# Patient Record
Sex: FEMALE | Race: WHITE | NOT HISPANIC OR LATINO | Employment: FULL TIME | URBAN - METROPOLITAN AREA
[De-identification: names, ages, dates, MRNs, and addresses within clinical notes are randomized per-mention and may not be internally consistent; named-entity substitution may affect disease eponyms.]

---

## 2017-02-03 ENCOUNTER — TELEPHONE (OUTPATIENT)
Dept: OBSTETRICS AND GYNECOLOGY | Facility: CLINIC | Age: 22
End: 2017-02-03

## 2017-02-13 ENCOUNTER — POSTPARTUM VISIT (OUTPATIENT)
Dept: OBSTETRICS AND GYNECOLOGY | Facility: CLINIC | Age: 22
End: 2017-02-13

## 2017-02-13 VITALS
HEIGHT: 62 IN | SYSTOLIC BLOOD PRESSURE: 110 MMHG | WEIGHT: 140 LBS | BODY MASS INDEX: 25.76 KG/M2 | DIASTOLIC BLOOD PRESSURE: 70 MMHG

## 2017-02-13 DIAGNOSIS — Z30.09 ENCOUNTER FOR OTHER GENERAL COUNSELING OR ADVICE ON CONTRACEPTION: ICD-10-CM

## 2017-02-13 PROCEDURE — 99213 OFFICE O/P EST LOW 20 MIN: CPT | Performed by: OBSTETRICS & GYNECOLOGY

## 2017-02-13 NOTE — PROGRESS NOTES
Subjective   Fe Smith is a 21 y.o. female here for postpartum exam    History of Present Illness    21-year-old white female  1 para 1 status post spontaneous vaginal delivery of a viable male infant 7 lbs. 3 oz. with Apgars of 9 and 9.  He is doing well.  She is bottle feeding.  She started her menstrual cycle yesterday.  She desires Nexplanon for contraception.    The following portions of the patient's history were reviewed and updated as appropriate: allergies, current medications, past family history, past medical history, past social history, past surgical history and problem list.    Review of Systems   Gastrointestinal: Negative for abdominal distention and abdominal pain.   Genitourinary: Positive for vaginal bleeding. Negative for difficulty urinating and pelvic pain.       Objective   Physical Exam   Constitutional: She is oriented to person, place, and time. She appears well-developed and well-nourished. No distress.   HENT:   Head: Normocephalic.   Neck: Neck supple. No thyromegaly present.   Cardiovascular: Normal rate, regular rhythm and normal heart sounds.  Exam reveals no gallop.    No murmur heard.  Pulmonary/Chest: Effort normal and breath sounds normal.   Abdominal: Soft. Bowel sounds are normal. She exhibits no distension. There is no tenderness.   Genitourinary:   Genitourinary Comments: The vulva and perineum are without lesion.  The midline episiotomy is well-healed.  Vagina has a small amount of menstrual blood in the vault.  The cervix is without lesion or tenderness to motion.  Uterus is anteverted and normal size and shape.  The adnexa are without mass or tenderness.  The rectovaginal septum is intact.   Musculoskeletal: Normal range of motion. She exhibits no edema or tenderness.   Neurological: She is alert and oriented to person, place, and time. She has normal reflexes.   Skin: Skin is warm and dry. She is not diaphoretic.   Psychiatric: She has a normal mood and affect.  Her behavior is normal.       Assessment/Plan   Fe was seen today for postpartum follow-up.    Diagnoses and all orders for this visit:    Postpartum state    Encounter for other general counseling or advice on contraception     We discussed contraceptive options and the patient desires the progesterone implant.  We will order a Nexplanon and she will return to the office later this week for insertion.  An information booklet was given.

## 2017-02-17 ENCOUNTER — OFFICE VISIT (OUTPATIENT)
Dept: OBSTETRICS AND GYNECOLOGY | Facility: CLINIC | Age: 22
End: 2017-02-17

## 2017-02-17 VITALS
BODY MASS INDEX: 25.76 KG/M2 | DIASTOLIC BLOOD PRESSURE: 70 MMHG | HEIGHT: 62 IN | WEIGHT: 140 LBS | SYSTOLIC BLOOD PRESSURE: 110 MMHG

## 2017-02-17 DIAGNOSIS — Z30.017 ENCOUNTER FOR INITIAL PRESCRIPTION OF IMPLANTABLE SUBDERMAL CONTRACEPTIVE: Primary | ICD-10-CM

## 2017-02-17 PROCEDURE — 11981 INSERTION DRUG DLVR IMPLANT: CPT | Performed by: OBSTETRICS & GYNECOLOGY

## 2017-02-17 NOTE — PROGRESS NOTES
Subjective   Fe Smith is a 21 y.o. female here for Nexplanon insertion    History of Present Illness    21-year-old white female  1 para 1 presents for Nexplanon insertion.  She is currently on her menstrual cycle.  She has read the information booklet and has given both oral and written consent to insertion of the progesterone insert.  She has no complaints.    The following portions of the patient's history were reviewed and updated as appropriate: allergies, current medications, past family history, past medical history, past social history, past surgical history and problem list.    Review of Systems   Genitourinary: Positive for vaginal bleeding. Negative for menstrual problem.       Objective   Physical Exam   The left upper arm was prepped with alcohol.  1% Xylocaine local anesthetic that was administered to the inner portion of the left upper arm.  The Nexplanon implant was then inserted without difficulty.  Hemostasis was achieved with direct pressure.  The needle site was bandaged with a Band-Aid.  The patient tolerated the insertion well.    Assessment/Plan   Fe was seen today for follow-up.    Diagnoses and all orders for this visit:    Encounter for initial prescription of implantable subdermal contraceptive  -     Etonogestrel (NEXPLANON) 68 MG subdermal implant; Inject  into the skin 1 (One) Time.       Instructions were given regarding activities today.  She will call with any problems.

## 2018-05-14 ENCOUNTER — OFFICE VISIT (OUTPATIENT)
Dept: OBSTETRICS AND GYNECOLOGY | Facility: CLINIC | Age: 23
End: 2018-05-14

## 2018-05-14 VITALS
WEIGHT: 113 LBS | BODY MASS INDEX: 20.8 KG/M2 | HEIGHT: 62 IN | SYSTOLIC BLOOD PRESSURE: 100 MMHG | DIASTOLIC BLOOD PRESSURE: 60 MMHG

## 2018-05-14 DIAGNOSIS — Z30.46 ENCOUNTER FOR SURVEILLANCE OF IMPLANTABLE SUBDERMAL CONTRACEPTIVE: ICD-10-CM

## 2018-05-14 DIAGNOSIS — N92.1 BREAKTHROUGH BLEEDING ON NEXPLANON: ICD-10-CM

## 2018-05-14 DIAGNOSIS — Z01.419 ENCOUNTER FOR GYNECOLOGICAL EXAMINATION WITHOUT ABNORMAL FINDING: Primary | ICD-10-CM

## 2018-05-14 DIAGNOSIS — Z97.5 BREAKTHROUGH BLEEDING ON NEXPLANON: ICD-10-CM

## 2018-05-14 PROCEDURE — 99395 PREV VISIT EST AGE 18-39: CPT | Performed by: OBSTETRICS & GYNECOLOGY

## 2018-05-14 NOTE — PROGRESS NOTES
Joey Smith is a 23 y.o. female for annual gyn exam    History of Present Illness   23-year-old white female  1 para 1 presents for routine gynecologic exam.  She has been using the Nexplanon for contraception for the last year.  She has had issues with irregular bleeding.  She is otherwise doing well.  There is no family history of breast cancer.  She is considering alternative forms of contraception.        The following portions of the patient's history were reviewed and updated as appropriate: allergies, current medications, past family history, past medical history, past social history, past surgical history and problem list.      Review of Systems   Constitutional: Negative for activity change, appetite change, fatigue and unexpected weight change.   HENT: Negative.    Eyes: Negative.    Respiratory: Negative.    Cardiovascular: Negative.    Gastrointestinal: Negative for abdominal distention, abdominal pain, anal bleeding, constipation, diarrhea, nausea and vomiting.   Endocrine: Negative for cold intolerance, heat intolerance, polydipsia, polyphagia and polyuria.   Genitourinary: Positive for menstrual problem. Negative for difficulty urinating, dysuria, flank pain, frequency, hematuria, pelvic pain, urgency, vaginal bleeding and vaginal discharge.   Musculoskeletal: Negative.    Skin: Negative.    Allergic/Immunologic: Negative.    Neurological: Negative.    Hematological: Negative.    Psychiatric/Behavioral: Negative.            Physical Exam   Constitutional: She is oriented to person, place, and time. Vital signs are normal. She appears well-developed and well-nourished. She is cooperative.   HENT:   Head: Normocephalic.   Eyes: Conjunctivae are normal. Pupils are equal, round, and reactive to light.   Neck: Normal range of motion. Neck supple. No tracheal deviation present. No thyromegaly present.   Cardiovascular: Normal rate, regular rhythm and normal heart sounds.  Exam reveals  no gallop and no friction rub.    No murmur heard.  Pulmonary/Chest: Effort normal and breath sounds normal. No respiratory distress. She has no wheezes. Right breast exhibits no inverted nipple, no mass, no nipple discharge, no skin change and no tenderness. Left breast exhibits no inverted nipple, no mass, no nipple discharge, no skin change and no tenderness. Breasts are symmetrical. There is no breast swelling.   Abdominal: Soft. Bowel sounds are normal. She exhibits no distension, no ascites and no mass. There is no hepatosplenomegaly. There is no tenderness. There is no rebound, no guarding and no CVA tenderness. No hernia. Hernia confirmed negative in the right inguinal area and confirmed negative in the left inguinal area.   Genitourinary: Rectal exam shows no external hemorrhoid. Pelvic exam was performed with patient supine. No labial fusion. There is no rash, tenderness, lesion or injury on the right labia. There is no rash, tenderness, lesion or injury on the left labia. Uterus is not deviated, not enlarged, not fixed and not tender. Cervix exhibits no motion tenderness, no discharge and no friability. Right adnexum displays no mass, no tenderness and no fullness. Left adnexum displays no mass, no tenderness and no fullness. No erythema, tenderness or bleeding in the vagina. No foreign body in the vagina. No signs of injury around the vagina. No vaginal discharge found.   Genitourinary Comments: The uterus is anteverted and mobile and normal size and shape.   Musculoskeletal: Normal range of motion. She exhibits no edema or deformity.   Lymphadenopathy:     She has no cervical adenopathy.        Right: No inguinal adenopathy present.        Left: No inguinal adenopathy present.   Neurological: She is alert and oriented to person, place, and time. She has normal reflexes. No cranial nerve deficit. Coordination normal.   Skin: Skin is warm and dry. No rash noted. No erythema.   The implant is palpable and  intact in the left upper arm on the medial aspect.   Psychiatric: She has a normal mood and affect. Her behavior is normal.         Fe was seen today for gynecologic exam.    Diagnoses and all orders for this visit:    Encounter for gynecological examination without abnormal finding  -     Pap IG, Ct-Ng, Rfx HPV ASCU    Encounter for surveillance of implantable subdermal contraceptive    Breakthrough bleeding on Nexplanon     we discussed her bleeding issues.  Her pelvic exam is reassuring.  We discussed alternative options including the IUD.  The patient would like to observe her bleeding for a while longer prior to making a change.  I encouraged a healthy diet and regular exercise.  Annual exams were recommended.

## 2018-05-18 LAB
C TRACH RRNA CVX QL NAA+PROBE: NEGATIVE
CONV .: ABNORMAL
CYTOLOGIST CVX/VAG CYTO: ABNORMAL
CYTOLOGY CVX/VAG DOC THIN PREP: ABNORMAL
DX ICD CODE: ABNORMAL
DX ICD CODE: ABNORMAL
HIV 1 & 2 AB SER-IMP: ABNORMAL
HPV I/H RISK 1 DNA CVX QL PROBE+SIG AMP: NEGATIVE
N GONORRHOEA RRNA CVX QL NAA+PROBE: NEGATIVE
OTHER STN SPEC: ABNORMAL
PATH REPORT.FINAL DX SPEC: ABNORMAL
PATHOLOGIST CVX/VAG CYTO: ABNORMAL
RECOM F/U CVX/VAG CYTO: ABNORMAL
STAT OF ADQ CVX/VAG CYTO-IMP: ABNORMAL

## 2018-06-11 ENCOUNTER — OFFICE VISIT (OUTPATIENT)
Dept: OBSTETRICS AND GYNECOLOGY | Facility: CLINIC | Age: 23
End: 2018-06-11

## 2018-06-11 VITALS — HEIGHT: 62 IN | BODY MASS INDEX: 20.8 KG/M2 | WEIGHT: 113 LBS

## 2018-06-11 DIAGNOSIS — Z30.46 ENCOUNTER FOR SURVEILLANCE OF IMPLANTABLE SUBDERMAL CONTRACEPTIVE: Primary | ICD-10-CM

## 2018-06-11 PROCEDURE — 11982 REMOVE DRUG IMPLANT DEVICE: CPT | Performed by: OBSTETRICS & GYNECOLOGY

## 2018-06-11 NOTE — PROGRESS NOTES
Subjective   Fe Smith is a 23 y.o. female for Nexplanon removal     History of Present Illness    23-year-old white female  1 para 1 presents for Nexplanon removal.  She has had issues with irregular, heavy vaginal bleeding with the Nexplanon.  She was seen for her annual exam last month and has decided to change to a Mirena IUD.  She has read the information booklet.    The following portions of the patient's history were reviewed and updated as appropriate: allergies, current medications, past family history, past medical history, past social history, past surgical history and problem list.    Review of Systems   Cardiovascular: Negative for leg swelling.   Gastrointestinal: Negative for abdominal pain.   Genitourinary: Positive for menstrual problem, pelvic pain and vaginal bleeding.       Objective   Physical Exam   In the supine position, the left upper arm was isolated and the implant identified.  The skin was cleaned with alcohol and 1% Xylocaine local anesthetic was injected at the tip of the implant.  A 7 mm incision was then made and the implant removed in its entirety without difficulty.  The implant was intact.  The incision was then reapproximated with a Steri-Strip.  Hemostasis was complete.  The wound was bandaged with a Band-Aid.  The patient tolerated the removal well.  She will follow-up post-removal instructions which were explicit.    Assessment/Plan   Fe was seen today for gynecologic exam.    Diagnoses and all orders for this visit:    Encounter for surveillance of implantable subdermal contraceptive     The patient will schedule IUD placement at her convenience.  She will use barrier contraception in the meantime.

## 2019-09-11 ENCOUNTER — INITIAL PRENATAL (OUTPATIENT)
Dept: OBSTETRICS AND GYNECOLOGY | Facility: CLINIC | Age: 24
End: 2019-09-11

## 2019-09-11 ENCOUNTER — PROCEDURE VISIT (OUTPATIENT)
Dept: OBSTETRICS AND GYNECOLOGY | Facility: CLINIC | Age: 24
End: 2019-09-11

## 2019-09-11 VITALS — SYSTOLIC BLOOD PRESSURE: 112 MMHG | DIASTOLIC BLOOD PRESSURE: 59 MMHG | WEIGHT: 121 LBS | BODY MASS INDEX: 22.13 KG/M2

## 2019-09-11 DIAGNOSIS — O21.9 NAUSEA AND VOMITING IN PREGNANCY: ICD-10-CM

## 2019-09-11 DIAGNOSIS — Z34.81 PRENATAL CARE, SUBSEQUENT PREGNANCY IN FIRST TRIMESTER: Primary | ICD-10-CM

## 2019-09-11 DIAGNOSIS — O36.80X0 ENCOUNTER TO DETERMINE FETAL VIABILITY OF PREGNANCY, SINGLE OR UNSPECIFIED FETUS: Primary | ICD-10-CM

## 2019-09-11 DIAGNOSIS — K59.09 OTHER CONSTIPATION: ICD-10-CM

## 2019-09-11 PROCEDURE — 99214 OFFICE O/P EST MOD 30 MIN: CPT | Performed by: OBSTETRICS & GYNECOLOGY

## 2019-09-11 PROCEDURE — 76817 TRANSVAGINAL US OBSTETRIC: CPT | Performed by: OBSTETRICS & GYNECOLOGY

## 2019-09-11 RX ORDER — PRENATAL VIT NO.126/IRON/FOLIC 28MG-0.8MG
1 TABLET ORAL DAILY
Qty: 30 TABLET | Refills: 11 | Status: SHIPPED | OUTPATIENT
Start: 2019-09-11 | End: 2020-05-22

## 2019-09-11 RX ORDER — PROMETHAZINE HYDROCHLORIDE 12.5 MG/1
12.5 TABLET ORAL EVERY 6 HOURS PRN
Qty: 30 TABLET | Refills: 0 | Status: SHIPPED | OUTPATIENT
Start: 2019-09-11 | End: 2019-10-13 | Stop reason: SDUPTHER

## 2019-09-11 RX ORDER — DOCUSATE SODIUM 100 MG/1
100 CAPSULE, LIQUID FILLED ORAL 2 TIMES DAILY
Qty: 60 CAPSULE | Refills: 5 | Status: ON HOLD | OUTPATIENT
Start: 2019-09-11 | End: 2020-03-01

## 2019-09-11 NOTE — PROGRESS NOTES
IOB   24-year-old white female  2 para 1 S menstrual period July 15 presents for initial obstetric evaluation.  Her menstrual cycles had been fairly regular and she was not using any form of contraception.  She has no chronic medical problems except migraine headaches.  Her first pregnancy was complicated by polyhydramnios and she underwent a uncomplicated vaginal delivery at term.  There is no family history of genetic disorders.  She has yet to begin prenatal vitamins and has stopped her migraine headache medication.  ROS: + Constipation and nausea.  All other systems were reviewed and were negative.  S=D=U/S  A: 8 week viable IUP  Constipation  Nausea  History migraine headaches  P: We will obtain routine prenatal lab testing.  We will start prenatal vitamins as well as Colace and Phenergan.  We discussed pregnancy in detail including diet, exercise, medications, travel, immunizations, and optional testing.  The patient and family are interested in genetic testing when available.

## 2019-09-12 LAB
ABO GROUP BLD: (no result)
BASOPHILS # BLD AUTO: 0 X10E3/UL (ref 0–0.2)
BASOPHILS NFR BLD AUTO: 0 %
BLD GP AB SCN SERPL QL: NEGATIVE
EOSINOPHIL # BLD AUTO: 0.1 X10E3/UL (ref 0–0.4)
EOSINOPHIL NFR BLD AUTO: 1 %
ERYTHROCYTE [DISTWIDTH] IN BLOOD BY AUTOMATED COUNT: 13.1 % (ref 12.3–15.4)
HBV SURFACE AG SERPL QL IA: NEGATIVE
HCT VFR BLD AUTO: 33.9 % (ref 34–46.6)
HCV AB S/CO SERPL IA: <0.1 S/CO RATIO (ref 0–0.9)
HGB BLD-MCNC: 11.6 G/DL (ref 11.1–15.9)
HIV 1+2 AB+HIV1 P24 AG SERPL QL IA: NON REACTIVE
IMM GRANULOCYTES # BLD AUTO: 0 X10E3/UL (ref 0–0.1)
IMM GRANULOCYTES NFR BLD AUTO: 0 %
LYMPHOCYTES # BLD AUTO: 1.1 X10E3/UL (ref 0.7–3.1)
LYMPHOCYTES NFR BLD AUTO: 16 %
MCH RBC QN AUTO: 30.9 PG (ref 26.6–33)
MCHC RBC AUTO-ENTMCNC: 34.2 G/DL (ref 31.5–35.7)
MCV RBC AUTO: 90 FL (ref 79–97)
MONOCYTES # BLD AUTO: 0.4 X10E3/UL (ref 0.1–0.9)
MONOCYTES NFR BLD AUTO: 5 %
NEUTROPHILS # BLD AUTO: 5.4 X10E3/UL (ref 1.4–7)
NEUTROPHILS NFR BLD AUTO: 78 %
PLATELET # BLD AUTO: 215 X10E3/UL (ref 150–450)
RBC # BLD AUTO: 3.76 X10E6/UL (ref 3.77–5.28)
RH BLD: POSITIVE
RPR SER QL: NON REACTIVE
RUBV IGG SERPL IA-ACNC: 1.68 INDEX
TSH SERPL DL<=0.005 MIU/L-ACNC: 2.2 UIU/ML (ref 0.27–4.2)
WBC # BLD AUTO: 7 X10E3/UL (ref 3.4–10.8)

## 2019-09-14 LAB
A VAGINAE DNA VAG QL NAA+PROBE: NORMAL SCORE
BACTERIA UR CULT: ABNORMAL
BACTERIA UR CULT: ABNORMAL
BVAB2 DNA VAG QL NAA+PROBE: NORMAL SCORE
C ALBICANS DNA VAG QL NAA+PROBE: NEGATIVE
C GLABRATA DNA VAG QL NAA+PROBE: NEGATIVE
C TRACH RRNA SPEC QL NAA+PROBE: NEGATIVE
MEGA1 DNA VAG QL NAA+PROBE: NORMAL SCORE
N GONORRHOEA RRNA SPEC QL NAA+PROBE: NEGATIVE
OTHER ANTIBIOTIC SUSC ISLT: ABNORMAL
T VAGINALIS RRNA SPEC QL NAA+PROBE: NEGATIVE

## 2019-09-16 RX ORDER — NITROFURANTOIN 25; 75 MG/1; MG/1
100 CAPSULE ORAL 2 TIMES DAILY
Qty: 14 CAPSULE | Refills: 0 | Status: SHIPPED | OUTPATIENT
Start: 2019-09-16 | End: 2019-09-23

## 2019-09-23 ENCOUNTER — APPOINTMENT (OUTPATIENT)
Dept: ULTRASOUND IMAGING | Facility: HOSPITAL | Age: 24
End: 2019-09-23

## 2019-09-23 ENCOUNTER — HOSPITAL ENCOUNTER (EMERGENCY)
Facility: HOSPITAL | Age: 24
Discharge: HOME | End: 2019-09-23
Attending: EMERGENCY MEDICINE | Admitting: EMERGENCY MEDICINE

## 2019-09-23 VITALS
BODY MASS INDEX: 23.95 KG/M2 | DIASTOLIC BLOOD PRESSURE: 79 MMHG | HEART RATE: 96 BPM | RESPIRATION RATE: 16 BRPM | HEIGHT: 60 IN | TEMPERATURE: 99.2 F | WEIGHT: 122 LBS | OXYGEN SATURATION: 100 % | SYSTOLIC BLOOD PRESSURE: 132 MMHG

## 2019-09-23 DIAGNOSIS — O20.9 VAGINAL BLEEDING IN PREGNANCY, FIRST TRIMESTER: Primary | ICD-10-CM

## 2019-09-23 LAB
ABO GROUP BLD: NORMAL
BACTERIA UR QL AUTO: NORMAL /HPF
BASOPHILS # BLD AUTO: 0.03 10*3/MM3 (ref 0–0.2)
BASOPHILS NFR BLD AUTO: 0.4 % (ref 0–1.5)
BILIRUB UR QL STRIP: NEGATIVE
CLARITY UR: CLEAR
COLOR UR: YELLOW
DEPRECATED RDW RBC AUTO: 41.1 FL (ref 37–54)
EOSINOPHIL # BLD AUTO: 0.15 10*3/MM3 (ref 0–0.4)
EOSINOPHIL NFR BLD AUTO: 2 % (ref 0.3–6.2)
ERYTHROCYTE [DISTWIDTH] IN BLOOD BY AUTOMATED COUNT: 12.4 % (ref 12.3–15.4)
GLUCOSE UR STRIP-MCNC: NEGATIVE MG/DL
HCG INTACT+B SERPL-ACNC: NORMAL MIU/ML
HCT VFR BLD AUTO: 32.4 % (ref 34–46.6)
HGB BLD-MCNC: 10.7 G/DL (ref 12–15.9)
HGB UR QL STRIP.AUTO: NEGATIVE
HOLD SPECIMEN: NORMAL
HOLD SPECIMEN: NORMAL
HYALINE CASTS UR QL AUTO: NORMAL /LPF
IMM GRANULOCYTES # BLD AUTO: 0.02 10*3/MM3 (ref 0–0.05)
IMM GRANULOCYTES NFR BLD AUTO: 0.3 % (ref 0–0.5)
KETONES UR QL STRIP: NEGATIVE
LEUKOCYTE ESTERASE UR QL STRIP.AUTO: ABNORMAL
LYMPHOCYTES # BLD AUTO: 1.32 10*3/MM3 (ref 0.7–3.1)
LYMPHOCYTES NFR BLD AUTO: 18 % (ref 19.6–45.3)
MCH RBC QN AUTO: 29.9 PG (ref 26.6–33)
MCHC RBC AUTO-ENTMCNC: 33 G/DL (ref 31.5–35.7)
MCV RBC AUTO: 90.5 FL (ref 79–97)
MONOCYTES # BLD AUTO: 0.45 10*3/MM3 (ref 0.1–0.9)
MONOCYTES NFR BLD AUTO: 6.1 % (ref 5–12)
NEUTROPHILS # BLD AUTO: 5.36 10*3/MM3 (ref 1.7–7)
NEUTROPHILS NFR BLD AUTO: 73.2 % (ref 42.7–76)
NITRITE UR QL STRIP: NEGATIVE
NRBC BLD AUTO-RTO: 0 /100 WBC (ref 0–0.2)
PH UR STRIP.AUTO: 7 [PH] (ref 5–8)
PLATELET # BLD AUTO: 189 10*3/MM3 (ref 140–450)
PMV BLD AUTO: 10.2 FL (ref 6–12)
PROT UR QL STRIP: NEGATIVE
RBC # BLD AUTO: 3.58 10*6/MM3 (ref 3.77–5.28)
RBC # UR: NORMAL /HPF
REF LAB TEST METHOD: NORMAL
RH BLD: POSITIVE
SP GR UR STRIP: 1.01 (ref 1–1.03)
SQUAMOUS #/AREA URNS HPF: NORMAL /HPF
UROBILINOGEN UR QL STRIP: ABNORMAL
WBC NRBC COR # BLD: 7.33 10*3/MM3 (ref 3.4–10.8)
WBC UR QL AUTO: NORMAL /HPF
WHOLE BLOOD HOLD SPECIMEN: NORMAL
WHOLE BLOOD HOLD SPECIMEN: NORMAL

## 2019-09-23 PROCEDURE — 76815 OB US LIMITED FETUS(S): CPT

## 2019-09-23 PROCEDURE — 36415 COLL VENOUS BLD VENIPUNCTURE: CPT

## 2019-09-23 PROCEDURE — 99283 EMERGENCY DEPT VISIT LOW MDM: CPT

## 2019-09-23 PROCEDURE — 81001 URINALYSIS AUTO W/SCOPE: CPT | Performed by: NURSE PRACTITIONER

## 2019-09-23 PROCEDURE — 86901 BLOOD TYPING SEROLOGIC RH(D): CPT

## 2019-09-23 PROCEDURE — 76817 TRANSVAGINAL US OBSTETRIC: CPT

## 2019-09-23 PROCEDURE — 86900 BLOOD TYPING SEROLOGIC ABO: CPT

## 2019-09-23 PROCEDURE — 84702 CHORIONIC GONADOTROPIN TEST: CPT

## 2019-09-23 PROCEDURE — 85025 COMPLETE CBC W/AUTO DIFF WBC: CPT

## 2019-09-23 RX ORDER — SODIUM CHLORIDE 0.9 % (FLUSH) 0.9 %
10 SYRINGE (ML) INJECTION AS NEEDED
Status: DISCONTINUED | OUTPATIENT
Start: 2019-09-23 | End: 2019-09-23 | Stop reason: HOSPADM

## 2019-09-23 NOTE — ED PROVIDER NOTES
Pt presents to the ED c/o vaginal bleeding that she noticed earlier this morning while she was wiping. Pt is approximately 9 weeks pregnant. . Pt also c/o abd pain. Pt states she was recently dx with a UTI and has been taking antibiotics for it. There are no other complaints at this time.     On exam:   General: No acute distress.  ABD: Soft, nontender.         MD ATTESTATION NOTE    The WINNIE and I have discussed this patient's history, physical exam, and treatment plan.  I have reviewed the documentation and personally had a face to face interaction with the patient. I affirm the documentation and agree with the treatment and plan.  The attached note describes my personal findings.      Documentation assistance provided by stefanie Harvey for Dr. Jonatan MD. Information recorded by the scribe was done at my direction and has been verified and validated by me.         Yumiko Harvey  [id number]       Wil Cheung II, MD  [id number]

## 2019-09-23 NOTE — ED TRIAGE NOTES
Pt states that she is 9 weeks pregnant and had some vaginal bleeding while wiping this morning.   States that she has some abdominal pain with the bleeding.   Pt has been pregnant before and carried the first one to term.

## 2019-09-23 NOTE — DISCHARGE INSTRUCTIONS
THIS LIST WILL HELP YOU TO KNOW SOME OVER-THE-COUNTER MEDICATIONS YOU CAN TAKE DURING PREGNANCY    (Lista de medicinas que puede bianca bita el embarazo)    COLD (Cararro):       PAINS AND ACHES (Lashaun):  Robitussin      Tylenol   Actifed       Tylenol extra strength  Tylenol Cold          DIARRHEA (diarrea):    HEADACHES (dolor de vinayak):  Fibercon      Tylenol extra strength  Clear liquids or jello     HEARTBURN/INDIGESTION:   SWELLING (Inchazon):  Maalox, Mylanta, Tums, Zantac   No salt         (inchazon) no fast food or junk food         No sodas         No canned soups    TO SLEEP (Para dormir):    YEAST INFECTION:  Tylenol PM      Monistat 7 or Monistat 3 (generic ok)  Benadryl 25 mg      CONSTIPATION:  Metamucil  Eat very little rice (coma poco arroz)  Add in Prune or Prune juice (Ciruelas, jugo de cireuelas)  8 glasses of water daily (8 vasos de agua al durga)  Colace (stool softners) generic ok    HEMORRHOIDS (Hemorroides):   SEASONAL ALLERGIES:  Preparation H, Anusol     Benadryl, Zyrtec, Claritin        Nothing in vagina until cleared by Dr. Kapoor (penis, sex toys, tampons, douches)    Return Precautions    Although you are being discharged from the ED today, I encourage you to return for worsening symptoms.  Things can, and do, change such that treatment at home with medication may not be adequate.      Specifically, return for any of the following:    Chest pain, shortness of breath, pain or nausea and vomiting not controlled by medications provided.    Please make a follow up with your Primary Care Provider for a blood pressure recheck.

## 2019-09-23 NOTE — ED PROVIDER NOTES
EMERGENCY DEPARTMENT ENCOUNTER    Room Number:    Date seen:  2019  Time seen: 3:56 PM  PCP: Mekhi Kapoor MD    HPI:  Chief complaint: vaginal bleeding  Context:Fe Smith is a 24 y.o. female who is 9 weeks pregnant () and presents to the ED with c/o vaginal bleeding that pt first noticed earlier this morning while she was wiping. Pt also c/o lower abd pain with her bleeding. Pt states that she started abx for UTI two days ago and has been taking the medication as directed as prescribed by  Dr. Kapoor but she cannot remember the name. Pt's son at bedside.     MEDICAL RECORD REVIEW    ALLERGIES  Patient has no known allergies.    PAST MEDICAL HISTORY  Active Ambulatory Problems     Diagnosis Date Noted   • Prenatal care, subsequent pregnancy in first trimester 2019   • Other constipation 2019   • Nausea and vomiting in pregnancy 2019     Resolved Ambulatory Problems     Diagnosis Date Noted   • Pregnancy 2016   • Rubella non-immune status, antepartum 10/05/2016   • Back pain affecting pregnancy in third trimester 2016   • Encounter for supervision of normal first pregnancy in third trimester 2016     Past Medical History:   Diagnosis Date   • Abnormal Pap smear of cervix    • Chlamydia        PAST SURGICAL HISTORY  Past Surgical History:   Procedure Laterality Date   • WISDOM TOOTH EXTRACTION         FAMILY HISTORY  History reviewed. No pertinent family history.    SOCIAL HISTORY  Social History     Socioeconomic History   • Marital status: Single     Spouse name: Not on file   • Number of children: Not on file   • Years of education: Not on file   • Highest education level: Not on file   Tobacco Use   • Smoking status: Never Smoker   Substance and Sexual Activity   • Alcohol use: No   • Drug use: No   • Sexual activity: Yes       REVIEW OF SYSTEMS  Review of Systems   Constitutional: Negative for activity change, appetite change, diaphoresis and fever.    HENT: Negative for trouble swallowing.    Eyes: Negative for visual disturbance.   Respiratory: Negative for cough, chest tightness, shortness of breath and wheezing.    Cardiovascular: Negative for chest pain, palpitations and leg swelling.   Gastrointestinal: Positive for abdominal pain (with her vaginal bleeding). Negative for diarrhea, nausea and vomiting.   Genitourinary: Positive for vaginal bleeding (this morning). Negative for dysuria.   Musculoskeletal: Negative for back pain.   Skin: Negative for rash.   Neurological: Negative for dizziness, speech difficulty and light-headedness.       PHYSICAL EXAM  ED Triage Vitals   Temp Heart Rate Resp BP SpO2   09/23/19 1146 09/23/19 1146 09/23/19 1146 09/23/19 1241 09/23/19 1146   99.2 °F (37.3 °C) 100 16 140/77 100 %      Temp src Heart Rate Source Patient Position BP Location FiO2 (%)   -- -- -- -- --            Physical Exam   Constitutional: She is oriented to person, place, and time and well-developed, well-nourished, and in no distress. She does not have a sickly appearance. No distress.   HENT:   Head: Normocephalic and atraumatic.   Mouth/Throat: Uvula is midline, oropharynx is clear and moist and mucous membranes are normal.   Eyes: EOM are normal. Pupils are equal, round, and reactive to light.   Neck: Normal range of motion. Neck supple.   Cardiovascular: Normal rate, regular rhythm, S1 normal, S2 normal and normal heart sounds. Exam reveals no gallop and no friction rub.   No murmur heard.  Pulmonary/Chest: Effort normal and breath sounds normal. She has no decreased breath sounds. She has no wheezes. She has no rhonchi. She has no rales.   Abdominal: Soft. Normal appearance and bowel sounds are normal. There is no tenderness. There is no rebound and no guarding.   Rounded abdomen.   Genitourinary:   Genitourinary Comments: Pt deferred pelvic exam.    Musculoskeletal: Normal range of motion.   Neurological: She is alert and oriented to person, place,  and time. GCS score is 15.   Skin: Skin is warm, dry and intact.   Psychiatric: Affect and judgment normal.   Nursing note and vitals reviewed.      LAB RESULTS  Recent Results (from the past 24 hour(s))   Lavender Top    Collection Time: 09/23/19 12:44 PM   Result Value Ref Range    Extra Tube hold for add-on    CBC Auto Differential    Collection Time: 09/23/19 12:44 PM   Result Value Ref Range    WBC 7.33 3.40 - 10.80 10*3/mm3    RBC 3.58 (L) 3.77 - 5.28 10*6/mm3    Hemoglobin 10.7 (L) 12.0 - 15.9 g/dL    Hematocrit 32.4 (L) 34.0 - 46.6 %    MCV 90.5 79.0 - 97.0 fL    MCH 29.9 26.6 - 33.0 pg    MCHC 33.0 31.5 - 35.7 g/dL    RDW 12.4 12.3 - 15.4 %    RDW-SD 41.1 37.0 - 54.0 fl    MPV 10.2 6.0 - 12.0 fL    Platelets 189 140 - 450 10*3/mm3    Neutrophil % 73.2 42.7 - 76.0 %    Lymphocyte % 18.0 (L) 19.6 - 45.3 %    Monocyte % 6.1 5.0 - 12.0 %    Eosinophil % 2.0 0.3 - 6.2 %    Basophil % 0.4 0.0 - 1.5 %    Immature Grans % 0.3 0.0 - 0.5 %    Neutrophils, Absolute 5.36 1.70 - 7.00 10*3/mm3    Lymphocytes, Absolute 1.32 0.70 - 3.10 10*3/mm3    Monocytes, Absolute 0.45 0.10 - 0.90 10*3/mm3    Eosinophils, Absolute 0.15 0.00 - 0.40 10*3/mm3    Basophils, Absolute 0.03 0.00 - 0.20 10*3/mm3    Immature Grans, Absolute 0.02 0.00 - 0.05 10*3/mm3    nRBC 0.0 0.0 - 0.2 /100 WBC   hCG, Quantitative, Pregnancy    Collection Time: 09/23/19 12:45 PM   Result Value Ref Range    HCG Quantitative 124,955.00 mIU/mL   ABO / Rh    Collection Time: 09/23/19 12:45 PM   Result Value Ref Range    ABO Type A     RH type Positive    Light Blue Top    Collection Time: 09/23/19 12:45 PM   Result Value Ref Range    Extra Tube hold for add-on    Green Top (Gel)    Collection Time: 09/23/19 12:45 PM   Result Value Ref Range    Extra Tube Hold for add-ons.    Gold Top - SST    Collection Time: 09/23/19 12:45 PM   Result Value Ref Range    Extra Tube Hold for add-ons.    Urinalysis With Microscopic If Indicated (No Culture) - Urine, Clean Catch     Collection Time: 09/23/19  4:01 PM   Result Value Ref Range    Color, UA Yellow Yellow, Straw    Appearance, UA Clear Clear    pH, UA 7.0 5.0 - 8.0    Specific Gravity, UA 1.012 1.005 - 1.030    Glucose, UA Negative Negative    Ketones, UA Negative Negative    Bilirubin, UA Negative Negative    Blood, UA Negative Negative    Protein, UA Negative Negative    Leuk Esterase, UA Trace (A) Negative    Nitrite, UA Negative Negative    Urobilinogen, UA 0.2 E.U./dL 0.2 - 1.0 E.U./dL   Urinalysis, Microscopic Only - Urine, Clean Catch    Collection Time: 09/23/19  4:01 PM   Result Value Ref Range    RBC, UA 0-2 None Seen, 0-2 /HPF    WBC, UA 0-2 None Seen, 0-2 /HPF    Bacteria, UA None Seen None Seen /HPF    Squamous Epithelial Cells, UA 0-2 None Seen, 0-2 /HPF    Hyaline Casts, UA 0-2 None Seen /LPF    Methodology Automated Microscopy        I ordered the above labs and reviewed the results    RADIOLOGY  US Ob Transvaginal   Final Result       Live intrauterine pregnancy. No subchorionic hematoma demonstrated.       This report was finalized on 9/23/2019 2:10 PM by Dr. Ryan Jade M.D.          US Ob Limited 1 + Fetuses    (Results Pending)       I ordered the above noted radiological studies and reviewed the images on the PACS system.      MEDICATIONS GIVEN IN ER  Medications   sodium chloride 0.9 % flush 10 mL (not administered)     PROCEDURES  Procedures    COURSE & MEDICAL DECISION MAKING  Pertinent Labs and Imaging studies that were ordered and reviewed are noted above.  Results were reviewed/discussed with the patient and they were also made aware of online access.  Pt also made aware that some labs, such as cultures, will not be resulted during ER visit and follow up with PMD is necessary.     PROGRESS AND CONSULTS    Progress Notes:    ED Course as of Sep 23 1651   Mon Sep 23, 2019   1630 Pt states she is on an antibiotic that was called in by Dr. Kapoor 2 days ago for UTI.  She is to continue this.  I  "have discussed nothing per vagina until released by Dr. Kapoor and to have her urine rechecked 4 days after completion of antibiotic.  Leukocytes, UA: (!) Trace [EP]   1634 No need for Rhogam. RH type: Positive [TD]      ED Course User Index  [EP] Angle Jorgensen, APRN  [TD] Wil Cheung II, MD       1600 Initial Encounter. Notified pt of negative US and unremarkable lab work. I instructed the pt to avoid sexual intercourse until cleared by her OB doctor. I offered to perform a pelvic exam, which the pt declined. I told the pt I am waiting on her urine to come back. All questions addressed.     1631 Reviewed pt's history and workup with Dr. Cheung.  After a bedside evaluation, Dr. Cheung agrees with the plan of care. Dr. Cheung discussed discharge information with the pt.     The patient's history, physical exam, and lab findings were discussed with the physician, who also performed a face to face history and physical exam.  I discussed all results and noted any abnormalities with patient.  Discussed absoute need to recheck abnormalities with their family physician.  I answered any of the patient's questions.  Discussed plan for discharge, as there is no emergent indication for admission.  Pt is agreeable and understands need for follow up and repeat testing.  Pt is aware that discharge does not mean that nothing is wrong but it indicates no emergency is present and they must continue care with their family physician.  Pt is discharged with instructions to follow up with primary care doctor to have their blood pressure rechecked.     Disposition vitals:  /79 (BP Location: Right arm, Patient Position: Lying)   Pulse 96   Temp 99.2 °F (37.3 °C)   Resp 16   Ht 152.4 cm (60\")   Wt 55.3 kg (122 lb)   LMP 07/15/2019   SpO2 100%   BMI 23.83 kg/m²       DIAGNOSIS  Final diagnoses:   Vaginal bleeding in pregnancy, first trimester       FOLLOW UP   Mekhi Kapoor MD  [address] [place] [address]  LAINE " [address] [place] [address]  [phone]    Schedule an appointment as soon as possible for a visit in 1 week        RX     Medication List      No changes were made to your prescriptions during this visit.           Documentation assistance provided by stefanie Nicholas for LISA Washington.  Information recorded by the stefanie was done at my direction and has been verified and validated by me.                 Gaston Nicholas  09/23/19 165       Angle Jorgensen APRN  09/23/19 5944

## 2019-10-09 ENCOUNTER — ROUTINE PRENATAL (OUTPATIENT)
Dept: OBSTETRICS AND GYNECOLOGY | Facility: CLINIC | Age: 24
End: 2019-10-09

## 2019-10-09 VITALS — WEIGHT: 128 LBS | SYSTOLIC BLOOD PRESSURE: 110 MMHG | DIASTOLIC BLOOD PRESSURE: 54 MMHG | BODY MASS INDEX: 25 KG/M2

## 2019-10-09 DIAGNOSIS — O21.9 NAUSEA AND VOMITING IN PREGNANCY: ICD-10-CM

## 2019-10-09 DIAGNOSIS — Z34.82 PRENATAL CARE, SUBSEQUENT PREGNANCY IN SECOND TRIMESTER: Primary | ICD-10-CM

## 2019-10-09 PROCEDURE — 99213 OFFICE O/P EST LOW 20 MIN: CPT | Performed by: OBSTETRICS & GYNECOLOGY

## 2019-10-09 RX ORDER — ONDANSETRON 4 MG/1
4 TABLET, FILM COATED ORAL EVERY 8 HOURS PRN
Qty: 30 TABLET | Refills: 1 | Status: ON HOLD | OUTPATIENT
Start: 2019-10-09 | End: 2020-03-01

## 2019-10-09 NOTE — PROGRESS NOTES
CC: 12w2d IUP  No FM yet  Denies pain or bleeding  ROS: + h/a and N&V  Gained 7#  A: 12 wk IUP  Headaches  P: Try Mg++, Rx Zofran  Desires genetic testing

## 2019-10-13 DIAGNOSIS — Z34.81 PRENATAL CARE, SUBSEQUENT PREGNANCY IN FIRST TRIMESTER: ICD-10-CM

## 2019-10-13 DIAGNOSIS — O21.9 NAUSEA AND VOMITING IN PREGNANCY: ICD-10-CM

## 2019-10-15 LAB
CFDNA.FET/CFDNA.TOTAL SFR FETUS: NORMAL %
CITATION REF LAB TEST: NORMAL
FET CHR 13 TS PLAS.CFDNA QL: NEGATIVE
FET CHR 13+18+21 TS PLAS.CFDNA QL: NORMAL
FET CHR 18 TS PLAS.CFDNA QL: NEGATIVE
FET CHR 21 TS PLAS.CFDNA QL: NEGATIVE
FET Y CHROM PLAS.CFDNA QL: DETECTED
FET Y CHROM PLAS.CFDNA: NORMAL
FETAL FRACTION: NORMAL
GESTATION: NORMAL
GESTATIONAL AGE > 9:: NORMAL
LAB DIRECTOR NAME PROVIDER: NORMAL
LABORATORY COMMENT REPORT: NORMAL
LIMITATIONS OF THE TEST: NORMAL
NOTE: NORMAL
POSITIVE PREDICTIVE VALUE: NORMAL
REF LAB TEST METHOD: NORMAL
SERVICE CMNT 02-IMP: NORMAL
SERVICE CMNT 03-IMP: NORMAL
SERVICE CMNT-IMP: NORMAL
TEST PERFORMANCE INFO SPEC: NORMAL
TEST PERFORMANCE INFO SPEC: NORMAL

## 2019-10-16 RX ORDER — PROMETHAZINE HYDROCHLORIDE 12.5 MG/1
12.5 TABLET ORAL EVERY 6 HOURS PRN
Qty: 30 TABLET | Refills: 0 | Status: SHIPPED | OUTPATIENT
Start: 2019-10-16 | End: 2020-01-03 | Stop reason: SDUPTHER

## 2019-11-06 ENCOUNTER — ROUTINE PRENATAL (OUTPATIENT)
Dept: OBSTETRICS AND GYNECOLOGY | Facility: CLINIC | Age: 24
End: 2019-11-06

## 2019-11-06 VITALS — WEIGHT: 128 LBS | DIASTOLIC BLOOD PRESSURE: 60 MMHG | BODY MASS INDEX: 25 KG/M2 | SYSTOLIC BLOOD PRESSURE: 90 MMHG

## 2019-11-06 DIAGNOSIS — Z34.82 PRENATAL CARE, SUBSEQUENT PREGNANCY IN SECOND TRIMESTER: Primary | ICD-10-CM

## 2019-11-06 DIAGNOSIS — O21.9 NAUSEA AND VOMITING IN PREGNANCY: ICD-10-CM

## 2019-11-06 DIAGNOSIS — G43.009 MIGRAINE WITHOUT AURA AND WITHOUT STATUS MIGRAINOSUS, NOT INTRACTABLE: ICD-10-CM

## 2019-11-06 PROCEDURE — 99214 OFFICE O/P EST MOD 30 MIN: CPT | Performed by: OBSTETRICS & GYNECOLOGY

## 2019-11-06 NOTE — PROGRESS NOTES
CC: 16w2d IUP  + FM  Denies pain or bleeding  47XY  ROS: Headaches persist-has never seen a neurologist.  She did not try magnesium.  She usually takes prescription strength Motrin.  The nausea and vomiting have persisted and are worsened by her headaches.  A: 16 wk IUP  Neurology consult.  P: Return to office 4 weeks with anatomy screen.

## 2019-11-08 ENCOUNTER — APPOINTMENT (OUTPATIENT)
Dept: GENERAL RADIOLOGY | Facility: HOSPITAL | Age: 24
End: 2019-11-08

## 2019-11-08 ENCOUNTER — HOSPITAL ENCOUNTER (EMERGENCY)
Facility: HOSPITAL | Age: 24
Discharge: HOME | End: 2019-11-08
Attending: EMERGENCY MEDICINE | Admitting: EMERGENCY MEDICINE

## 2019-11-08 VITALS
HEART RATE: 100 BPM | BODY MASS INDEX: 25.13 KG/M2 | HEIGHT: 60 IN | WEIGHT: 128 LBS | RESPIRATION RATE: 18 BRPM | DIASTOLIC BLOOD PRESSURE: 52 MMHG | SYSTOLIC BLOOD PRESSURE: 102 MMHG | OXYGEN SATURATION: 99 % | TEMPERATURE: 98 F

## 2019-11-08 DIAGNOSIS — G43.009 MIGRAINE WITHOUT AURA AND WITHOUT STATUS MIGRAINOSUS, NOT INTRACTABLE: Primary | ICD-10-CM

## 2019-11-08 DIAGNOSIS — R07.89 ATYPICAL CHEST PAIN: ICD-10-CM

## 2019-11-08 DIAGNOSIS — Z34.92 SECOND TRIMESTER PREGNANCY: ICD-10-CM

## 2019-11-08 LAB
ALBUMIN SERPL-MCNC: 4.2 G/DL (ref 3.5–5.2)
ALBUMIN/GLOB SERPL: 1.2 G/DL
ALP SERPL-CCNC: 45 U/L (ref 39–117)
ALT SERPL W P-5'-P-CCNC: 8 U/L (ref 1–33)
ANION GAP SERPL CALCULATED.3IONS-SCNC: 14.6 MMOL/L (ref 5–15)
AST SERPL-CCNC: 12 U/L (ref 1–32)
BASOPHILS # BLD AUTO: 0.01 10*3/MM3 (ref 0–0.2)
BASOPHILS NFR BLD AUTO: 0.2 % (ref 0–1.5)
BILIRUB SERPL-MCNC: 0.5 MG/DL (ref 0.2–1.2)
BUN BLD-MCNC: 8 MG/DL (ref 6–20)
BUN/CREAT SERPL: 17.8 (ref 7–25)
CALCIUM SPEC-SCNC: 9.5 MG/DL (ref 8.6–10.5)
CHLORIDE SERPL-SCNC: 104 MMOL/L (ref 98–107)
CO2 SERPL-SCNC: 22.4 MMOL/L (ref 22–29)
CREAT BLD-MCNC: 0.45 MG/DL (ref 0.57–1)
DEPRECATED RDW RBC AUTO: 41.6 FL (ref 37–54)
EOSINOPHIL # BLD AUTO: 0.13 10*3/MM3 (ref 0–0.4)
EOSINOPHIL NFR BLD AUTO: 2 % (ref 0.3–6.2)
ERYTHROCYTE [DISTWIDTH] IN BLOOD BY AUTOMATED COUNT: 12.8 % (ref 12.3–15.4)
GFR SERPL CREATININE-BSD FRML MDRD: >150 ML/MIN/1.73
GLOBULIN UR ELPH-MCNC: 3.4 GM/DL
GLUCOSE BLD-MCNC: 80 MG/DL (ref 65–99)
HCT VFR BLD AUTO: 31.3 % (ref 34–46.6)
HGB BLD-MCNC: 10.8 G/DL (ref 12–15.9)
IMM GRANULOCYTES # BLD AUTO: 0.01 10*3/MM3 (ref 0–0.05)
IMM GRANULOCYTES NFR BLD AUTO: 0.2 % (ref 0–0.5)
LYMPHOCYTES # BLD AUTO: 1.01 10*3/MM3 (ref 0.7–3.1)
LYMPHOCYTES NFR BLD AUTO: 15.7 % (ref 19.6–45.3)
MCH RBC QN AUTO: 31.1 PG (ref 26.6–33)
MCHC RBC AUTO-ENTMCNC: 34.5 G/DL (ref 31.5–35.7)
MCV RBC AUTO: 90.2 FL (ref 79–97)
MONOCYTES # BLD AUTO: 0.35 10*3/MM3 (ref 0.1–0.9)
MONOCYTES NFR BLD AUTO: 5.4 % (ref 5–12)
NEUTROPHILS # BLD AUTO: 4.93 10*3/MM3 (ref 1.7–7)
NEUTROPHILS NFR BLD AUTO: 76.5 % (ref 42.7–76)
NRBC BLD AUTO-RTO: 0 /100 WBC (ref 0–0.2)
PLATELET # BLD AUTO: 216 10*3/MM3 (ref 140–450)
PMV BLD AUTO: 10.3 FL (ref 6–12)
POTASSIUM BLD-SCNC: 3.3 MMOL/L (ref 3.5–5.2)
PROT SERPL-MCNC: 7.6 G/DL (ref 6–8.5)
RBC # BLD AUTO: 3.47 10*6/MM3 (ref 3.77–5.28)
SODIUM BLD-SCNC: 141 MMOL/L (ref 136–145)
WBC NRBC COR # BLD: 6.44 10*3/MM3 (ref 3.4–10.8)

## 2019-11-08 PROCEDURE — 85025 COMPLETE CBC W/AUTO DIFF WBC: CPT | Performed by: EMERGENCY MEDICINE

## 2019-11-08 PROCEDURE — 96374 THER/PROPH/DIAG INJ IV PUSH: CPT

## 2019-11-08 PROCEDURE — 80053 COMPREHEN METABOLIC PANEL: CPT | Performed by: EMERGENCY MEDICINE

## 2019-11-08 PROCEDURE — 25010000002 PROCHLORPERAZINE 10 MG/2ML SOLUTION: Performed by: EMERGENCY MEDICINE

## 2019-11-08 PROCEDURE — 71045 X-RAY EXAM CHEST 1 VIEW: CPT

## 2019-11-08 PROCEDURE — 25010000002 DIPHENHYDRAMINE PER 50 MG: Performed by: EMERGENCY MEDICINE

## 2019-11-08 PROCEDURE — 96375 TX/PRO/DX INJ NEW DRUG ADDON: CPT

## 2019-11-08 PROCEDURE — 93010 ELECTROCARDIOGRAM REPORT: CPT | Performed by: INTERNAL MEDICINE

## 2019-11-08 PROCEDURE — 93005 ELECTROCARDIOGRAM TRACING: CPT | Performed by: EMERGENCY MEDICINE

## 2019-11-08 PROCEDURE — 99284 EMERGENCY DEPT VISIT MOD MDM: CPT

## 2019-11-08 RX ORDER — DIPHENHYDRAMINE HYDROCHLORIDE 50 MG/ML
25 INJECTION INTRAMUSCULAR; INTRAVENOUS ONCE
Status: COMPLETED | OUTPATIENT
Start: 2019-11-08 | End: 2019-11-08

## 2019-11-08 RX ORDER — BUTALBITAL, ACETAMINOPHEN AND CAFFEINE 50; 325; 40 MG/1; MG/1; MG/1
1 TABLET ORAL EVERY 6 HOURS PRN
Qty: 20 TABLET | Refills: 0 | Status: ON HOLD | OUTPATIENT
Start: 2019-11-08 | End: 2020-03-01

## 2019-11-08 RX ORDER — MAGNESIUM OXIDE 400 MG/1
400 TABLET ORAL DAILY
Qty: 30 TABLET | Refills: 0 | Status: SHIPPED | OUTPATIENT
Start: 2019-11-08 | End: 2020-05-22

## 2019-11-08 RX ORDER — PROCHLORPERAZINE EDISYLATE 5 MG/ML
10 INJECTION INTRAMUSCULAR; INTRAVENOUS ONCE
Status: COMPLETED | OUTPATIENT
Start: 2019-11-08 | End: 2019-11-08

## 2019-11-08 RX ADMIN — DIPHENHYDRAMINE HYDROCHLORIDE 25 MG: 50 INJECTION, SOLUTION INTRAMUSCULAR; INTRAVENOUS at 13:40

## 2019-11-08 RX ADMIN — PROCHLORPERAZINE EDISYLATE 10 MG: 5 INJECTION INTRAMUSCULAR; INTRAVENOUS at 13:40

## 2019-11-08 RX ADMIN — SODIUM CHLORIDE, POTASSIUM CHLORIDE, SODIUM LACTATE AND CALCIUM CHLORIDE 1000 ML: 600; 310; 30; 20 INJECTION, SOLUTION INTRAVENOUS at 13:32

## 2019-11-08 NOTE — ED TRIAGE NOTES
Pt reports migraine last went to sleep. Pt woke up still had migraine, nausea and vomiting. Pt reports hx of anxiety, reports started having midsternal chest pain. Pt tire popped in route to ER by PV, called EMS. No injury.

## 2019-11-08 NOTE — ED PROVIDER NOTES
" EMERGENCY DEPARTMENT ENCOUNTER    CHIEF COMPLAINT  Chief Complaint: Chest pain  History given by: pt  History limited by: N/A  Room Number:   PMD: Mekhi Kapoor MD      HPI:  Pt is a 24 y.o. female who presents complaining of moderate constant chest pain that started this morning. Pt admits to SOA, migraine x 2 days, vomiting x 2 days. Pt states she has medications for migraines but is unable to take any of her medications due to her pregnancy.  Pt states she is 16 weeks pregnant and states she is .    Duration/ Onset/ Timing: this morning, gradual, constant  Location: chest  Radiation: none  Quality: \"pain\"  Intensity/Severity: moderate  Progression: unchanged  Associated Symptoms: SOA, migraine x 2 days, vomiting x 2 days  Previous Episodes: Hx of migraines    PAST MEDICAL HISTORY  Active Ambulatory Problems     Diagnosis Date Noted   • Prenatal care, subsequent pregnancy in second trimester 2019   • Other constipation 2019   • Nausea and vomiting in pregnancy 2019   • Migraine without aura and without status migrainosus, not intractable 2019     Resolved Ambulatory Problems     Diagnosis Date Noted   • Pregnancy 2016   • Rubella non-immune status, antepartum 10/05/2016   • Back pain affecting pregnancy in third trimester 2016   • Encounter for supervision of normal first pregnancy in third trimester 2016     Past Medical History:   Diagnosis Date   • Abnormal Pap smear of cervix    • Chlamydia        PAST SURGICAL HISTORY  Past Surgical History:   Procedure Laterality Date   • WISDOM TOOTH EXTRACTION         FAMILY HISTORY  History reviewed. No pertinent family history.    SOCIAL HISTORY  Social History     Socioeconomic History   • Marital status: Single     Spouse name: Not on file   • Number of children: Not on file   • Years of education: Not on file   • Highest education level: Not on file   Tobacco Use   • Smoking status: Never Smoker   Substance " and Sexual Activity   • Alcohol use: No   • Drug use: No   • Sexual activity: Yes       ALLERGIES  Patient has no known allergies.    REVIEW OF SYSTEMS  Review of Systems   Constitutional: Negative for fever.   HENT: Negative for sore throat.    Eyes: Negative.    Respiratory: Negative for cough and shortness of breath.    Cardiovascular: Positive for chest pain (moderate, hurts to breath deep- feels sore).   Gastrointestinal: Positive for nausea (x 2 days) and vomiting (x 2 days). Negative for abdominal pain and diarrhea.   Genitourinary: Negative for dysuria.   Musculoskeletal: Negative for neck pain.   Skin: Negative for rash.   Allergic/Immunologic: Negative.    Neurological: Positive for headaches (migraines x 2 days). Negative for weakness and numbness.   Hematological: Negative.    Psychiatric/Behavioral: Negative.    All other systems reviewed and are negative.      PHYSICAL EXAM  ED Triage Vitals [11/08/19 1257]   Temp Heart Rate Resp BP SpO2   98 °F (36.7 °C) 98 18 128/72 98 %      Temp src Heart Rate Source Patient Position BP Location FiO2 (%)   Oral Monitor -- -- --       Physical Exam   Constitutional: She is oriented to person, place, and time. No distress.   Tearful on exam   HENT:   Head: Normocephalic and atraumatic.   Eyes: EOM are normal. Pupils are equal, round, and reactive to light.   Fundoscopic exam normal   Neck: Normal range of motion. Neck supple. No neck rigidity.   No signs of meningismus   Cardiovascular: Normal rate, regular rhythm and normal heart sounds.   No murmur heard.  Pulmonary/Chest: Effort normal and breath sounds normal. No respiratory distress.   Abdominal: Soft. There is no tenderness. There is no rebound and no guarding.   Musculoskeletal: Normal range of motion. She exhibits no edema.   Neurological: She is alert and oriented to person, place, and time. She has normal sensation and normal strength. No cranial nerve deficit. Gait normal. Coordination normal. GCS score is  15.   Skin: Skin is warm and dry. No rash noted.   Psychiatric: Mood and affect normal.   Nursing note and vitals reviewed.      LAB RESULTS  Lab Results (last 24 hours)     Procedure Component Value Units Date/Time    CBC & Differential [[id number]] Collected:  11/08/19 1333    Specimen:  Blood Updated:  11/08/19 1352    Narrative:       The following orders were created for panel order CBC & Differential.  Procedure                               Abnormality         Status                     ---------                               -----------         ------                     CBC Auto Differential[[id number]]        Abnormal            Final result                 Please view results for these tests on the individual orders.    Comprehensive Metabolic Panel [[id number]]  (Abnormal) Collected:  11/08/19 1333    Specimen:  Blood Updated:  11/08/19 1406     Glucose 80 mg/dL      BUN 8 mg/dL      Creatinine 0.45 mg/dL      Sodium 141 mmol/L      Potassium 3.3 mmol/L      Chloride 104 mmol/L      CO2 22.4 mmol/L      Calcium 9.5 mg/dL      Total Protein 7.6 g/dL      Albumin 4.20 g/dL      ALT (SGPT) 8 U/L      AST (SGOT) 12 U/L      Alkaline Phosphatase 45 U/L      Total Bilirubin 0.5 mg/dL      eGFR Non African Amer >150 mL/min/1.73      Globulin 3.4 gm/dL      A/G Ratio 1.2 g/dL      BUN/Creatinine Ratio 17.8     Anion Gap 14.6 mmol/L     Narrative:       GFR Normal >60  Chronic Kidney Disease <60  Kidney Failure <15    CBC Auto Differential [[id number]]  (Abnormal) Collected:  11/08/19 1333    Specimen:  Blood Updated:  11/08/19 1352     WBC 6.44 10*3/mm3      RBC 3.47 10*6/mm3      Hemoglobin 10.8 g/dL      Hematocrit 31.3 %      MCV 90.2 fL      MCH 31.1 pg      MCHC 34.5 g/dL      RDW 12.8 %      RDW-SD 41.6 fl      MPV 10.3 fL      Platelets 216 10*3/mm3      Neutrophil % 76.5 %      Lymphocyte % 15.7 %      Monocyte % 5.4 %      Eosinophil % 2.0 %      Basophil % 0.2 %      Immature Grans % 0.2 %       Neutrophils, Absolute 4.93 10*3/mm3      Lymphocytes, Absolute 1.01 10*3/mm3      Monocytes, Absolute 0.35 10*3/mm3      Eosinophils, Absolute 0.13 10*3/mm3      Basophils, Absolute 0.01 10*3/mm3      Immature Grans, Absolute 0.01 10*3/mm3      nRBC 0.0 /100 WBC           I ordered the above labs and reviewed the results    RADIOLOGY  XR Chest 1 View   Final Result   No evidence for acute pulmonary process. Follow-up as   clinical indications persist.       This report was finalized on 11/8/2019 1:51 PM by Dr. Ryan Jade M.D.               I ordered the above noted radiological studies. Interpreted by radiologist. Reviewed by me in PACS.       PROCEDURES  Procedures    EKG         EKG time: 1318   Rhythm/Rate: sinus rhythm, rate: 80  Normal P waves and normal NM interval   Normal QRS, Normal axis  Normal ST and T waves    Interpreted Contemporaneously by me, independently viewed  No prior for comparison      PROGRESS AND CONSULTS       Medications   lactated ringers bolus 1,000 mL (1,000 mL Intravenous New Bag 11/8/19 1332)   prochlorperazine (COMPAZINE) injection 10 mg (10 mg Intravenous Given 11/8/19 1340)   diphenhydrAMINE (BENADRYL) injection 25 mg (25 mg Intravenous Given 11/8/19 1340)       1315  Discussed plan to do labs, chest XR, and try to give medications for symptom relief. Pt understands and agrees with the plan. All questions have been answered.    1335  Discussed case with SHRADDHA Roche  Reviewed history, exam, results and treatments.  Discussed concerns and plan of care. Dr Chamorro recommends started pt on magnesium oxcide (400mg daily) and Fioricet for the pt to take as needed for migraines.     1347  Rechecked patient who is actively vomiting. Discussed plan to give nausea medications. Pt understands and agrees with the plan. All questions have been answered.    1511  Rechecked patient who is resting comfortably and states feeling much better. Discussed all lab and test results.  Discussed plan to discharge the pt. Pt understands and agrees with the plan. All questions have been answered.    MEDICAL DECISION MAKING  Results were reviewed/discussed with the patient and they were also made aware of online access. Pt also made aware that some labs, such as cultures, will not be resulted during ER visit and follow up with PMD is necessary.     MDM  Number of Diagnoses or Management Options     Amount and/or Complexity of Data Reviewed  Clinical lab tests: ordered and reviewed (Potassium 3.3)  Tests in the radiology section of CPT®: ordered and reviewed (Chest XR - NAD)  Tests in the medicine section of CPT®: reviewed and ordered (Refer to the procedure section of the note for EKG results)  Decide to obtain previous medical records or to obtain history from someone other than the patient: yes (Epic)  Review and summarize past medical records: yes (Saw OBGYN on 11/6/19 and c/o of headache at that time. Pt was referred to neurology. Neurology appointment is scheduled for 01/2020.)  Discuss the patient with other providers: yes (Dr Chamorro)           DIAGNOSIS  Final diagnoses:   Migraine without aura and without status migrainosus, not intractable   Atypical chest pain   Second trimester pregnancy       DISPOSITION  DISCHARGE    Patient discharged in stable condition.    Reviewed implications of results, diagnosis, meds, responsibility to follow up, warning signs and symptoms of possible worsening, potential complications and reasons to return to ER.    Patient/Family voiced understanding of above instructions.    Discussed plan for discharge, as there is no emergent indication for admission. Patient referred to primary care provider for BP management due to today's BP. Pt/family is agreeable and understands need for follow up and repeat testing.  Pt is aware that discharge does not mean that nothing is wrong but it indicates no emergency is present that requires admission and they must continue  care with follow-up as given below or physician of their choice.     FOLLOW-UP  Mekhi Kapoor MD  [address]    [place] [address]  [phone]      If symptoms worsen or do not improve         Medication List      New Prescriptions    butalbital-acetaminophen-caffeine -40 MG per tablet  Commonly known as:  FIORICET, ESGIC  Take 1 tablet by mouth Every 6 (Six) Hours As Needed for Headache.     magnesium oxide 400 MG tablet  Commonly known as:  MAG-OX  Take 1 tablet by mouth Daily.              Latest Documented Vital Signs:  As of 3:14 PM  BP- 97/58 HR- 83 Temp- 98 °F (36.7 °C) (Oral) O2 sat- 99%    --  Documentation assistance provided by stefanie Crawford for Dr White.  Information recorded by the scribe was done at my direction and has been verified and validated by me.     Jacque Crawford  11/08/19 1514       Richard White MD  11/08/19 [id number]

## 2019-12-04 ENCOUNTER — PROCEDURE VISIT (OUTPATIENT)
Dept: OBSTETRICS AND GYNECOLOGY | Facility: CLINIC | Age: 24
End: 2019-12-04

## 2019-12-04 ENCOUNTER — ROUTINE PRENATAL (OUTPATIENT)
Dept: OBSTETRICS AND GYNECOLOGY | Facility: CLINIC | Age: 24
End: 2019-12-04

## 2019-12-04 VITALS — DIASTOLIC BLOOD PRESSURE: 76 MMHG | BODY MASS INDEX: 26.95 KG/M2 | SYSTOLIC BLOOD PRESSURE: 110 MMHG | WEIGHT: 138 LBS

## 2019-12-04 DIAGNOSIS — Z34.82 PRENATAL CARE, SUBSEQUENT PREGNANCY IN SECOND TRIMESTER: Primary | ICD-10-CM

## 2019-12-04 DIAGNOSIS — G43.009 MIGRAINE WITHOUT AURA AND WITHOUT STATUS MIGRAINOSUS, NOT INTRACTABLE: ICD-10-CM

## 2019-12-04 DIAGNOSIS — Z36.3 SCREENING, ANTENATAL, FOR MALFORMATION BY ULTRASOUND: Primary | ICD-10-CM

## 2019-12-04 PROBLEM — O21.9 NAUSEA AND VOMITING IN PREGNANCY: Status: RESOLVED | Noted: 2019-09-11 | Resolved: 2019-12-04

## 2019-12-04 PROCEDURE — 76805 OB US >/= 14 WKS SNGL FETUS: CPT | Performed by: OBSTETRICS & GYNECOLOGY

## 2019-12-04 PROCEDURE — 0502F SUBSEQUENT PRENATAL CARE: CPT | Performed by: OBSTETRICS & GYNECOLOGY

## 2019-12-04 NOTE — PROGRESS NOTES
CC: 20w2d IUP  Fetus active  No ctx or bleeding  Anatomy screen reassuring  ROS: Headaches less frequent  10 pound weight gain  A: AGA  P: GTS 4 weeks

## 2020-01-03 DIAGNOSIS — O21.9 NAUSEA AND VOMITING IN PREGNANCY: ICD-10-CM

## 2020-01-03 DIAGNOSIS — Z34.81 PRENATAL CARE, SUBSEQUENT PREGNANCY IN FIRST TRIMESTER: ICD-10-CM

## 2020-01-06 RX ORDER — PROMETHAZINE HYDROCHLORIDE 12.5 MG/1
12.5 TABLET ORAL EVERY 6 HOURS PRN
Qty: 30 TABLET | Refills: 0 | Status: SHIPPED | OUTPATIENT
Start: 2020-01-06 | End: 2020-03-05 | Stop reason: SDUPTHER

## 2020-01-08 ENCOUNTER — ROUTINE PRENATAL (OUTPATIENT)
Dept: OBSTETRICS AND GYNECOLOGY | Facility: CLINIC | Age: 25
End: 2020-01-08

## 2020-01-08 VITALS — DIASTOLIC BLOOD PRESSURE: 70 MMHG | BODY MASS INDEX: 28.32 KG/M2 | WEIGHT: 145 LBS | SYSTOLIC BLOOD PRESSURE: 100 MMHG

## 2020-01-08 DIAGNOSIS — Z34.82 PRENATAL CARE, SUBSEQUENT PREGNANCY IN SECOND TRIMESTER: Primary | ICD-10-CM

## 2020-01-08 LAB
GLUCOSE UR STRIP-MCNC: NEGATIVE MG/DL
PROT UR STRIP-MCNC: NEGATIVE MG/DL

## 2020-01-08 PROCEDURE — 99212 OFFICE O/P EST SF 10 MIN: CPT | Performed by: OBSTETRICS & GYNECOLOGY

## 2020-01-08 NOTE — PROGRESS NOTES
CC: 25w2d IUP  Fetus active  No pain or LOF  ROS: very emotional-denies depression sx  A: 25 week IUP  P: GTS today  A pos

## 2020-01-09 LAB
BASOPHILS # BLD AUTO: 0.02 10*3/MM3 (ref 0–0.2)
BASOPHILS NFR BLD AUTO: 0.2 % (ref 0–1.5)
BLD GP AB SCN SERPL QL: NEGATIVE
EOSINOPHIL # BLD AUTO: 0.13 10*3/MM3 (ref 0–0.4)
EOSINOPHIL NFR BLD AUTO: 1.6 % (ref 0.3–6.2)
ERYTHROCYTE [DISTWIDTH] IN BLOOD BY AUTOMATED COUNT: 12.1 % (ref 12.3–15.4)
GLUCOSE 1H P 50 G GLC PO SERPL-MCNC: 57 MG/DL (ref 65–179)
HCT VFR BLD AUTO: 30.8 % (ref 34–46.6)
HGB BLD-MCNC: 10.3 G/DL (ref 12–15.9)
IMM GRANULOCYTES # BLD AUTO: 0.02 10*3/MM3 (ref 0–0.05)
IMM GRANULOCYTES NFR BLD AUTO: 0.2 % (ref 0–0.5)
LYMPHOCYTES # BLD AUTO: 0.98 10*3/MM3 (ref 0.7–3.1)
LYMPHOCYTES NFR BLD AUTO: 12.2 % (ref 19.6–45.3)
MCH RBC QN AUTO: 31.3 PG (ref 26.6–33)
MCHC RBC AUTO-ENTMCNC: 33.4 G/DL (ref 31.5–35.7)
MCV RBC AUTO: 93.6 FL (ref 79–97)
MONOCYTES # BLD AUTO: 0.55 10*3/MM3 (ref 0.1–0.9)
MONOCYTES NFR BLD AUTO: 6.8 % (ref 5–12)
NEUTROPHILS # BLD AUTO: 6.34 10*3/MM3 (ref 1.7–7)
NEUTROPHILS NFR BLD AUTO: 79 % (ref 42.7–76)
NRBC BLD AUTO-RTO: 0 /100 WBC (ref 0–0.2)
PLATELET # BLD AUTO: 213 10*3/MM3 (ref 140–450)
RBC # BLD AUTO: 3.29 10*6/MM3 (ref 3.77–5.28)
WBC # BLD AUTO: 8.04 10*3/MM3 (ref 3.4–10.8)

## 2020-01-09 RX ORDER — FERROUS SULFATE 325(65) MG
325 TABLET ORAL
Qty: 30 TABLET | Refills: 3 | Status: ON HOLD | OUTPATIENT
Start: 2020-01-09 | End: 2020-03-01

## 2020-02-05 ENCOUNTER — ROUTINE PRENATAL (OUTPATIENT)
Dept: OBSTETRICS AND GYNECOLOGY | Facility: CLINIC | Age: 25
End: 2020-02-05

## 2020-02-05 VITALS — BODY MASS INDEX: 29.69 KG/M2 | SYSTOLIC BLOOD PRESSURE: 100 MMHG | WEIGHT: 152 LBS | DIASTOLIC BLOOD PRESSURE: 70 MMHG

## 2020-02-05 DIAGNOSIS — Z34.83 PRENATAL CARE, SUBSEQUENT PREGNANCY IN THIRD TRIMESTER: Primary | ICD-10-CM

## 2020-02-05 DIAGNOSIS — D50.9 IRON DEFICIENCY ANEMIA DURING PREGNANCY: ICD-10-CM

## 2020-02-05 DIAGNOSIS — O99.019 IRON DEFICIENCY ANEMIA DURING PREGNANCY: ICD-10-CM

## 2020-02-05 LAB
GLUCOSE UR STRIP-MCNC: NEGATIVE MG/DL
PROT UR STRIP-MCNC: NEGATIVE MG/DL

## 2020-02-05 PROCEDURE — 99213 OFFICE O/P EST LOW 20 MIN: CPT | Performed by: OBSTETRICS & GYNECOLOGY

## 2020-02-05 NOTE — PROGRESS NOTES
CC: 29w2d IUP  Fetus very active  No ctx or bleeding  ROS: denies LOF  GTS WNL  A: 29 week IUP  P: on Fe for anemia

## 2020-02-26 ENCOUNTER — ROUTINE PRENATAL (OUTPATIENT)
Dept: OBSTETRICS AND GYNECOLOGY | Facility: CLINIC | Age: 25
End: 2020-02-26

## 2020-02-26 VITALS — WEIGHT: 162 LBS | SYSTOLIC BLOOD PRESSURE: 126 MMHG | BODY MASS INDEX: 31.64 KG/M2 | DIASTOLIC BLOOD PRESSURE: 70 MMHG

## 2020-02-26 DIAGNOSIS — O99.343 DEPRESSION AFFECTING PREGNANCY IN THIRD TRIMESTER, ANTEPARTUM: ICD-10-CM

## 2020-02-26 DIAGNOSIS — Z34.83 PRENATAL CARE, SUBSEQUENT PREGNANCY IN THIRD TRIMESTER: Primary | ICD-10-CM

## 2020-02-26 DIAGNOSIS — F32.A DEPRESSION AFFECTING PREGNANCY IN THIRD TRIMESTER, ANTEPARTUM: ICD-10-CM

## 2020-02-26 LAB
GLUCOSE UR STRIP-MCNC: NEGATIVE MG/DL
PROT UR STRIP-MCNC: NEGATIVE MG/DL

## 2020-02-26 PROCEDURE — 99213 OFFICE O/P EST LOW 20 MIN: CPT | Performed by: OBSTETRICS & GYNECOLOGY

## 2020-02-26 RX ORDER — FLUOXETINE 10 MG/1
10 CAPSULE ORAL DAILY
Qty: 30 CAPSULE | Refills: 3 | Status: ON HOLD | OUTPATIENT
Start: 2020-02-26 | End: 2020-03-01

## 2020-02-26 NOTE — PROGRESS NOTES
CC: 32w2d IUP  Fetus active  No ctx  Has RLQ pain when walking   ROS: Unexplained crying spells the last week with difficulty sleeping  + Stress  A: Anxiety/depression associated with stress  P: Try Prozac   Discussed mental health consult if unimproved

## 2020-03-01 ENCOUNTER — HOSPITAL ENCOUNTER (INPATIENT)
Facility: HOSPITAL | Age: 25
LOS: 3 days | Discharge: HOME | End: 2020-03-04
Attending: OBSTETRICS & GYNECOLOGY | Admitting: OBSTETRICS & GYNECOLOGY

## 2020-03-01 ENCOUNTER — HOSPITAL ENCOUNTER (EMERGENCY)
Facility: HOSPITAL | Age: 25
End: 2020-03-01

## 2020-03-01 ENCOUNTER — APPOINTMENT (OUTPATIENT)
Dept: ULTRASOUND IMAGING | Facility: HOSPITAL | Age: 25
End: 2020-03-01

## 2020-03-01 PROBLEM — Z34.90 PREGNANCY: Status: ACTIVE | Noted: 2020-03-01

## 2020-03-01 PROBLEM — O99.891 KIDNEY STONE COMPLICATING PREGNANCY, THIRD TRIMESTER: Status: ACTIVE | Noted: 2020-03-01

## 2020-03-01 PROBLEM — N20.0 KIDNEY STONE COMPLICATING PREGNANCY, THIRD TRIMESTER: Status: ACTIVE | Noted: 2020-03-01

## 2020-03-01 PROBLEM — O26.833 KIDNEY STONE COMPLICATING PREGNANCY, THIRD TRIMESTER: Status: ACTIVE | Noted: 2020-03-01

## 2020-03-01 LAB
ALBUMIN SERPL-MCNC: 3.7 G/DL (ref 3.5–5.2)
ALBUMIN/GLOB SERPL: 1.2 G/DL
ALP SERPL-CCNC: 79 U/L (ref 39–117)
ALT SERPL W P-5'-P-CCNC: 7 U/L (ref 1–33)
ANION GAP SERPL CALCULATED.3IONS-SCNC: 15.3 MMOL/L (ref 5–15)
AST SERPL-CCNC: 11 U/L (ref 1–32)
BACTERIA UR QL AUTO: ABNORMAL /HPF
BASOPHILS # BLD AUTO: 0.03 10*3/MM3 (ref 0–0.2)
BASOPHILS NFR BLD AUTO: 0.2 % (ref 0–1.5)
BILIRUB SERPL-MCNC: <0.2 MG/DL (ref 0.2–1.2)
BILIRUB UR QL STRIP: NEGATIVE
BUN BLD-MCNC: 7 MG/DL (ref 6–20)
BUN/CREAT SERPL: 15.9 (ref 7–25)
CALCIUM SPEC-SCNC: 8.8 MG/DL (ref 8.6–10.5)
CHLORIDE SERPL-SCNC: 102 MMOL/L (ref 98–107)
CLARITY UR: ABNORMAL
CO2 SERPL-SCNC: 18.7 MMOL/L (ref 22–29)
COLOR UR: YELLOW
CREAT BLD-MCNC: 0.44 MG/DL (ref 0.57–1)
DEPRECATED RDW RBC AUTO: 41.4 FL (ref 37–54)
EOSINOPHIL # BLD AUTO: 0.11 10*3/MM3 (ref 0–0.4)
EOSINOPHIL NFR BLD AUTO: 0.8 % (ref 0.3–6.2)
ERYTHROCYTE [DISTWIDTH] IN BLOOD BY AUTOMATED COUNT: 12.5 % (ref 12.3–15.4)
GFR SERPL CREATININE-BSD FRML MDRD: >150 ML/MIN/1.73
GLOBULIN UR ELPH-MCNC: 3.1 GM/DL
GLUCOSE BLD-MCNC: 99 MG/DL (ref 65–99)
GLUCOSE UR STRIP-MCNC: NEGATIVE MG/DL
HCT VFR BLD AUTO: 29.3 % (ref 34–46.6)
HGB BLD-MCNC: 10 G/DL (ref 12–15.9)
HGB UR QL STRIP.AUTO: ABNORMAL
HYALINE CASTS UR QL AUTO: ABNORMAL /LPF
IMM GRANULOCYTES # BLD AUTO: 0.15 10*3/MM3 (ref 0–0.05)
IMM GRANULOCYTES NFR BLD AUTO: 1 % (ref 0–0.5)
KETONES UR QL STRIP: NEGATIVE
LEUKOCYTE ESTERASE UR QL STRIP.AUTO: ABNORMAL
LYMPHOCYTES # BLD AUTO: 1.09 10*3/MM3 (ref 0.7–3.1)
LYMPHOCYTES NFR BLD AUTO: 7.6 % (ref 19.6–45.3)
MCH RBC QN AUTO: 31.3 PG (ref 26.6–33)
MCHC RBC AUTO-ENTMCNC: 34.1 G/DL (ref 31.5–35.7)
MCV RBC AUTO: 91.8 FL (ref 79–97)
MONOCYTES # BLD AUTO: 0.85 10*3/MM3 (ref 0.1–0.9)
MONOCYTES NFR BLD AUTO: 5.9 % (ref 5–12)
NEUTROPHILS # BLD AUTO: 12.13 10*3/MM3 (ref 1.7–7)
NEUTROPHILS NFR BLD AUTO: 84.5 % (ref 42.7–76)
NITRITE UR QL STRIP: NEGATIVE
NRBC BLD AUTO-RTO: 0 /100 WBC (ref 0–0.2)
PH UR STRIP.AUTO: <=5 [PH] (ref 5–8)
PLATELET # BLD AUTO: 185 10*3/MM3 (ref 140–450)
PMV BLD AUTO: 10.7 FL (ref 6–12)
POTASSIUM BLD-SCNC: 3.7 MMOL/L (ref 3.5–5.2)
PROT SERPL-MCNC: 6.8 G/DL (ref 6–8.5)
PROT UR QL STRIP: ABNORMAL
RBC # BLD AUTO: 3.19 10*6/MM3 (ref 3.77–5.28)
RBC # UR: ABNORMAL /HPF
REF LAB TEST METHOD: ABNORMAL
SODIUM BLD-SCNC: 136 MMOL/L (ref 136–145)
SP GR UR STRIP: 1.02 (ref 1–1.03)
SQUAMOUS #/AREA URNS HPF: ABNORMAL /HPF
UROBILINOGEN UR QL STRIP: ABNORMAL
WBC NRBC COR # BLD: 14.36 10*3/MM3 (ref 3.4–10.8)
WBC UR QL AUTO: ABNORMAL /HPF

## 2020-03-01 PROCEDURE — 59025 FETAL NON-STRESS TEST: CPT | Performed by: OBSTETRICS & GYNECOLOGY

## 2020-03-01 PROCEDURE — 87088 URINE BACTERIA CULTURE: CPT | Performed by: OBSTETRICS & GYNECOLOGY

## 2020-03-01 PROCEDURE — 85025 COMPLETE CBC W/AUTO DIFF WBC: CPT | Performed by: OBSTETRICS & GYNECOLOGY

## 2020-03-01 PROCEDURE — 81001 URINALYSIS AUTO W/SCOPE: CPT | Performed by: OBSTETRICS & GYNECOLOGY

## 2020-03-01 PROCEDURE — 87186 SC STD MICRODIL/AGAR DIL: CPT | Performed by: OBSTETRICS & GYNECOLOGY

## 2020-03-01 PROCEDURE — 25010000002 BUTORPHANOL PER 1 MG: Performed by: OBSTETRICS & GYNECOLOGY

## 2020-03-01 PROCEDURE — 87086 URINE CULTURE/COLONY COUNT: CPT | Performed by: OBSTETRICS & GYNECOLOGY

## 2020-03-01 PROCEDURE — 59025 FETAL NON-STRESS TEST: CPT

## 2020-03-01 PROCEDURE — 25010000002 BUTORPHANOL PER 1 MG

## 2020-03-01 PROCEDURE — 76775 US EXAM ABDO BACK WALL LIM: CPT

## 2020-03-01 PROCEDURE — 80053 COMPREHEN METABOLIC PANEL: CPT | Performed by: OBSTETRICS & GYNECOLOGY

## 2020-03-01 PROCEDURE — 99222 1ST HOSP IP/OBS MODERATE 55: CPT | Performed by: OBSTETRICS & GYNECOLOGY

## 2020-03-01 RX ORDER — SODIUM CHLORIDE, SODIUM LACTATE, POTASSIUM CHLORIDE, CALCIUM CHLORIDE 600; 310; 30; 20 MG/100ML; MG/100ML; MG/100ML; MG/100ML
125 INJECTION, SOLUTION INTRAVENOUS CONTINUOUS
Status: DISCONTINUED | OUTPATIENT
Start: 2020-03-01 | End: 2020-03-04 | Stop reason: HOSPADM

## 2020-03-01 RX ORDER — BUTORPHANOL TARTRATE 1 MG/ML
INJECTION, SOLUTION INTRAMUSCULAR; INTRAVENOUS
Status: COMPLETED
Start: 2020-03-01 | End: 2020-03-01

## 2020-03-01 RX ORDER — SODIUM CHLORIDE, SODIUM LACTATE, POTASSIUM CHLORIDE, CALCIUM CHLORIDE 600; 310; 30; 20 MG/100ML; MG/100ML; MG/100ML; MG/100ML
125 INJECTION, SOLUTION INTRAVENOUS CONTINUOUS
Status: DISCONTINUED | OUTPATIENT
Start: 2020-03-01 | End: 2020-03-01

## 2020-03-01 RX ORDER — SODIUM CHLORIDE, SODIUM LACTATE, POTASSIUM CHLORIDE, CALCIUM CHLORIDE 600; 310; 30; 20 MG/100ML; MG/100ML; MG/100ML; MG/100ML
999 INJECTION, SOLUTION INTRAVENOUS ONCE
Status: COMPLETED | OUTPATIENT
Start: 2020-03-01 | End: 2020-03-01

## 2020-03-01 RX ORDER — DIPHENHYDRAMINE HYDROCHLORIDE 50 MG/ML
12.5 INJECTION INTRAMUSCULAR; INTRAVENOUS EVERY 8 HOURS PRN
Status: DISCONTINUED | OUTPATIENT
Start: 2020-03-01 | End: 2020-03-01

## 2020-03-01 RX ORDER — ACETAMINOPHEN 325 MG/1
650 TABLET ORAL EVERY 6 HOURS
Status: DISCONTINUED | OUTPATIENT
Start: 2020-03-01 | End: 2020-03-02

## 2020-03-01 RX ORDER — ONDANSETRON 2 MG/ML
4 INJECTION INTRAMUSCULAR; INTRAVENOUS ONCE AS NEEDED
Status: COMPLETED | OUTPATIENT
Start: 2020-03-01 | End: 2020-03-02

## 2020-03-01 RX ORDER — EPHEDRINE SULFATE 50 MG/ML
5 INJECTION, SOLUTION INTRAVENOUS AS NEEDED
Status: DISCONTINUED | OUTPATIENT
Start: 2020-03-01 | End: 2020-03-01

## 2020-03-01 RX ORDER — SODIUM CHLORIDE 0.9 % (FLUSH) 0.9 %
10 SYRINGE (ML) INJECTION AS NEEDED
Status: DISCONTINUED | OUTPATIENT
Start: 2020-03-01 | End: 2020-03-04 | Stop reason: HOSPADM

## 2020-03-01 RX ORDER — SODIUM CHLORIDE 0.9 % (FLUSH) 0.9 %
10 SYRINGE (ML) INJECTION EVERY 12 HOURS SCHEDULED
Status: DISCONTINUED | OUTPATIENT
Start: 2020-03-01 | End: 2020-03-04 | Stop reason: HOSPADM

## 2020-03-01 RX ORDER — BUTORPHANOL TARTRATE 1 MG/ML
1 INJECTION, SOLUTION INTRAMUSCULAR; INTRAVENOUS EVERY 4 HOURS PRN
Status: DISCONTINUED | OUTPATIENT
Start: 2020-03-01 | End: 2020-03-01

## 2020-03-01 RX ORDER — FAMOTIDINE 10 MG/ML
20 INJECTION, SOLUTION INTRAVENOUS ONCE AS NEEDED
Status: COMPLETED | OUTPATIENT
Start: 2020-03-01 | End: 2020-03-02

## 2020-03-01 RX ORDER — BUTORPHANOL TARTRATE 1 MG/ML
1 INJECTION, SOLUTION INTRAMUSCULAR; INTRAVENOUS
Status: DISCONTINUED | OUTPATIENT
Start: 2020-03-01 | End: 2020-03-04 | Stop reason: HOSPADM

## 2020-03-01 RX ADMIN — BUTORPHANOL TARTRATE 1 MG: 1 INJECTION, SOLUTION INTRAMUSCULAR; INTRAVENOUS at 07:05

## 2020-03-01 RX ADMIN — SODIUM CHLORIDE, POTASSIUM CHLORIDE, SODIUM LACTATE AND CALCIUM CHLORIDE 125 ML/HR: 600; 310; 30; 20 INJECTION, SOLUTION INTRAVENOUS at 17:23

## 2020-03-01 RX ADMIN — BUTORPHANOL TARTRATE 1 MG: 1 INJECTION, SOLUTION INTRAMUSCULAR; INTRAVENOUS at 16:09

## 2020-03-01 RX ADMIN — BUTORPHANOL TARTRATE 1 MG: 1 INJECTION, SOLUTION INTRAMUSCULAR; INTRAVENOUS at 09:25

## 2020-03-01 RX ADMIN — SODIUM CHLORIDE, POTASSIUM CHLORIDE, SODIUM LACTATE AND CALCIUM CHLORIDE 999 ML/HR: 600; 310; 30; 20 INJECTION, SOLUTION INTRAVENOUS at 07:00

## 2020-03-01 RX ADMIN — ACETAMINOPHEN 650 MG: 325 TABLET, FILM COATED ORAL at 22:54

## 2020-03-01 RX ADMIN — SODIUM CHLORIDE, POTASSIUM CHLORIDE, SODIUM LACTATE AND CALCIUM CHLORIDE 125 ML/HR: 600; 310; 30; 20 INJECTION, SOLUTION INTRAVENOUS at 08:16

## 2020-03-01 RX ADMIN — BUTORPHANOL TARTRATE 1 MG: 1 INJECTION, SOLUTION INTRAMUSCULAR; INTRAVENOUS at 12:24

## 2020-03-01 RX ADMIN — BUTORPHANOL TARTRATE 1 MG: 1 INJECTION, SOLUTION INTRAMUSCULAR; INTRAVENOUS at 21:05

## 2020-03-01 RX ADMIN — ACETAMINOPHEN 650 MG: 325 TABLET, FILM COATED ORAL at 15:18

## 2020-03-01 NOTE — PLAN OF CARE
Problem: Patient Care Overview  Goal: Plan of Care Review  Outcome: Ongoing (interventions implemented as appropriate)  Flowsheets (Taken 3/1/2020 1723)  Progress: improving  Plan of Care Reviewed With: patient; significant other  Goal: Individualization and Mutuality  Outcome: Ongoing (interventions implemented as appropriate)  Flowsheets (Taken 3/1/2020 1723)  Patient Specific Goals (Include Timeframe): pain control  How to Address Anxieties/Fears: n/a  Patient Specific Interventions: pain meds as ordered  What Information Would Help Us Give You More Personalized Care?: keep informed of plan of care  How Would You and/or Your Support Person Like to Participate in Your Care?: be as involved in decision making as possible  What Anxieties, Fears, Concerns, or Questions Do You Have About Your Care?: n/a  Patient Specific Preferences: go home as soon as possible  Goal: Discharge Needs Assessment  Outcome: Ongoing (interventions implemented as appropriate)  Flowsheets  Taken 3/1/2020 1723  Equipment Needed After Discharge: none  Anticipated Changes Related to Illness: none  Transportation Concerns: car, none  Concerns to be Addressed: no discharge needs identified  Readmission Within the Last 30 Days: no previous admission in last 30 days  Offered/Gave Vendor List: no  Taken 3/1/2020 1329  Equipment Currently Used at Home: none  Taken 3/1/2020 1328  Transportation Anticipated: car, drives self;family or friend will provide  Patient/Family Anticipated Services at Transition: none  Patient/Family Anticipates Transition to: home with family  Goal: Interprofessional Rounds/Family Conf  Outcome: Ongoing (interventions implemented as appropriate)  Flowsheets (Taken 3/1/2020 1723)  Participants: family; nursing; physician; patient     Problem: Prenatal Patient, Hospitalized (Adult,Obstetrics,Pediatric)  Goal: Signs and Symptoms of Listed Potential Problems Will be Absent, Minimized or Managed (Prenatal Patient,  Hospitalized)  Outcome: Ongoing (interventions implemented as appropriate)  Flowsheets (Taken 3/1/2020 4509)  Problems Assessed (Prenatal Patient): all  Problems Present (Prenatal Patient): none

## 2020-03-01 NOTE — H&P
Patient Care Team:  Mekhi Kapoor MD as PCP - General (Obstetrics and Gynecology)    Chief complaint Back pain    Subjective     Patient is a 24 y.o. female presented at 32w6d with excruciating back pain.  Patient states the pain started suddenly this morning and is constant.  She does feel that there are waves where the pain intensifies and she has associated nausea and vomiting with the pain.  Patient denies any previous history of kidney stones.  She does report having a UTI in early pregnancy and completed antibiotics.  She denies any contraction-like pain, vaginal bleeding.  She does report good fetal movement.    Review of Systems   Pertinent items are noted in HPI, all other systems reviewed and negative    History  Past Medical History:   Diagnosis Date   • Abnormal Pap smear of cervix    • Chlamydia     3 years ago     Past Surgical History:   Procedure Laterality Date   • WISDOM TOOTH EXTRACTION       No family history on file.  Social History     Tobacco Use   • Smoking status: Never Smoker   Substance Use Topics   • Alcohol use: No   • Drug use: No     Medications Prior to Admission   Medication Sig Dispense Refill Last Dose   • magnesium oxide (MAG-OX) 400 MG tablet Take 1 tablet by mouth Daily. 30 tablet 0 Past Week at Unknown time   • promethazine (PHENERGAN) 12.5 MG tablet Take 1 tablet by mouth Every 6 (Six) Hours As Needed for Nausea or Vomiting. 30 tablet 0 3/1/2020 at 0200   • Prenatal Vit-Fe Fumarate-FA (PRENATAL, CLASSIC, VITAMIN) 28-0.8 MG tablet tablet Take 1 tablet by mouth Daily. 30 tablet 11 More than a month at Unknown time     Allergies:  Patient has no known allergies.    Objective     Vital Signs  Temp:  [98.4 °F (36.9 °C)] 98.4 °F (36.9 °C)  Heart Rate:  [] 88  Resp:  [22] 22  BP: (100-138)/(56-75) 100/56    Physical Exam:    General Appearance: alert, appears stated age and cooperative  Back: Tenderness to palpation along the entire back, no specific CVA  tenderness  Lungs: clear to auscultation, respirations regular, respirations even and respirations unlabored  Heart: regular rhythm & normal rate  Abdomen: normal bowel sounds, soft non-tender, no guarding, no rebound tenderness and Gravid uterus consistent with dates  Pelvic: Cervix closed per nursing  Extremities: moves extremities well, no edema, no cyanosis and no redness    Results Review:    I reviewed the patient's new clinical results.    Assessment/Plan       Kidney stone complicating pregnancy, third trimester    Pregnancy    Patient's clinical picture is consistent with kidney stone.  She does get some pain relief with IV Stadol.  An ultrasound was performed and shows a 7 mm echogenic focus in the left kidney that may be a nonobstructive stone.  We will strain patient's urine.  Patient has no findings concerning for pyelonephritis.  Urine culture is pending.  Discussed with patient that we will observe her and IV hydrate.  If pain does not improve by tomorrow morning, we will consider consulting urology.  She is in agreement with plan of care.    I discussed the patients findings and my recommendations with patient.     Flor Rothman MD  03/01/20  1:28 PM    Time: More than 50% of time spent in counseling and coordination of care:  Total face-to-face/floor time 50 min.  Time spent in counseling 30 min. Counseling included the following topics: ultrasound findings and plan of care

## 2020-03-01 NOTE — NON STRESS TEST
Fe Smith, a  at 32w6d with an CALEB of 2020, by Last Menstrual Period, was seen at [facility] LABOR DELIVERY for a nonstress test.    Chief Complaint   Patient presents with   • Back Pain     Pt to L/D with c/o back pain that started around 0200.  Pt denies history of kidney stones.       Patient Active Problem List   Diagnosis   • Prenatal care, subsequent pregnancy in third trimester   • Other constipation   • Migraine without aura and without status migrainosus, not intractable   • Iron deficiency anemia during pregnancy   • Pregnancy       Start Time: 626  Stop Time: 916

## 2020-03-02 PROCEDURE — 25010000002 BUTORPHANOL PER 1 MG: Performed by: OBSTETRICS & GYNECOLOGY

## 2020-03-02 PROCEDURE — 99232 SBSQ HOSP IP/OBS MODERATE 35: CPT | Performed by: OBSTETRICS & GYNECOLOGY

## 2020-03-02 PROCEDURE — 59025 FETAL NON-STRESS TEST: CPT

## 2020-03-02 PROCEDURE — 59025 FETAL NON-STRESS TEST: CPT | Performed by: OBSTETRICS & GYNECOLOGY

## 2020-03-02 PROCEDURE — 25010000002 ONDANSETRON PER 1 MG: Performed by: OBSTETRICS & GYNECOLOGY

## 2020-03-02 PROCEDURE — 25010000002 CEFTRIAXONE PER 250 MG: Performed by: OBSTETRICS & GYNECOLOGY

## 2020-03-02 PROCEDURE — 25010000002 ONDANSETRON PER 1 MG: Performed by: ANESTHESIOLOGY

## 2020-03-02 RX ORDER — CEFTRIAXONE SODIUM 1 G/50ML
1 INJECTION, SOLUTION INTRAVENOUS EVERY 24 HOURS
Status: COMPLETED | OUTPATIENT
Start: 2020-03-02 | End: 2020-03-02

## 2020-03-02 RX ORDER — ONDANSETRON 2 MG/ML
4 INJECTION INTRAMUSCULAR; INTRAVENOUS EVERY 6 HOURS PRN
Status: DISCONTINUED | OUTPATIENT
Start: 2020-03-02 | End: 2020-03-04 | Stop reason: HOSPADM

## 2020-03-02 RX ORDER — OXYCODONE HYDROCHLORIDE 5 MG/1
10 TABLET ORAL EVERY 4 HOURS PRN
Status: DISCONTINUED | OUTPATIENT
Start: 2020-03-02 | End: 2020-03-04 | Stop reason: HOSPADM

## 2020-03-02 RX ORDER — ACETAMINOPHEN 500 MG
1000 TABLET ORAL EVERY 8 HOURS PRN
Status: DISCONTINUED | OUTPATIENT
Start: 2020-03-02 | End: 2020-03-04 | Stop reason: HOSPADM

## 2020-03-02 RX ORDER — OXYCODONE HYDROCHLORIDE 5 MG/1
5 TABLET ORAL EVERY 4 HOURS PRN
Status: DISCONTINUED | OUTPATIENT
Start: 2020-03-02 | End: 2020-03-04 | Stop reason: HOSPADM

## 2020-03-02 RX ADMIN — BUTORPHANOL TARTRATE 1 MG: 1 INJECTION, SOLUTION INTRAMUSCULAR; INTRAVENOUS at 03:38

## 2020-03-02 RX ADMIN — ACETAMINOPHEN 650 MG: 325 TABLET, FILM COATED ORAL at 03:44

## 2020-03-02 RX ADMIN — ACETAMINOPHEN 650 MG: 325 TABLET, FILM COATED ORAL at 09:50

## 2020-03-02 RX ADMIN — OXYCODONE HYDROCHLORIDE 5 MG: 5 TABLET ORAL at 22:35

## 2020-03-02 RX ADMIN — ONDANSETRON 4 MG: 2 INJECTION INTRAMUSCULAR; INTRAVENOUS at 19:44

## 2020-03-02 RX ADMIN — OXYCODONE HYDROCHLORIDE 10 MG: 5 TABLET ORAL at 18:45

## 2020-03-02 RX ADMIN — Medication 400 MG: at 13:55

## 2020-03-02 RX ADMIN — CEFTRIAXONE SODIUM 1 G: 1 INJECTION, SOLUTION INTRAVENOUS at 13:56

## 2020-03-02 RX ADMIN — SODIUM CHLORIDE, PRESERVATIVE FREE 10 ML: 5 INJECTION INTRAVENOUS at 15:06

## 2020-03-02 RX ADMIN — BUTORPHANOL TARTRATE 1 MG: 1 INJECTION, SOLUTION INTRAMUSCULAR; INTRAVENOUS at 09:21

## 2020-03-02 RX ADMIN — SODIUM CHLORIDE, PRESERVATIVE FREE 10 ML: 5 INJECTION INTRAVENOUS at 09:18

## 2020-03-02 RX ADMIN — SODIUM CHLORIDE, POTASSIUM CHLORIDE, SODIUM LACTATE AND CALCIUM CHLORIDE 125 ML/HR: 600; 310; 30; 20 INJECTION, SOLUTION INTRAVENOUS at 18:24

## 2020-03-02 RX ADMIN — SODIUM CHLORIDE, POTASSIUM CHLORIDE, SODIUM LACTATE AND CALCIUM CHLORIDE 125 ML/HR: 600; 310; 30; 20 INJECTION, SOLUTION INTRAVENOUS at 08:48

## 2020-03-02 RX ADMIN — FAMOTIDINE 20 MG: 10 INJECTION INTRAVENOUS at 09:17

## 2020-03-02 RX ADMIN — SODIUM CHLORIDE, PRESERVATIVE FREE 10 ML: 5 INJECTION INTRAVENOUS at 15:55

## 2020-03-02 RX ADMIN — BUTORPHANOL TARTRATE 1 MG: 1 INJECTION, SOLUTION INTRAMUSCULAR; INTRAVENOUS at 19:43

## 2020-03-02 RX ADMIN — SODIUM CHLORIDE, POTASSIUM CHLORIDE, SODIUM LACTATE AND CALCIUM CHLORIDE 125 ML/HR: 600; 310; 30; 20 INJECTION, SOLUTION INTRAVENOUS at 00:54

## 2020-03-02 RX ADMIN — ACETAMINOPHEN 1000 MG: 500 TABLET, FILM COATED ORAL at 17:30

## 2020-03-02 RX ADMIN — ONDANSETRON 4 MG: 2 INJECTION INTRAMUSCULAR; INTRAVENOUS at 06:09

## 2020-03-02 NOTE — NON STRESS TEST
Fe Smith, a  at 33w0d with an CALEB of 2020, by Last Menstrual Period, was seen at [facility] ANTEPARTUM UNIT for a nonstress test.    Chief Complaint   Patient presents with   • Back Pain     Pt to L/D with c/o back pain that started around 0200.  Pt denies history of kidney stones.       Patient Active Problem List   Diagnosis   • Prenatal care, subsequent pregnancy in third trimester   • Other constipation   • Migraine without aura and without status migrainosus, not intractable   • Iron deficiency anemia during pregnancy   • Pregnancy   • Kidney stone complicating pregnancy, third trimester       Start Time: 905 Stop Time: 945  Interpretation A  Nonstress Test Interpretation A: Reactive (20 0945 : Vidhi Govea, RN)

## 2020-03-02 NOTE — PROGRESS NOTES
"Patient name: Fe Smith  Age: 24 y.o.  Sex: female  YOB: 1995   MRN: [id number]  Admission Date: 3/1/2020    Gestational age: 33w0d    Chief Complaint   Patient presents with   • Back Pain     Pt to L/D with c/o back pain that started around 0200.  Pt denies history of kidney stones.      Subjective:    Fe Smith is a 24 y.o.  at 33w0d who presented with kidney stone.  She reports she is still having \"severe\" pain when the pain medication wears off.  The pain is \"all over\" her back - not localized to one side. She has nausea from the pain medications. Usually takes phenergan at home.  Denies vaginal bleeding/LOF. Notes normal fetal movements. Denies fever/chills/abdominal pain.    Objective:   Temp:  [97.7 °F (36.5 °C)-98.7 °F (37.1 °C)] 98.7 °F (37.1 °C)  Heart Rate:  [] 88  Resp:  [16-18] 16  BP: ()/(45-68) 91/45    Exam:     Physical Examination: General appearance - alert, well appearing, and in no distress  Mental status - alert, oriented to person, place, and time  Eyes - sclera anicteric, left eye normal, right eye normal  Ears - right ear normal, left ear normal  Neck - normal ROM  Chest - clear to auscultation, no wheezes, rales or rhonchi, symmetric air entry  Heart - normal rate and regular rhythm  Abdomen - soft, nontender, gravid,   Pelvic - examination not indicated  Back exam - no bilateral CVA tenderness  Neurological - alert, oriented, normal speech, no focal findings or movement disorder noted  Musculoskeletal - no joint tenderness, deformity or swelling  Extremities - no pedal edema noted  Skin - normal coloration and turgor, no rashes, no suspicious skin lesions noted    Labs:  Lab Results   Component Value Date    WBC 14.36 (H) 2020    HGB 10.0 (L) 2020    HCT 29.3 (L) 2020    MCV 91.8 2020     2020       NST: See documentation        Assessment:      IUP at 33w0d   Nephrolithiasis  Urine culture, positive    Plan:  "   Nephrolithiasis:   Reviewed with patient that for discharge she must be comfortable without IV pain medication. Continue hydration and straining urine    Positive urine culture:   Urine culture positive for E.coli, sensitivities pending   She does not appear to have pyelonephritis based on clinical exam and she has remained afebrile. Will order IV Rocephin while inpatient.     IUP at 33w 0d   NST reactive    Joann Chamorro MD  3/2/2020  12:48 PM

## 2020-03-02 NOTE — PLAN OF CARE
Problem: Patient Care Overview  Goal: Plan of Care Review  Outcome: Ongoing (interventions implemented as appropriate)  Flowsheets  Taken 3/2/2020 0616  Progress: no change  Outcome Summary: Pt. 33 wk for kidney stone. Can get stadol 1mg q2 hours, taking it approx. q6 hours. pt. does not want to take it too often. Pt. wishes to go home today. NST reactive downstairs in LD before coming up to antepartum unit. Pt. was unable to do FKC bc of opioid and sleep. Reviewed plan of care with patient. No reports of LOF, VB, or CTX. she does report good fetal movement.  Taken 3/1/2020 2106  Plan of Care Reviewed With: patient

## 2020-03-02 NOTE — NON STRESS TEST
Fe Smith, a  at 32w6d with an CALEB of 2020, by Last Menstrual Period, was seen at [facility] ANTEPARTUM UNIT for a nonstress test.    Chief Complaint   Patient presents with   • Back Pain     Pt to L/D with c/o back pain that started around 0200.  Pt denies history of kidney stones.       Patient Active Problem List   Diagnosis   • Prenatal care, subsequent pregnancy in third trimester   • Other constipation   • Migraine without aura and without status migrainosus, not intractable   • Iron deficiency anemia during pregnancy   • Pregnancy   • Kidney stone complicating pregnancy, third trimester       Start Time:   Stop Time:    Interpretation A  Nonstress Test Interpretation A: Reactive (20 : Kelley Kendall RN)

## 2020-03-02 NOTE — PAYOR COMM NOTE
"[facility]   &  [facility] Stephanie [place]more  [address]    [address] [place] [address]    Stephanie [place] [address]    Rominajanet Ferraroeliseo  Utilization Review/Room Reservations  Phone: [phone], [phone] or [phone]  Fax: [phone]  Email: [email or web address]  Please call, fax back, or email with authorization or any questions! Thanks!    ANTEPARTUM AUTH REQUEST  ID#[id number]      This fax contains any of the following:  Face Sheet, H&P, progress notes, consults, orders, meds, lab results, labor record, vitals, delivery worksheet, op note, d/c summary.Anusha Riley (24 y.o. Female)     Date of Birth Social Security Number Address Home Phone MRN    1995  [address] [facility] [address] [phone] [id number]    [place] Marital Status          None Single       Admission Date Admission Type Admitting Provider Attending Provider Department, Room/Bed    3/1/20 Elective Flor Rothman MD Oliphant, Richard C, MD [facility] ANTEPARTUM UNIT, 3307/1    Discharge Date Discharge Disposition Discharge Destination                       Attending Provider:  Mekhi Kapoor MD    Allergies:  No Known Allergies    Isolation:  None   Infection:  None   Code Status:  CPR    Ht:  152.4 cm (60\")   Wt:  73.5 kg (162 lb)    Admission Cmt:  None   Principal Problem:  Kidney stone complicating pregnancy, third trimester [O26.833,N20.0]                 Active Insurance as of 3/1/2020     Primary Coverage     Payor Plan Insurance Group Employer/Plan Group    [employer]AN PAS[employer]ORT [id number]     Payor Plan Address Payor Plan Phone Number Payor Plan Fax Number Effective Dates    [address] [phone]  11/1/1997 - None Entered    [facility] [id number]       Subscriber Name Subscriber Birth Date Member ID       ANUSHA RILEY 1995 [id number]                 Emergency Contacts      (Rel.) Home Phone Work Phone " Mobile Phone    LUIGI POSADA (Mother) [phone] -- --               History & Physical      Flor Rothman MD at 03/01/20 1325              Patient Care Team:  Mekhi Kapoor MD as PCP - General (Obstetrics and Gynecology)    Chief complaint Back pain    Subjective     Patient is a 24 y.o. female presented at 32w6d with excruciating back pain.  Patient states the pain started suddenly this morning and is constant.  She does feel that there are waves where the pain intensifies and she has associated nausea and vomiting with the pain.  Patient denies any previous history of kidney stones.  She does report having a UTI in early pregnancy and completed antibiotics.  She denies any contraction-like pain, vaginal bleeding.  She does report good fetal movement.    Review of Systems   Pertinent items are noted in HPI, all other systems reviewed and negative    History  Past Medical History:   Diagnosis Date   • Abnormal Pap smear of cervix    • Chlamydia     3 years ago     Past Surgical History:   Procedure Laterality Date   • WISDOM TOOTH EXTRACTION       No family history on file.  Social History     Tobacco Use   • Smoking status: Never Smoker   Substance Use Topics   • Alcohol use: No   • Drug use: No     Medications Prior to Admission   Medication Sig Dispense Refill Last Dose   • magnesium oxide (MAG-OX) 400 MG tablet Take 1 tablet by mouth Daily. 30 tablet 0 Past Week at Unknown time   • promethazine (PHENERGAN) 12.5 MG tablet Take 1 tablet by mouth Every 6 (Six) Hours As Needed for Nausea or Vomiting. 30 tablet 0 3/1/2020 at 0200   • Prenatal Vit-Fe Fumarate-FA (PRENATAL, CLASSIC, VITAMIN) 28-0.8 MG tablet tablet Take 1 tablet by mouth Daily. 30 tablet 11 More than a month at Unknown time     Allergies:  Patient has no known allergies.    Objective     Vital Signs  Temp:  [98.4 °F (36.9 °C)] 98.4 °F (36.9 °C)  Heart Rate:  [] 88  Resp:  [22] 22  BP: (100-138)/(56-75) 100/56    Physical Exam:     General Appearance: alert, appears stated age and cooperative  Back: Tenderness to palpation along the entire back, no specific CVA tenderness  Lungs: clear to auscultation, respirations regular, respirations even and respirations unlabored  Heart: regular rhythm & normal rate  Abdomen: normal bowel sounds, soft non-tender, no guarding, no rebound tenderness and Gravid uterus consistent with dates  Pelvic: Cervix closed per nursing  Extremities: moves extremities well, no edema, no cyanosis and no redness    Results Review:    I reviewed the patient's new clinical results.    Assessment/Plan       Kidney stone complicating pregnancy, third trimester    Pregnancy    Patient's clinical picture is consistent with kidney stone.  She does get some pain relief with IV Stadol.  An ultrasound was performed and shows a 7 mm echogenic focus in the left kidney that may be a nonobstructive stone.  We will strain patient's urine.  Patient has no findings concerning for pyelonephritis.  Urine culture is pending.  Discussed with patient that we will observe her and IV hydrate.  If pain does not improve by tomorrow morning, we will consider consulting urology.  She is in agreement with plan of care.    I discussed the patients findings and my recommendations with patient.     Flor Rothman MD  03/01/20  1:28 PM    Time: More than 50% of time spent in counseling and coordination of care:  Total face-to-face/floor time 50 min.  Time spent in counseling 30 min. Counseling included the following topics: ultrasound findings and plan of care    Electronically signed by Flor Rothman MD at 03/01/20 1330          Physician Progress Notes (last 48 hours) (Notes from 02/29/20 1652 through 03/02/20 1652)      Joann Chamorro MD at 03/02/20 1248          Patient name: Fe Smith  Age: 24 y.o.  Sex: female  YOB: 1995   MRN: [id number]  Admission Date: 3/1/2020    Gestational age: 33w0d    Chief Complaint   Patient  "presents with   • Back Pain     Pt to L/D with c/o back pain that started around 0200.  Pt denies history of kidney stones.      Subjective:    Fe Smith is a 24 y.o.  at 33w0d who presented with kidney stone.  She reports she is still having \"severe\" pain when the pain medication wears off.  The pain is \"all over\" her back - not localized to one side. She has nausea from the pain medications. Usually takes phenergan at home.  Denies vaginal bleeding/LOF. Notes normal fetal movements. Denies fever/chills/abdominal pain.    Objective:   Temp:  [97.7 °F (36.5 °C)-98.7 °F (37.1 °C)] 98.7 °F (37.1 °C)  Heart Rate:  [] 88  Resp:  [16-18] 16  BP: ()/(45-68) 91/45    Exam:     Physical Examination: General appearance - alert, well appearing, and in no distress  Mental status - alert, oriented to person, place, and time  Eyes - sclera anicteric, left eye normal, right eye normal  Ears - right ear normal, left ear normal  Neck - normal ROM  Chest - clear to auscultation, no wheezes, rales or rhonchi, symmetric air entry  Heart - normal rate and regular rhythm  Abdomen - soft, nontender, gravid,   Pelvic - examination not indicated  Back exam - no bilateral CVA tenderness  Neurological - alert, oriented, normal speech, no focal findings or movement disorder noted  Musculoskeletal - no joint tenderness, deformity or swelling  Extremities - no pedal edema noted  Skin - normal coloration and turgor, no rashes, no suspicious skin lesions noted    Labs:  Lab Results   Component Value Date    WBC 14.36 (H) 2020    HGB 10.0 (L) 2020    HCT 29.3 (L) 2020    MCV 91.8 2020     2020       NST: See documentation        Assessment:      IUP at 33w0d   Nephrolithiasis  Urine culture, positive    Plan:    Nephrolithiasis:   Reviewed with patient that for discharge she must be comfortable without IV pain medication. Continue hydration and straining urine    Positive urine " "culture:   Urine culture positive for E.coli, sensitivities pending   She does not appear to have pyelonephritis based on clinical exam and she has remained afebrile. Will order IV Rocephin while inpatient.     IUP at 33w 0d   NST reactive    Joann Chamorro MD  3/2/2020  12:48 PM        Electronically signed by Joann Chamorro MD at 03/02/20 1305          Consult Notes (last 48 hours) (Notes from 02/29/20 1652 through 03/02/20 1652)      Adithya Mane at 03/02/20 1033      Consult Orders    1. Inpatient Access Center Consult [[id number]] ordered by Flor Rothman MD at 03/01/20 3849              23 y/o cauc pregnant female admitted to antepartum yesterday.  She had a high EPDS and  Patient is 33 weeks pregnant.  She came to the ED w/ c/o's of \"extreme back pain\" and was found to have a kidney stone.  Patient states that she had some depression and was given a prescription prior to the hospitalization but had not been able to get it filled yet.  She states she has someone to see. She states that she is not interested in any additional evaluation and at this time does not wish to have an Access Center eval. Patient was willing to address questions re: self harm.  No hx of current or past suicidal ideation or suicide attempts. She denied need for referral for outpatient patient resources.  Patient was polite and cooperative.  Discussed w/ RN.  Pt and RN were informed that if she changes her mind and request Access Center return that a new order for Access Center eval can be made. Chart indicates that he hopes she can go home today. Per RN that has not yet been determined.      Electronically signed by Adithya Mane at 03/02/20 1046         Maternal Vitals (last 2 days)     Date/Time   Temp   Pulse   Resp   BP   SpO2   Weight    03/02/20 1557   98.8 (37.1)   100   16   119/73   96   --    03/02/20 1253   --   87   --   --   99   --    03/02/20 1251   --   107   --   --   93   --    03/02/20 1223   --   83   --   " --   93   --    03/02/20 1222   --   82   --   --   96   --    03/02/20 1215   98.7 (37.1)   88   16   91/45   94   --    03/02/20 1207   --   78   --   --   97   --    03/02/20 1152   --   73   --   --   97   --    03/02/20 1120   --   82   --   --   97   --    03/02/20 1105   --   85   --   --   96   --    03/02/20 1050   --   78   --   --   95   --    03/02/20 1035   --   81   --   --   97   --    03/02/20 1020   --   78   --   --   97   --    03/02/20 1005   --   79   --   --   96   --    03/02/20 0951   --   62   --   --   94 RN to room and pulse ox trouble shooted   --    SpO2: RN to room and pulse ox trouble shooted at 03/02/20 0951    03/02/20 0943   --   82   --   --   94   --    03/02/20 0942   --   77   --   --   95   --    03/02/20 0936   --   80   --   --   94   --    03/02/20 0931   --   90   --   --   94   --    03/02/20 0927   --   92   --   --   98   --    03/02/20 0912   --   91   --   --   96   --    03/02/20 0857   --   80   --   --   96   --    03/02/20 0818   --   70   --   --   94   --    03/02/20 0812   --   85   --   --   98   --    03/02/20 0757   --   86   --   --   100   --    03/02/20 0751   98.4 (36.9)   87   16   99/54   98   --    03/02/20 0742   --   86   --   --   97   --    03/02/20 0735   --   90   --   --   92   --    03/02/20 0727   --   88   --   --   97   --    03/02/20 0712   --   86   --   --   97   --    03/02/20 0657   --   86   --   --   97   --    03/02/20 0642   --   83   --   --   98   --    03/02/20 0636   --   70   --   --   91   --    03/02/20 0629   --   70   --   --   92   --    03/02/20 0608   --   92   --   --   100   --    03/02/20 0607   --   63   --   --   94   --    03/02/20 0553   --   81   --   --   100   --    03/02/20 0533   --   81   --   --   100   --    03/02/20 0518   --   77   --   --   96   --    03/02/20 0503   --   82   --   --   96   --    03/02/20 0448   --   79   --   --   95   --    03/02/20 0433   --   82   --   --   95   --    03/02/20 0418    --   78   --   --   95   --    03/02/20 0415   --   63   --   --   92   --    Comment rows:    OBSERV: pt. sleeping at 03/02/20 0415    03/02/20 0414   --   63   --   --   92   --    03/02/20 0403   --   98   --   --   100   --    03/02/20 0348   97.7 (36.5)   91   --   117/68   96   --    03/02/20 0344   --   --   --   --   --   --    Comment rows:    OBSERV: pt. vomiting from stadol at 03/02/20 0344    03/02/20 0333   --   111   --   --   100   --    03/02/20 0318   --   88   --   --   100   --    03/02/20 0305   --   54   --   --   93   --    03/02/20 0255   --   83   --   --   97   --    03/02/20 0240   --   85   --   --   98   --    03/02/20 0224   --   92   --   --   97   --    03/02/20 0209   --   88   --   --   97   --    03/02/20 0154   --   86   --   --   97   --    03/02/20 0139   --   88   --   --   96   --    03/02/20 0124   --   80   --   --   97   --    03/02/20 0109   --   82   --   --   97   --    03/02/20 0054   --   82   --   --   97   --    03/02/20 0039   --   80   --   --   97   --    03/02/20 0028   --   78   --   --   91   --    03/02/20 0014   --   86   --   --   94   --    03/02/20 0013   --   72   --   --   96   --    03/02/20 0002   --   84   --   --   94   --    03/01/20 2348   --   80   --   --   96   --    03/01/20 2333   --   77   --   --   96   --    03/01/20 2318   --   82   --   --   96   --    03/01/20 2304   --   89   --   --   91   --    03/01/20 2303   --   73   --   --   97   --    03/01/20 2248   --   77   --   --   97   --    03/01/20 2236   --   63   --   --   93   --    03/01/20 2219   --   84   --   --   92   --    03/01/20 2213   --   80   --   --   93   --    03/01/20 2207   --   83   --   --   97   --    03/01/20 2152   --   79   --   --   97   --    03/01/20 2137   --   88   --   --   99   --    03/01/20 2126   --   91   --   --   93   --    03/01/20 2116   --   95   --   --   94   --    03/01/20 2105   98.6 (52)   89   18   119/62   100   --    03/01/20 2003   97.8  (36.6)   94   16   131/66   100   --    03/01/20 1729   --   91   --   --   90   --    03/01/20 1728   --   88   --   --   100   --    03/01/20 1723   --   85   --   --   99   --    03/01/20 1718   --   83   --   --   98   --    03/01/20 1713   --   85   --   --   98   --    03/01/20 1708   --   85   --   --   98   --    03/01/20 1703   --   85   --   --   98   --    03/01/20 1658   --   89   --   --   98   --    03/01/20 1653   --   85   --   --   98   --    03/01/20 1648   --   88   --   --   98   --    03/01/20 1643   --   84   --   --   98   --    03/01/20 1638   --   89   --   --   98   --    03/01/20 1633   --   86   --   --   98   --    03/01/20 1628   --   88   --   --   98   --    03/01/20 1623   --   87   --   --   98   --    03/01/20 1614   --   90   --   --   99   --    03/01/20 1609   --   101   --   --   99   --    03/01/20 1604   --   94   --   --   100   --    03/01/20 1559   --   95   --   --   100   --    03/01/20 1543   --   96   --   --   100   --    03/01/20 1538   --   99   --   --   100   --    03/01/20 1533   --   98   --   --   100   --    03/01/20 1528   --   88   --   --   100   --    03/01/20 1523   --   91   --   --   100   --    03/01/20 1518   --   95   --   --   100   --    03/01/20 1513   --   115   --   --   94   --    03/01/20 1509   --   95   --   --   95   --    03/01/20 1506   --   99   --   --   93   --    03/01/20 1504   --   94   --   --   100   --    03/01/20 1500   --   86   --   --   92   --    03/01/20 1459   --   98   --   --   100   --    03/01/20 1454   --   97   --   --   100   --    03/01/20 1449   --   101   --   --   93   --    03/01/20 1438   --   93   --   --   100   --    03/01/20 1433   --   90   --   --   100   --    03/01/20 1428   --   86   --   --   98   --    03/01/20 1423   --   86   --   --   100   --    03/01/20 1418   --   82   --   --   100   --    03/01/20 1414   98.5 (36.9)   81   16   106/48   --   --    03/01/20 1413   --   88   --   --   100   --     03/01/20 1408   --   84   --   --   99   --    03/01/20 1403   --   94   --   --   99   --    03/01/20 1358   --   86   --   --   99   --    03/01/20 1353   --   85   --   --   99   --    03/01/20 1348   --   81   --   --   98   --    03/01/20 1343   --   84   --   --   99   --    03/01/20 1338   --   83   --   --   99   --    03/01/20 1333   --   79   --   --   100   --    03/01/20 1328   --   88   --   --   99   --    03/01/20 1324   --   95   --   --   94   --    03/01/20 1323   --   99   --   --   100   --    03/01/20 1311   --   89   --   --   97   --    03/01/20 1306   --   89   --   --   97   --    03/01/20 1301   --   94   --   --   98   --    03/01/20 1256   --   89   --   --   98   --    03/01/20 1251   --   92   --   --   98   --    03/01/20 1246   --   88   --   --   98   --    03/01/20 1241   --   88   --   --   98   --    03/01/20 1236   --   89   --   --   98   --    03/01/20 0927   --   --   --   --   --   --    Comment rows:    OBSERV: Dr. Rothman at bedside reviewing plan of care. at 03/01/20 0927    03/01/20 0834   --   90   --   100/56   --   --    03/01/20 0637   --   120   --   138/75   --   --    03/01/20 0634   98.4 (36.9)   120   22   138/75   --   73.5 (162)            FHR (last 2 days)     Date/Time Fetal HR Assessment Method Fetal HR (beats/min) Fetal Heart Baseline Rate Fetal HR Variability Fetal HR Accelerations Fetal HR Decelerations Fetal HR Tracing Category    03/02/20 0945  external  130  normal range  moderate (amplitude range 6 to 25 bpm)  greater than/equal to 15 bpm;lasting at least 15 seconds  absent  --    03/02/20 0930  external  140 baseline changed to 135 at 0921  normal range  moderate (amplitude range 6 to 25 bpm)  greater than/equal to 15 bpm;lasting at least 15 seconds  variable  --    03/01/20 2040  external  135  normal range  moderate (amplitude range 6 to 25 bpm)  greater than/equal to 15 bpm;lasting at least 15 seconds  absent  --    03/01/20 0916  external  125   normal range  moderate (amplitude range 6 to 25 bpm)  greater than/equal to 15 bpm;lasting at least 15 seconds  absent  --    03/01/20 0900  external  135  normal range  moderate (amplitude range 6 to 25 bpm)  greater than/equal to 15 bpm;lasting at least 15 seconds  absent  --    03/01/20 0830  external  135  normal range  moderate (amplitude range 6 to 25 bpm)  absent  absent  --    03/01/20 0800  external  135  normal range  moderate (amplitude range 6 to 25 bpm)  greater than/equal to 15 bpm;lasting at least 15 seconds  absent  --    03/01/20 0730  external  135  normal range  moderate (amplitude range 6 to 25 bpm)  greater than/equal to 15 bpm;lasting at least 15 seconds  absent  --    03/01/20 0700  external  150  normal range  moderate (amplitude range 6 to 25 bpm)  greater than/equal to 15 bpm;lasting at least 15 seconds  absent  --        Uterine Activity (last 2 days)     Date/Time Method Contraction Frequency (Minutes) Contraction Duration (sec) Contraction Intensity Uterine Resting Tone Contraction Pattern    03/02/20 0945  palpation;per patient report;external tocotransducer  --  --  no contractions  soft by palpation  --    03/02/20 0930  palpation;per patient report;external tocotransducer  --  --  no contractions  soft by palpation  Irritability    03/02/20 0751  palpation;per patient report  --  --  no contractions  --  --    03/01/20 2040  external tocotransducer  0  --  --  --  --    03/01/20 0916  external tocotransducer;palpation  --  --  no contractions  soft by palpation  --    03/01/20 0900  palpation;external tocotransducer  --  --  no contractions  soft by palpation  --    03/01/20 0830  palpation;external tocotransducer  --  --  no contractions  soft by palpation  --    03/01/20 0800  external tocotransducer  --  --  no contractions  soft by palpation  --    03/01/20 0730  palpation;external tocotransducer  --  --  no contractions  soft by palpation  --    03/01/20 0700  external  tocotransducer  --  --  no contractions  soft by palpation  --        Labor Pain (last 2 days)     Date/Time Pain Rating (0-10): Rest Pain Rating (0-10): Activity Pain Management Interventions    03/02/20 1557  5  5  medication offered but refused;pain management plan reviewed with patient/caregiver    03/02/20 1500  0  0  --    03/02/20 1355  5  5  see MAR mag ox given for c/o HA    03/02/20 1315  5  5  other (see comments) pt wanting MD called for magnesium/mag ox for HA    03/02/20 1215  6  6  medication offered but refused;pain management plan reviewed with patient/caregiver    03/02/20 0950  2  2  --    03/02/20 0921  7  7  see MAR    03/02/20 0751  2  2  medication offered but refused;pain management plan reviewed with patient/caregiver    03/02/20 0415  3  --  see MAR    03/02/20 0338  7  7  --    03/01/20 2135  3  --  see MAR    03/01/20 2105  7  7  see MAR    03/01/20 2003  6  6  medication offered but refused    03/01/20 1609  7  7  --    03/01/20 1518  5  --  --    03/01/20 1414  2  2  --    03/01/20 1244  0  0  --    03/01/20 1224  5  5  --    03/01/20 0955  0  0  --    03/01/20 0905  6  6  --    03/01/20 0715  0  0  --    03/01/20 0711  0  --  --    03/01/20 0705  8  --  --    03/01/20 0634  8  --  --          Initiate Observation Status [ADT12] (Order [id number])   Order   Date: 3/1/2020 Department: [facility] ANTEPARTUM UNIT Released By: Melania Hagan RN (auto-released) Authorizing: Joann Chamorro MD   Order History   Inpatient   Date/Time Action Taken User Additional Information   03/01/20 0616 Release Melania Hagan RN (auto-released) From Order: [id number]   03/01/20 0616 Complete Melania Hagan RN    Order Details     Frequency Duration Priority Order Class   Once 1  occurrence Routine Hospital Performed   Start Date/Time     Start Date Start Time   03/01/20 0616   Order Information     Order Date Service Start Date Start Time End Date   03/01/20 Obstetrics 03/01/20 0616 03/01/20    Comments     No chief complaint on file.            Reference Links     Associated Reports   View Encounter   Order Questions     Question Answer Comment   Level of Care Labor & Delivery    Diagnosis Pregnancy    Admitting Physician LAWRENCE CHAMORRO           Verbal Order Info     Action Created on Order Mode Entered by Responsible Provider Signed by Signed on   Ordering 03/01/20 0616 Per standing order: cosign required Melania Hagan RN      Cosign Order Info     Action Created on Responsible Provider Signed by Signed on   Ordering 03/01/20 0616 Lawrence Chamorro MD Keller, Cara C, MD 03/02/20 0927   Completion Info     User Date/Time   Melania Hagan RN 03/01/20 0616   Acknowledgement Info     For At Acknowledged By Acknowledged On   Placing Order 03/01/20 0616 Florina Vo RN 03/01/20 0630   Reprint Order Requisition     Initiate Observation Status (Order #[id number]) on 3/1/20   Encounter-Level Cardiology Documents:     There are no encounter-level cardiology documents.   Encounter     View Encounter          Order Provider Info         Office phone Pager E-mail   Ordering User Melania Hagan RN -- -- --   Authorizing Provider Lawrence Chamorro [phone] -- --   Attending Provider Mekhi Kapoor [phone] -- --   Linked Charges     No charges linked to this procedure   Tracking Reports      Cosign Tracking Order Transmittal Tracking   Authorized by:  Lawrence Chamorro MD  (NPI: [id number])       Lab Component SmartPhrase Guide     Initiate Observation Status (Order #[id number]) on 3/1/20       Admit To Obstetrics Inpatient [ADT13] (Order [id number])   Order   Date: 3/1/2020 Department: [facility] ANTEPARTUM UNIT Released By: Claudia Paez RN (auto-released) Authorizing: Flor Rothman MD   Order History   Inpatient   Date/Time Action Taken User Additional Information   03/01/20 1327 Release Claudia Paez RN (auto-released) From Order: [id number]   03/01/20 1327 Complete  "Claudia Paez RN    Order Details     Frequency Duration Priority Order Class   Once 1  occurrence Routine Hospital Performed   Start Date/Time     Start Date Start Time   03/01/20 1325   Order Information     Order Date Service Start Date Start Time End Date   03/01/20 Obstetrics 03/01/20 1325 03/01/20   Reference Links     Associated Reports   View Encounter   Order Questions     Question Answer Comment   Diagnosis Pregnancy    Obstetrics Service Antepartum    Admitting Physician FLOR ROGER           Process Instructions     Use this order when admitting a patient as inpatient to an obstetrics area.     Use the \"Initiate observation status\" order if placing a patient in observation.    Verbal Order Info     Action Created on Order Mode Entered by Responsible Provider Signed by Signed on   Ordering 03/01/20 1327 Verbal with readback Claudia Paez RN Slone, Laura Jane, MD Slone, Laura Jane, MD 03/01/20 1330   Completion Info     User Date/Time   Claudia Paez RN 03/01/20 1327   Acknowledgement Info     For At Acknowledged By Acknowledged On   Placing Order 03/01/20 1327 Claudia Paez RN 03/01/20 1328   Reprint Order Requisition     Admit To Obstetrics Inpatient (Order #[id number]) on 3/1/20   Encounter-Level Cardiology Documents:     There are no encounter-level cardiology documents.   Encounter     View Encounter          Order Provider Info         Office phone Pager E-mail   Ordering User Claudia Paez RN -- -- --   Authorizing Provider Flor Roger [phone] -- --   Attending Provider Mekhi Kapoor [phone] -- --   Linked Charges     No charges linked to this procedure   Tracking Reports      Cosign Tracking Order Transmittal Tracking   Authorized by:  Flor Roger MD  (NPI: [id number])       Lab Component SmartPhrase Guide     Admit To Obstetrics Inpatient (Order #[id number]) on 3/1/20       "

## 2020-03-02 NOTE — PLAN OF CARE
Problem: Patient Care Overview  Goal: Plan of Care Review  Flowsheets (Taken 3/2/2020 1705)  Progress: no change  Plan of Care Reviewed With: patient; significant other  Outcome Summary: VSS and patient afebrile.  Urine culture grew E. Coli so IV antibiotic started today.  RNST and active FM reported.  Patient pain controlled with Tylenol and one dose of Stadol today.  Voiding without difficulty or pain.  Urine strained with no stones noted.  Patient denies cramping or ctx, vaginal bleeding or LOF.  Patient hoping to go home tomorrow.     Problem: Patient Care Overview  Goal: Individualization and Mutuality  Flowsheets  Taken 3/2/2020 0616 by Gracie Gabriel, RN  Patient Specific Goals (Include Timeframe): pain at a tolerable level  How to Address Anxieties/Fears: educate  Patient Specific Interventions: can have pain meds q2 hours prn, tylenol ordered and zofran  What Anxieties, Fears, Concerns, or Questions Do You Have About Your Care?: staying hospitalized for long time  Taken 3/1/2020 1723 by Claudia Paez RN  What Information Would Help Us Give You More Personalized Care?: keep informed of plan of care  How Would You and/or Your Support Person Like to Participate in Your Care?: be as involved in decision making as possible  Taken 3/2/2020 1705 by Vidhi Govea RN  Patient Specific Preferences: Wants to go home tomorrow     Problem: Patient Care Overview  Goal: Discharge Needs Assessment  Flowsheets  Taken 3/1/2020 1723 by Claudia Paez RN  Equipment Needed After Discharge: none  Anticipated Changes Related to Illness: none  Transportation Concerns: car, none  Readmission Within the Last 30 Days: no previous admission in last 30 days  Offered/Gave Vendor List: no  Taken 3/1/2020 1329 by Claudia Paez RN  Equipment Currently Used at Home: none  Taken 3/2/2020 0616 by Gracie Gabriel RN  Transportation Anticipated: family or friend will provide  Concerns to be Addressed: adjustment to  diagnosis/illness  Taken 3/1/2020 1328 by Claudia Paez, RN  Patient/Family Anticipated Services at Transition: none  Patient/Family Anticipates Transition to: home with family     Problem: Patient Care Overview  Goal: Interprofessional Rounds/Family Conf  Flowsheets (Taken 3/1/2020 1723 by Claudia Paez, RN)  Participants: family;nursing;physician;patient     Problem: Prenatal Patient, Hospitalized (Adult,Obstetrics,Pediatric)  Goal: Signs and Symptoms of Listed Potential Problems Will be Absent, Minimized or Managed (Prenatal Patient, Hospitalized)  Flowsheets  Taken 3/1/2020 1723 by Claudia Paez, RN  Problems Assessed (Prenatal Patient): all  Taken 3/2/2020 1705 by Vidhi Govea RN  Problems Present (Prenatal Patient): infection     Problem: Pain, Acute (Adult)  Goal: Identify Related Risk Factors and Signs and Symptoms  Flowsheets (Taken 3/2/2020 0616 by Gracie Gabriel, RN)  Related Risk Factors (Acute Pain): persistent pain  Signs and Symptoms (Acute Pain): guarding/abnormal posturing/positioning;verbalization of pain descriptors     Problem: Pain, Acute (Adult)  Goal: Acceptable Pain Control/Comfort Level  Flowsheets (Taken 3/2/2020 1705)  Acceptable Pain Control/Comfort Level: making progress toward outcome

## 2020-03-03 LAB — BACTERIA SPEC AEROBE CULT: ABNORMAL

## 2020-03-03 PROCEDURE — 25010000002 BUTORPHANOL PER 1 MG: Performed by: OBSTETRICS & GYNECOLOGY

## 2020-03-03 PROCEDURE — 59025 FETAL NON-STRESS TEST: CPT | Performed by: OBSTETRICS & GYNECOLOGY

## 2020-03-03 PROCEDURE — 25010000002 CEFTRIAXONE PER 250 MG: Performed by: OBSTETRICS & GYNECOLOGY

## 2020-03-03 PROCEDURE — 25010000002 ONDANSETRON PER 1 MG: Performed by: OBSTETRICS & GYNECOLOGY

## 2020-03-03 PROCEDURE — 59025 FETAL NON-STRESS TEST: CPT

## 2020-03-03 PROCEDURE — 99232 SBSQ HOSP IP/OBS MODERATE 35: CPT | Performed by: OBSTETRICS & GYNECOLOGY

## 2020-03-03 RX ORDER — CEFTRIAXONE SODIUM 1 G/50ML
1 INJECTION, SOLUTION INTRAVENOUS EVERY 24 HOURS
Status: DISCONTINUED | OUTPATIENT
Start: 2020-03-03 | End: 2020-03-04

## 2020-03-03 RX ADMIN — SODIUM CHLORIDE, POTASSIUM CHLORIDE, SODIUM LACTATE AND CALCIUM CHLORIDE 125 ML/HR: 600; 310; 30; 20 INJECTION, SOLUTION INTRAVENOUS at 20:40

## 2020-03-03 RX ADMIN — SODIUM CHLORIDE, POTASSIUM CHLORIDE, SODIUM LACTATE AND CALCIUM CHLORIDE 125 ML/HR: 600; 310; 30; 20 INJECTION, SOLUTION INTRAVENOUS at 02:26

## 2020-03-03 RX ADMIN — Medication 400 MG: at 13:32

## 2020-03-03 RX ADMIN — ACETAMINOPHEN 1000 MG: 500 TABLET, FILM COATED ORAL at 11:45

## 2020-03-03 RX ADMIN — CEFTRIAXONE SODIUM 1 G: 1 INJECTION, SOLUTION INTRAVENOUS at 15:57

## 2020-03-03 RX ADMIN — SODIUM CHLORIDE, POTASSIUM CHLORIDE, SODIUM LACTATE AND CALCIUM CHLORIDE 125 ML/HR: 600; 310; 30; 20 INJECTION, SOLUTION INTRAVENOUS at 10:43

## 2020-03-03 RX ADMIN — ONDANSETRON 4 MG: 2 INJECTION INTRAMUSCULAR; INTRAVENOUS at 05:08

## 2020-03-03 RX ADMIN — ACETAMINOPHEN 1000 MG: 500 TABLET, FILM COATED ORAL at 20:41

## 2020-03-03 RX ADMIN — OXYCODONE HYDROCHLORIDE 10 MG: 5 TABLET ORAL at 04:05

## 2020-03-03 RX ADMIN — BUTORPHANOL TARTRATE 1 MG: 1 INJECTION, SOLUTION INTRAMUSCULAR; INTRAVENOUS at 05:08

## 2020-03-03 NOTE — CONSULTS
FIRST UROLOGY CONSULT      Patient Identification:  NAME:  Fe Smith  Age:  24 y.o.   Sex:  female   :  1995   MRN:  [id number]       Chief complaint: back pain    History of present illness:  23yo lady in the third trimester with back pain      Past medical history:  Past Medical History:   Diagnosis Date   • Abnormal Pap smear of cervix    • Chlamydia     3 years ago       Past surgical history:  Past Surgical History:   Procedure Laterality Date   • WISDOM TOOTH EXTRACTION         Allergies:  Patient has no known allergies.    Home medications:  Medications Prior to Admission   Medication Sig Dispense Refill Last Dose   • magnesium oxide (MAG-OX) 400 MG tablet Take 1 tablet by mouth Daily. 30 tablet 0 Past Week at Unknown time   • promethazine (PHENERGAN) 12.5 MG tablet Take 1 tablet by mouth Every 6 (Six) Hours As Needed for Nausea or Vomiting. 30 tablet 0 3/1/2020 at 0200   • Prenatal Vit-Fe Fumarate-FA (PRENATAL, CLASSIC, VITAMIN) 28-0.8 MG tablet tablet Take 1 tablet by mouth Daily. 30 tablet 11 More than a month at Unknown time       Hospital medications:    magnesium oxide 400 mg Oral Daily   sodium chloride 10 mL Intravenous Q12H       lactated ringers 125 mL/hr Last Rate: 125 mL/hr (20 1043)     •  acetaminophen  •  butorphanol  •  ondansetron  •  oxyCODONE **OR** oxyCODONE  •  sodium chloride    Family history:  No family history on file.    Social history:  Social History     Tobacco Use   • Smoking status: Never Smoker   Substance Use Topics   • Alcohol use: No   • Drug use: No       Review of systems:    Negative 12-system ROS except for the following:  Back pain      Objective:  TMax 24 hours:   Temp (24hrs), Av.2 °F (36.8 °C), Min:97.7 °F (36.5 °C), Max:98.8 °F (37.1 °C)      Vitals Ranges:   Temp:  [97.7 °F (36.5 °C)-98.8 °F (37.1 °C)] 98.1 °F (36.7 °C)  Heart Rate:  [] 93  Resp:  [16-18] 18  BP: (107-125)/(60-73) 125/60    Intake/Output Last 3 shifts:  I/O  last 3 completed shifts:  In: 6334 [I.V.:6334]  Out: 5200 [Urine:5200]     Physical Exam:       General Appearance:    Alert, cooperative, in no acute distress   Head:    Normocephalic, without obvious abnormality, atraumatic   Eyes:          PERRL, conjunctivae and corneas clear   Ears:    Normal external inspection   Throat:   No oral lesions, oral mucosa moist   Neck:   Supple, no LAD, trachea midline   Back:     No CVA tenderness   Lungs:     Respirations unlabored, symmetric excursion    Heart:    RRR, intact peripheral pulses   Abdomen:       :       Extremities:   No edema, no deformity   Skin:   No bleeding, bruising or rashes   Neuro/Psych:   Orientation intact, mood/affect pleasant, no focal findings       Results review:   I reviewed the patient's new clinical results.    Data review:  Lab Results (last 24 hours)     Procedure Component Value Units Date/Time    Urine Culture - Urine, Urine, Catheter In/Out [[id number]]  (Abnormal)  (Susceptibility) Collected:  03/01/20 0708    Specimen:  Urine, Catheter In/Out Updated:  03/03/20 0250     Urine Culture >100,000 CFU/mL Escherichia coli    Susceptibility      Escherichia coli     MICHAEL     Ampicillin Resistant     Ampicillin + Sulbactam Intermediate     Cefazolin Susceptible     Cefepime Susceptible     Ceftazidime Susceptible     Ceftriaxone Susceptible     Gentamicin Susceptible     Levofloxacin Susceptible     Nitrofurantoin Susceptible     Piperacillin + Tazobactam Susceptible     Tetracycline Susceptible     Trimethoprim + Sulfamethoxazole Susceptible                           Imaging:  Imaging Results (Last 24 Hours)     ** No results found for the last 24 hours. **             Assessment:       Kidney stone complicating pregnancy, third trimester    Pregnancy  non obstructing stone in the left kidney  Physiologic minimal hydronephrosis  E coli uti    Plan:     Treat with antibiotics and observe  Do not recommend a ureteral stent in the current  scenario    Ryan Adams Jr., MD  03/03/20  1:06 PM

## 2020-03-03 NOTE — PLAN OF CARE
Problem: Patient Care Overview  Goal: Plan of Care Review  Outcome: Ongoing (interventions implemented as appropriate)  Flowsheets  Taken 3/3/2020 0607 by Alexandra Stephenson, RN  Progress: no change  Taken 3/3/2020 0830 by Amanda Ruiz RN  Plan of Care Reviewed With: patient  Taken 3/3/2020 1805 by Amanda Ruiz RN  Outcome Summary: Reactive NST, +FM, denies any vaginal bleeding or leaking of fluid, or contractions at this time. Urine being strained and patient on iv fluids- LR at 125 cc/hr.

## 2020-03-03 NOTE — PROGRESS NOTES
LOS: 2 days   Patient Care Team:  Mekhi Kapoor MD as PCP - General (Obstetrics and Gynecology)    Chief Complaint:  Back pain    Subjective     Interval History:     Patient continues to complain of back pain.  States most of it is her lower back.  She also complains of pelvic pressure when up moving.  Reports good fetal movement.  Denies contractions, leaking, or bleeding.    Review of Systems:    All systems were reviewed and negative except for:  Musculoskeletal: positive for  back pain    Objective     Vital Signs  Temp:  [97.7 °F (36.5 °C)-98.8 °F (37.1 °C)] 98.1 °F (36.7 °C)  Heart Rate:  [] 78  Resp:  [16-18] 18  BP: ()/(45-73) 125/60    Physical Exam:   General Appearance: alert, appears stated age and cooperative  Back: no tenderness to percussion or palpation  Lungs: clear to auscultation, respirations regular, respirations even and respirations unlabored  Heart: regular rhythm & normal rate  Abdomen: soft non-tender, no guarding, no rebound tenderness and gravid uterus c/w dates  Extremities: moves extremities well, no edema, no cyanosis and no redness     Results Review:     I reviewed the patient's new clinical results.      Assessment/Plan       Kidney stone complicating pregnancy, third trimester    Pregnancy    25 yo  at 33w1d with kidney stone and UTI    Kidney stone--patient has not seen much improvement in her pain since 3/1.  Urine is being strained.  She continues to use IV stadol for pain.  Will consult urology since she feels she is not improving.    UTI--urine culture positive for e.coli.  No CVA tenderness.  Continue with rocephin.    IUP--NST bid.  Fetal monitoring is reassuring.      Flor Rothman MD  20  9:22 AM      Time: More than 50% of time spent in counseling and coordination of care:  Total face-to-face/floor time 25 min.  Time spent in counseling 15 min. Counseling included the following topics: plan of care

## 2020-03-03 NOTE — PLAN OF CARE
Problem: Patient Care Overview  Goal: Plan of Care Review  3/3/2020 0607 by Alexandra Stephenson RN  Outcome: Ongoing (interventions implemented as appropriate)  Flowsheets (Taken 3/3/2020 0607)  Progress: no change  Plan of Care Reviewed With: patient  Outcome Summary: RNST; +FM. Denies VB, LOF, ctx.  Meds given as scheduled. Pain increased.  Pain meds given. Urine strained.

## 2020-03-03 NOTE — NON STRESS TEST
Fe Smith, a  at 33w1d with an CALEB of 2020, by Last Menstrual Period, was seen at [facility] for a nonstress test.    Chief Complaint   Patient presents with   • Back Pain     Pt to L/D with c/o back pain that started around 0200.  Pt denies history of kidney stones.       Patient Active Problem List   Diagnosis   • Prenatal care, subsequent pregnancy in third trimester   • Other constipation   • Migraine without aura and without status migrainosus, not intractable   • Iron deficiency anemia during pregnancy   • Pregnancy   • Kidney stone complicating pregnancy, third trimester       Start Time: 842  Stop Time: 926

## 2020-03-03 NOTE — NON STRESS TEST
Fe Smith, a  at 33w0d with an CALEB of 2020, by Last Menstrual Period, was seen at [facility] for a nonstress test.    Chief Complaint   Patient presents with   • Back Pain     Pt to L/D with c/o back pain that started around 0200.  Pt denies history of kidney stones.       Patient Active Problem List   Diagnosis   • Prenatal care, subsequent pregnancy in third trimester   • Other constipation   • Migraine without aura and without status migrainosus, not intractable   • Iron deficiency anemia during pregnancy   • Pregnancy   • Kidney stone complicating pregnancy, third trimester       Start Time:   Stop Time:     Interpretation A  Nonstress Test Interpretation A: Reactive (20 : Alexandra Stephenson, RN)

## 2020-03-04 VITALS
WEIGHT: 162 LBS | RESPIRATION RATE: 20 BRPM | OXYGEN SATURATION: 99 % | BODY MASS INDEX: 31.8 KG/M2 | HEART RATE: 90 BPM | HEIGHT: 60 IN | TEMPERATURE: 98.6 F | DIASTOLIC BLOOD PRESSURE: 74 MMHG | SYSTOLIC BLOOD PRESSURE: 122 MMHG

## 2020-03-04 PROCEDURE — 59025 FETAL NON-STRESS TEST: CPT

## 2020-03-04 PROCEDURE — 59025 FETAL NON-STRESS TEST: CPT | Performed by: OBSTETRICS & GYNECOLOGY

## 2020-03-04 PROCEDURE — 99239 HOSP IP/OBS DSCHRG MGMT >30: CPT | Performed by: OBSTETRICS & GYNECOLOGY

## 2020-03-04 RX ORDER — CEPHALEXIN 500 MG/1
500 CAPSULE ORAL EVERY 8 HOURS SCHEDULED
Qty: 15 CAPSULE | Refills: 0 | Status: SHIPPED | OUTPATIENT
Start: 2020-03-04 | End: 2020-03-09

## 2020-03-04 RX ORDER — CEPHALEXIN 500 MG/1
500 CAPSULE ORAL EVERY 8 HOURS SCHEDULED
Status: DISCONTINUED | OUTPATIENT
Start: 2020-03-04 | End: 2020-03-04 | Stop reason: HOSPADM

## 2020-03-04 RX ADMIN — SODIUM CHLORIDE, POTASSIUM CHLORIDE, SODIUM LACTATE AND CALCIUM CHLORIDE 125 ML/HR: 600; 310; 30; 20 INJECTION, SOLUTION INTRAVENOUS at 04:38

## 2020-03-04 NOTE — NON STRESS TEST
Fe Smith, a  at 33w2d with an CALEB of 2020, by Last Menstrual Period, was seen at [facility] for a nonstress test.    Chief Complaint   Patient presents with   • Back Pain     Pt to L/D with c/o back pain that started around 0200.  Pt denies history of kidney stones.       Patient Active Problem List   Diagnosis   • Prenatal care, subsequent pregnancy in third trimester   • Other constipation   • Migraine without aura and without status migrainosus, not intractable   • Iron deficiency anemia during pregnancy   • Pregnancy   • Kidney stone complicating pregnancy, third trimester       Start Time: 1037  Stop Time: 1110    Interpretation A  Nonstress Test Interpretation A: Reactive (20 1110 : Uma Quiros, RN)

## 2020-03-04 NOTE — NON STRESS TEST
Fe Smith, a  at 33w1d with an CALEB of 2020, by Last Menstrual Period, was seen at [facility] for a nonstress test.    Chief Complaint   Patient presents with   • Back Pain     Pt to L/D with c/o back pain that started around 0200.  Pt denies history of kidney stones.       Patient Active Problem List   Diagnosis   • Prenatal care, subsequent pregnancy in third trimester   • Other constipation   • Migraine without aura and without status migrainosus, not intractable   • Iron deficiency anemia during pregnancy   • Pregnancy   • Kidney stone complicating pregnancy, third trimester       Start Time:   Stop Time:     Interpretation A  Nonstress Test Interpretation A: Reactive (20 : Alexandra Stephenson, RN)

## 2020-03-04 NOTE — PLAN OF CARE
Problem: Patient Care Overview  Goal: Plan of Care Review  Outcome: Ongoing (interventions implemented as appropriate)  Flowsheets  Taken 3/3/2020 0607 by Alexandra Stephenson, RN  Progress: no change  Taken 3/4/2020 1030 by Uma Quiros RN  Plan of Care Reviewed With: patient  Taken 3/4/2020 1403 by Uma Quiros RN  Outcome Summary: RNST; no VB, no LOF, no UCs, +FM; doing very well overall with no pain reported; urology stated she does not need to be seen; d/c today per Dr. Ha  Goal: Individualization and Mutuality  Outcome: Ongoing (interventions implemented as appropriate)  Flowsheets  Taken 3/2/2020 0616 by Gracie Gabriel RN  Patient Specific Goals (Include Timeframe): pain at a tolerable level  How to Address Anxieties/Fears: educate  Patient Specific Interventions: can have pain meds q2 hours prn, tylenol ordered and zofran  What Anxieties, Fears, Concerns, or Questions Do You Have About Your Care?: staying hospitalized for long time  Taken 3/1/2020 1723 by Claudia Paez RN  What Information Would Help Us Give You More Personalized Care?: keep informed of plan of care  How Would You and/or Your Support Person Like to Participate in Your Care?: be as involved in decision making as possible  Taken 3/3/2020 0609 by Alexandra Stephenson RN  Patient Specific Preferences: wants to go home  Goal: Discharge Needs Assessment  Outcome: Ongoing (interventions implemented as appropriate)  Flowsheets  Taken 3/4/2020 1401 by Uma Quiros, RN  Equipment Needed After Discharge: none  Equipment Currently Used at Home: none  Anticipated Changes Related to Illness: none  Transportation Anticipated: car, drives self;family or friend will provide  Transportation Concerns: car, none  Concerns to be Addressed: no discharge needs identified  Readmission Within the Last 30 Days: no previous admission in last 30 days  Patient/Family Anticipated Services at Transition: none  Patient/Family Anticipates Transition to: home;home  with family  Taken 3/1/2020 1723 by Claudia Paez, CHRISTINA  Offered/Gave Vendor List: no  Goal: Interprofessional Rounds/Family Conf  Outcome: Ongoing (interventions implemented as appropriate)  Flowsheets (Taken 3/1/2020 1723 by Claudia Paez, RN)  Participants: family;nursing;physician;patient     Problem: Prenatal Patient, Hospitalized (Adult,Obstetrics,Pediatric)  Goal: Signs and Symptoms of Listed Potential Problems Will be Absent, Minimized or Managed (Prenatal Patient, Hospitalized)  Outcome: Ongoing (interventions implemented as appropriate)  Flowsheets  Taken 3/1/2020 1723 by Claudia Paez, RN  Problems Assessed (Prenatal Patient): all  Taken 3/2/2020 1705 by Vidhi Govea RN  Problems Present (Prenatal Patient): infection     Problem: Pain, Acute (Adult)  Goal: Identify Related Risk Factors and Signs and Symptoms  Outcome: Ongoing (interventions implemented as appropriate)  Flowsheets (Taken 3/4/2020 1403)  Related Risk Factors (Acute Pain): disease process  Signs and Symptoms (Acute Pain): other (see comments)  Note:   No pain  Goal: Acceptable Pain Control/Comfort Level  Outcome: Ongoing (interventions implemented as appropriate)  Flowsheets (Taken 3/4/2020 1403)  Acceptable Pain Control/Comfort Level: making progress toward outcome

## 2020-03-04 NOTE — PROGRESS NOTES
"   LOS: 3 days   Patient Care Team:  Mekhi Kapoor MD as PCP - General (Obstetrics and Gynecology)    Chief Complaint: kidney stones    Subjective     History of Present Illness - Patient is a 24 year old  at 33+ weeks who was admitted for a kidney stone.  She had been on narcotic pain medication until yesterday.  Over the past 12 to 24 hours, patient feels improved.  Her pain is essentially resolved.  She denies nausea, vomiting or bleeding.  Fetal movement is good.      Subjective    History taken from: patient    Objective     Vital Signs  Temp:  [98.4 °F (36.9 °C)-98.6 °F (37 °C)] 98.6 °F (37 °C)  Heart Rate:  [86-90] 90  Resp:  [16-20] 20  BP: ()/(50-74) 122/74    Objective:  General Appearance:  Comfortable, well-appearing and in no acute distress.    Vital signs: (most recent): Blood pressure 122/74, pulse 90, temperature 98.6 °F (37 °C), temperature source Oral, resp. rate 20, height 152.4 cm (60\"), weight 73.5 kg (162 lb), last menstrual period 07/15/2019, SpO2 99 %, unknown if currently breastfeeding.  Vital signs are normal.    Output: Producing urine and producing stool.    HEENT: Normal HEENT exam.    Lungs:  Normal effort and normal respiratory rate.    Heart: Normal rate.  Regular rhythm.    Abdomen: Abdomen is soft.  There is no abdominal tenderness.  There is no rebound tenderness.  There is no guarding.     Extremities: Normal range of motion.  There is no dependent edema.    Neurological: Patient is alert.  Patient has normal coordination.    Skin:  Warm and dry.              Results Review:     E. Coli sensitive to Cephalosporins.                     NST Category 1 overall.    Medication Review: Rocephin    Assessment/Plan       Kidney stone complicating pregnancy, third trimester    Pregnancy      Assessment & Plan  IUP at 33 weeks with kidney stone/UTI  - Patient is clinically improved.  The situation was discussed with urology, who was consulted, and they felt that they did " not need to see the patient since she was doing better.  Discussed hydration, taking of antibiotics and follow up with her primary MD.  - Fetal status reassuring overall.  Jay Ha MD  03/04/20  1:13 PM    Time: More than 50% of time spent in counseling and coordination of care:  Total face-to-face/floor time 35 min.  Time spent in counseling 20 min. Counseling included the following topics: management and treatment of kidney stones and UTI

## 2020-03-04 NOTE — PLAN OF CARE
Problem: Patient Care Overview  Goal: Plan of Care Review  Outcome: Ongoing (interventions implemented as appropriate)  Flowsheets  Taken 3/3/2020 0607 by Alexandra Stephenson RN  Progress: no change  Taken 3/3/2020 2030 by Alexandra Stephenson RN  Plan of Care Reviewed With: patient  Taken 3/3/2020 1805 by Amanda Ruiz RN  Outcome Summary: Reactive NST, +FM, denies any vaginal bleeding or leaking of fluid, or contractions at this time. Urine being strained and patient on iv fluids- LR at 125 cc/hr.  Goal: Individualization and Mutuality  Outcome: Ongoing (interventions implemented as appropriate)  Flowsheets  Taken 3/2/2020 0616 by Gracie Gabriel, RN  Patient Specific Goals (Include Timeframe): pain at a tolerable level  How to Address Anxieties/Fears: educate  Patient Specific Interventions: can have pain meds q2 hours prn, tylenol ordered and zofran  What Anxieties, Fears, Concerns, or Questions Do You Have About Your Care?: staying hospitalized for long time  Taken 3/1/2020 1723 by Claudia Paez RN  What Information Would Help Us Give You More Personalized Care?: keep informed of plan of care  How Would You and/or Your Support Person Like to Participate in Your Care?: be as involved in decision making as possible  Taken 3/3/2020 0609 by Alexandra Stephenson RN  Patient Specific Preferences: wants to go home  Goal: Discharge Needs Assessment  Outcome: Ongoing (interventions implemented as appropriate)  Flowsheets  Taken 3/1/2020 1723 by Claudia Paez RN  Equipment Needed After Discharge: none  Anticipated Changes Related to Illness: none  Transportation Concerns: car, none  Readmission Within the Last 30 Days: no previous admission in last 30 days  Offered/Gave Vendor List: no  Taken 3/1/2020 1329 by Claudia Paez RN  Equipment Currently Used at Home: none  Taken 3/2/2020 0616 by Gracie Gabriel RN  Transportation Anticipated: family or friend will provide  Concerns to be Addressed: adjustment to  diagnosis/illness  Taken 3/1/2020 1328 by Claudia Paez RN  Patient/Family Anticipated Services at Transition: none  Taken 3/3/2020 0609 by Alexandra Stephenson, RN  Patient/Family Anticipates Transition to: home  Goal: Interprofessional Rounds/Family Conf  Outcome: Ongoing (interventions implemented as appropriate)  Flowsheets (Taken 3/1/2020 1723 by Claudia Paez, RN)  Participants: family;nursing;physician;patient     Problem: Prenatal Patient, Hospitalized (Adult,Obstetrics,Pediatric)  Goal: Signs and Symptoms of Listed Potential Problems Will be Absent, Minimized or Managed (Prenatal Patient, Hospitalized)  Outcome: Ongoing (interventions implemented as appropriate)  Flowsheets  Taken 3/1/2020 1723 by Claudia Paez RN  Problems Assessed (Prenatal Patient): all  Taken 3/2/2020 1705 by Vidhi Govea RN  Problems Present (Prenatal Patient): infection     Problem: Pain, Acute (Adult)  Goal: Identify Related Risk Factors and Signs and Symptoms  Outcome: Ongoing (interventions implemented as appropriate)  Flowsheets (Taken 3/2/2020 0616 by Gracie Garbiel, RN)  Related Risk Factors (Acute Pain): persistent pain  Signs and Symptoms (Acute Pain): guarding/abnormal posturing/positioning;verbalization of pain descriptors  Goal: Acceptable Pain Control/Comfort Level  Outcome: Ongoing (interventions implemented as appropriate)  Flowsheets (Taken 3/2/2020 1705 by Vidhi Govea RN)  Acceptable Pain Control/Comfort Level: making progress toward outcome

## 2020-03-05 DIAGNOSIS — Z34.81 PRENATAL CARE, SUBSEQUENT PREGNANCY IN FIRST TRIMESTER: ICD-10-CM

## 2020-03-05 DIAGNOSIS — O21.9 NAUSEA AND VOMITING IN PREGNANCY: ICD-10-CM

## 2020-03-05 NOTE — DISCHARGE SUMMARY
Discharge Summary    Patient Identification:  Name:   Fe Smith  Age:   24 y.o.  :   1995  MRN:  [id number]    Admit Date:  3/2/2020    Discharge Date:  3/4/2020    Admitting Physician:  Joann Chamorro MD    Discharge Physician:  Jay Ha MD    Admission Diagnosis:  IUP at 33 weeks.  Kidney Stone.  Antepartum UTI.    Discharge Diagnosis:  Same    Discharge Condition:  Good    Hospital Course:  Patient is a 24 year old female  admitted after presenting with left sided back pain with work up positive for left kidney stone.  In addition, urine was positive for E.coli.  She was started on IV Rocephin and given IV narcotics.  Over 48 hours, patient with improved pain and symptoms.  She no longer required narcotics at time of discharge.  She was stable and sent home on course of Keflex.  She was advised to follow up in one week.    Procedures:  None    Current Medications:    No medications prior to admission.       Discharge Condition:  Good    Discharge Disposition/Location:  Home    Jay Ha MD

## 2020-03-06 RX ORDER — PROMETHAZINE HYDROCHLORIDE 12.5 MG/1
12.5 TABLET ORAL EVERY 6 HOURS PRN
Qty: 30 TABLET | Refills: 0 | OUTPATIENT
Start: 2020-03-06

## 2020-03-06 RX ORDER — PROMETHAZINE HYDROCHLORIDE 12.5 MG/1
12.5 TABLET ORAL EVERY 6 HOURS PRN
Qty: 30 TABLET | Refills: 0 | Status: SHIPPED | OUTPATIENT
Start: 2020-03-06 | End: 2020-05-22

## 2020-03-12 ENCOUNTER — ROUTINE PRENATAL (OUTPATIENT)
Dept: OBSTETRICS AND GYNECOLOGY | Facility: CLINIC | Age: 25
End: 2020-03-12

## 2020-03-12 VITALS — SYSTOLIC BLOOD PRESSURE: 118 MMHG | WEIGHT: 165 LBS | DIASTOLIC BLOOD PRESSURE: 86 MMHG | BODY MASS INDEX: 32.22 KG/M2

## 2020-03-12 DIAGNOSIS — Z34.83 PRENATAL CARE, SUBSEQUENT PREGNANCY IN THIRD TRIMESTER: Primary | ICD-10-CM

## 2020-03-12 LAB
GLUCOSE UR STRIP-MCNC: NEGATIVE MG/DL
PROT UR STRIP-MCNC: NEGATIVE MG/DL

## 2020-03-12 PROCEDURE — 99213 OFFICE O/P EST LOW 20 MIN: CPT | Performed by: OBSTETRICS & GYNECOLOGY

## 2020-03-12 NOTE — PROGRESS NOTES
CC: 34w3d IUP  Fetus active  No ctx  ROS: Depression symptoms resolved spontaneously without medication  A: sl LGA by exam  P: Growth scan 2 weeks

## 2020-03-25 ENCOUNTER — ROUTINE PRENATAL (OUTPATIENT)
Dept: OBSTETRICS AND GYNECOLOGY | Facility: CLINIC | Age: 25
End: 2020-03-25

## 2020-03-25 ENCOUNTER — PROCEDURE VISIT (OUTPATIENT)
Dept: OBSTETRICS AND GYNECOLOGY | Facility: CLINIC | Age: 25
End: 2020-03-25

## 2020-03-25 VITALS — BODY MASS INDEX: 33.2 KG/M2 | DIASTOLIC BLOOD PRESSURE: 97 MMHG | WEIGHT: 170 LBS | SYSTOLIC BLOOD PRESSURE: 141 MMHG

## 2020-03-25 DIAGNOSIS — Z34.83 PRENATAL CARE, SUBSEQUENT PREGNANCY IN THIRD TRIMESTER: Primary | ICD-10-CM

## 2020-03-25 DIAGNOSIS — O26.849 UTERINE SIZE DATE DISCREPANCY PREGNANCY: ICD-10-CM

## 2020-03-25 DIAGNOSIS — Z36.89 ENCOUNTER FOR ULTRASOUND TO CHECK FETAL GROWTH: Primary | ICD-10-CM

## 2020-03-25 LAB
GLUCOSE UR STRIP-MCNC: NEGATIVE MG/DL
PROT UR STRIP-MCNC: NEGATIVE MG/DL

## 2020-03-25 PROCEDURE — 99213 OFFICE O/P EST LOW 20 MIN: CPT | Performed by: OBSTETRICS & GYNECOLOGY

## 2020-03-25 PROCEDURE — 76816 OB US FOLLOW-UP PER FETUS: CPT | Performed by: OBSTETRICS & GYNECOLOGY

## 2020-03-25 NOTE — PROGRESS NOTES
CC: 36w2d IUP  Fetus active  B-H ctx  EFW 5-11 25% SEBASTIAN 14  ROS: Patient upset regarding visitation recommendations while in labor  BP recheck 130/86  A: Borderline SGA with normal SEBASTIAN  P: GBS done  Recheck growth 2 weeks  [employer] cts

## 2020-03-27 LAB — GP B STREP DNA SPEC QL NAA+PROBE: NEGATIVE

## 2020-04-01 ENCOUNTER — ROUTINE PRENATAL (OUTPATIENT)
Dept: OBSTETRICS AND GYNECOLOGY | Facility: CLINIC | Age: 25
End: 2020-04-01

## 2020-04-01 VITALS — WEIGHT: 173 LBS | BODY MASS INDEX: 33.79 KG/M2 | DIASTOLIC BLOOD PRESSURE: 66 MMHG | SYSTOLIC BLOOD PRESSURE: 136 MMHG

## 2020-04-01 DIAGNOSIS — Z34.83 PRENATAL CARE, SUBSEQUENT PREGNANCY IN THIRD TRIMESTER: Primary | ICD-10-CM

## 2020-04-01 LAB
GLUCOSE UR STRIP-MCNC: NEGATIVE MG/DL
PROT UR STRIP-MCNC: ABNORMAL MG/DL

## 2020-04-01 PROCEDURE — 99213 OFFICE O/P EST LOW 20 MIN: CPT | Performed by: OBSTETRICS & GYNECOLOGY

## 2020-04-01 NOTE — PROGRESS NOTES
CC: 37w2d IUP  Fetus active  Sporadic ctx  ROS: bled with IC 3 d ago- has stopped  GBS neg  A: borderline SGA  P: Growth scan next week  Kick counts  Discussed signs and symptoms of labor

## 2020-04-08 ENCOUNTER — ROUTINE PRENATAL (OUTPATIENT)
Dept: OBSTETRICS AND GYNECOLOGY | Facility: CLINIC | Age: 25
End: 2020-04-08

## 2020-04-08 ENCOUNTER — HOSPITAL ENCOUNTER (OUTPATIENT)
Dept: LABOR AND DELIVERY | Facility: HOSPITAL | Age: 25
Discharge: HOME | End: 2020-04-08

## 2020-04-08 ENCOUNTER — PROCEDURE VISIT (OUTPATIENT)
Dept: OBSTETRICS AND GYNECOLOGY | Facility: CLINIC | Age: 25
End: 2020-04-08

## 2020-04-08 ENCOUNTER — HOSPITAL ENCOUNTER (INPATIENT)
Facility: HOSPITAL | Age: 25
LOS: 2 days | Discharge: HOME | End: 2020-04-10
Attending: OBSTETRICS & GYNECOLOGY | Admitting: OBSTETRICS & GYNECOLOGY

## 2020-04-08 VITALS — BODY MASS INDEX: 34.57 KG/M2 | SYSTOLIC BLOOD PRESSURE: 148 MMHG | DIASTOLIC BLOOD PRESSURE: 80 MMHG | WEIGHT: 177 LBS

## 2020-04-08 DIAGNOSIS — Z34.83 PRENATAL CARE, SUBSEQUENT PREGNANCY IN THIRD TRIMESTER: Primary | ICD-10-CM

## 2020-04-08 DIAGNOSIS — O13.3 GESTATIONAL HYPERTENSION WITHOUT SIGNIFICANT PROTEINURIA IN THIRD TRIMESTER: ICD-10-CM

## 2020-04-08 DIAGNOSIS — Z36.89 ENCOUNTER FOR ULTRASOUND TO CHECK FETAL GROWTH: Primary | ICD-10-CM

## 2020-04-08 PROBLEM — Z01.89: Status: ACTIVE | Noted: 2020-04-08

## 2020-04-08 LAB
ABO GROUP BLD: NORMAL
ALBUMIN SERPL-MCNC: 3.8 G/DL (ref 3.5–5.2)
ALBUMIN/GLOB SERPL: 1.2 G/DL
ALP SERPL-CCNC: 95 U/L (ref 39–117)
ALT SERPL W P-5'-P-CCNC: 7 U/L (ref 1–33)
ANION GAP SERPL CALCULATED.3IONS-SCNC: 13.7 MMOL/L (ref 5–15)
AST SERPL-CCNC: 11 U/L (ref 1–32)
BILIRUB SERPL-MCNC: 0.3 MG/DL (ref 0.2–1.2)
BLD GP AB SCN SERPL QL: NEGATIVE
BUN BLD-MCNC: 9 MG/DL (ref 6–20)
BUN/CREAT SERPL: 20 (ref 7–25)
CALCIUM SPEC-SCNC: 9.2 MG/DL (ref 8.6–10.5)
CHLORIDE SERPL-SCNC: 102 MMOL/L (ref 98–107)
CO2 SERPL-SCNC: 21.3 MMOL/L (ref 22–29)
CREAT BLD-MCNC: 0.45 MG/DL (ref 0.57–1)
DEPRECATED RDW RBC AUTO: 42.8 FL (ref 37–54)
ERYTHROCYTE [DISTWIDTH] IN BLOOD BY AUTOMATED COUNT: 13 % (ref 12.3–15.4)
GFR SERPL CREATININE-BSD FRML MDRD: >150 ML/MIN/1.73
GLOBULIN UR ELPH-MCNC: 3.3 GM/DL
GLUCOSE BLD-MCNC: 109 MG/DL (ref 65–99)
GLUCOSE UR STRIP-MCNC: NEGATIVE MG/DL
HCT VFR BLD AUTO: 27.5 % (ref 34–46.6)
HGB BLD-MCNC: 9.4 G/DL (ref 12–15.9)
MCH RBC QN AUTO: 30.9 PG (ref 26.6–33)
MCHC RBC AUTO-ENTMCNC: 34.2 G/DL (ref 31.5–35.7)
MCV RBC AUTO: 90.5 FL (ref 79–97)
PLATELET # BLD AUTO: 195 10*3/MM3 (ref 140–450)
PMV BLD AUTO: 11.2 FL (ref 6–12)
POTASSIUM BLD-SCNC: 3.5 MMOL/L (ref 3.5–5.2)
PROT SERPL-MCNC: 7.1 G/DL (ref 6–8.5)
PROT UR STRIP-MCNC: NEGATIVE MG/DL
RBC # BLD AUTO: 3.04 10*6/MM3 (ref 3.77–5.28)
RH BLD: POSITIVE
SODIUM BLD-SCNC: 137 MMOL/L (ref 136–145)
T&S EXPIRATION DATE: NORMAL
WBC NRBC COR # BLD: 12.28 10*3/MM3 (ref 3.4–10.8)

## 2020-04-08 PROCEDURE — 99213 OFFICE O/P EST LOW 20 MIN: CPT | Performed by: OBSTETRICS & GYNECOLOGY

## 2020-04-08 PROCEDURE — 86900 BLOOD TYPING SEROLOGIC ABO: CPT | Performed by: OBSTETRICS & GYNECOLOGY

## 2020-04-08 PROCEDURE — 76816 OB US FOLLOW-UP PER FETUS: CPT | Performed by: OBSTETRICS & GYNECOLOGY

## 2020-04-08 PROCEDURE — 25010000002 ONDANSETRON PER 1 MG: Performed by: OBSTETRICS & GYNECOLOGY

## 2020-04-08 PROCEDURE — 80053 COMPREHEN METABOLIC PANEL: CPT | Performed by: OBSTETRICS & GYNECOLOGY

## 2020-04-08 PROCEDURE — 3E0P7VZ INTRODUCTION OF HORMONE INTO FEMALE REPRODUCTIVE, VIA NATURAL OR ARTIFICIAL OPENING: ICD-10-PCS | Performed by: OBSTETRICS & GYNECOLOGY

## 2020-04-08 PROCEDURE — 85027 COMPLETE CBC AUTOMATED: CPT | Performed by: OBSTETRICS & GYNECOLOGY

## 2020-04-08 PROCEDURE — 86901 BLOOD TYPING SEROLOGIC RH(D): CPT | Performed by: OBSTETRICS & GYNECOLOGY

## 2020-04-08 PROCEDURE — 86850 RBC ANTIBODY SCREEN: CPT | Performed by: OBSTETRICS & GYNECOLOGY

## 2020-04-08 RX ORDER — TERBUTALINE SULFATE 1 MG/ML
0.25 INJECTION, SOLUTION SUBCUTANEOUS AS NEEDED
Status: DISCONTINUED | OUTPATIENT
Start: 2020-04-08 | End: 2020-04-09 | Stop reason: HOSPADM

## 2020-04-08 RX ORDER — SODIUM CHLORIDE, SODIUM LACTATE, POTASSIUM CHLORIDE, CALCIUM CHLORIDE 600; 310; 30; 20 MG/100ML; MG/100ML; MG/100ML; MG/100ML
125 INJECTION, SOLUTION INTRAVENOUS CONTINUOUS
Status: DISCONTINUED | OUTPATIENT
Start: 2020-04-08 | End: 2020-04-09

## 2020-04-08 RX ORDER — FAMOTIDINE 10 MG/ML
20 INJECTION, SOLUTION INTRAVENOUS 2 TIMES DAILY
Status: DISCONTINUED | OUTPATIENT
Start: 2020-04-08 | End: 2020-04-09

## 2020-04-08 RX ORDER — ACETAMINOPHEN 500 MG
500 TABLET ORAL EVERY 6 HOURS
Status: DISCONTINUED | OUTPATIENT
Start: 2020-04-08 | End: 2020-04-09

## 2020-04-08 RX ORDER — ONDANSETRON 2 MG/ML
4 INJECTION INTRAMUSCULAR; INTRAVENOUS EVERY 6 HOURS PRN
Status: DISCONTINUED | OUTPATIENT
Start: 2020-04-08 | End: 2020-04-09

## 2020-04-08 RX ORDER — MISOPROSTOL 100 MCG
25 TABLET ORAL
Status: DISCONTINUED | OUTPATIENT
Start: 2020-04-09 | End: 2020-04-09

## 2020-04-08 RX ADMIN — ACETAMINOPHEN 500 MG: 500 TABLET, FILM COATED ORAL at 23:08

## 2020-04-08 RX ADMIN — ONDANSETRON 4 MG: 2 INJECTION INTRAMUSCULAR; INTRAVENOUS at 22:46

## 2020-04-08 RX ADMIN — MISOPROSTOL 25 MCG: 100 TABLET ORAL at 23:06

## 2020-04-08 NOTE — PROGRESS NOTES
CC: 38w2d IUP  Fetus remains active  B-H ctx  EFW 6-8 24% SEBASTIAN 15  HC 8%  ROS: + h/a  Denies viz sx  + LE swelling  A: mild gest hypertension  Borderline SGA  P: Disc options  Rec IOL with Cytotec ripening tonight  Pt states 1st labor lasted 35 hrs with induction

## 2020-04-09 ENCOUNTER — ANESTHESIA (OUTPATIENT)
Dept: LABOR AND DELIVERY | Facility: HOSPITAL | Age: 25
End: 2020-04-09

## 2020-04-09 ENCOUNTER — ANESTHESIA EVENT (OUTPATIENT)
Dept: LABOR AND DELIVERY | Facility: HOSPITAL | Age: 25
End: 2020-04-09

## 2020-04-09 PROCEDURE — 25010000002 ONDANSETRON PER 1 MG: Performed by: OBSTETRICS & GYNECOLOGY

## 2020-04-09 PROCEDURE — S0260 H&P FOR SURGERY: HCPCS | Performed by: OBSTETRICS & GYNECOLOGY

## 2020-04-09 PROCEDURE — 10907ZC DRAINAGE OF AMNIOTIC FLUID, THERAPEUTIC FROM PRODUCTS OF CONCEPTION, VIA NATURAL OR ARTIFICIAL OPENING: ICD-10-PCS | Performed by: OBSTETRICS & GYNECOLOGY

## 2020-04-09 PROCEDURE — C1755 CATHETER, INTRASPINAL: HCPCS

## 2020-04-09 PROCEDURE — C1755 CATHETER, INTRASPINAL: HCPCS | Performed by: ANESTHESIOLOGY

## 2020-04-09 PROCEDURE — 59410 OBSTETRICAL CARE: CPT | Performed by: OBSTETRICS & GYNECOLOGY

## 2020-04-09 RX ORDER — PROMETHAZINE HYDROCHLORIDE 12.5 MG/1
12.5 TABLET ORAL EVERY 4 HOURS PRN
Status: DISCONTINUED | OUTPATIENT
Start: 2020-04-09 | End: 2020-04-10 | Stop reason: HOSPADM

## 2020-04-09 RX ORDER — IBUPROFEN 800 MG/1
800 TABLET ORAL EVERY 8 HOURS PRN
Status: DISCONTINUED | OUTPATIENT
Start: 2020-04-09 | End: 2020-04-10 | Stop reason: HOSPADM

## 2020-04-09 RX ORDER — OXYTOCIN-SODIUM CHLORIDE 0.9% IV SOLN 30 UNIT/500ML 30-0.9/5 UT/ML-%
999 SOLUTION INTRAVENOUS ONCE
Status: COMPLETED | OUTPATIENT
Start: 2020-04-09 | End: 2020-04-09

## 2020-04-09 RX ORDER — MISOPROSTOL 200 UG/1
800 TABLET ORAL AS NEEDED
Status: DISCONTINUED | OUTPATIENT
Start: 2020-04-09 | End: 2020-04-09 | Stop reason: HOSPADM

## 2020-04-09 RX ORDER — METHYLERGONOVINE MALEATE 0.2 MG/ML
200 INJECTION INTRAVENOUS ONCE AS NEEDED
Status: DISCONTINUED | OUTPATIENT
Start: 2020-04-09 | End: 2020-04-09 | Stop reason: HOSPADM

## 2020-04-09 RX ORDER — FERROUS SULFATE 325(65) MG
325 TABLET ORAL 2 TIMES DAILY WITH MEALS
Status: DISCONTINUED | OUTPATIENT
Start: 2020-04-09 | End: 2020-04-10 | Stop reason: HOSPADM

## 2020-04-09 RX ORDER — HYDROCODONE BITARTRATE AND ACETAMINOPHEN 5; 325 MG/1; MG/1
2 TABLET ORAL EVERY 4 HOURS PRN
Status: DISCONTINUED | OUTPATIENT
Start: 2020-04-09 | End: 2020-04-10 | Stop reason: HOSPADM

## 2020-04-09 RX ORDER — DIPHENHYDRAMINE HYDROCHLORIDE 50 MG/ML
12.5 INJECTION INTRAMUSCULAR; INTRAVENOUS EVERY 8 HOURS PRN
Status: DISCONTINUED | OUTPATIENT
Start: 2020-04-09 | End: 2020-04-09 | Stop reason: HOSPADM

## 2020-04-09 RX ORDER — SODIUM CHLORIDE 0.9 % (FLUSH) 0.9 %
1-10 SYRINGE (ML) INJECTION AS NEEDED
Status: DISCONTINUED | OUTPATIENT
Start: 2020-04-09 | End: 2020-04-10 | Stop reason: HOSPADM

## 2020-04-09 RX ORDER — PRENATAL VIT NO.126/IRON/FOLIC 28MG-0.8MG
1 TABLET ORAL DAILY
Status: DISCONTINUED | OUTPATIENT
Start: 2020-04-09 | End: 2020-04-10 | Stop reason: HOSPADM

## 2020-04-09 RX ORDER — ERYTHROMYCIN 5 MG/G
OINTMENT OPHTHALMIC
Status: DISPENSED
Start: 2020-04-09 | End: 2020-04-10

## 2020-04-09 RX ORDER — PHYTONADIONE 1 MG/.5ML
INJECTION, EMULSION INTRAMUSCULAR; INTRAVENOUS; SUBCUTANEOUS
Status: DISPENSED
Start: 2020-04-09 | End: 2020-04-10

## 2020-04-09 RX ORDER — CARBOPROST TROMETHAMINE 250 UG/ML
250 INJECTION, SOLUTION INTRAMUSCULAR AS NEEDED
Status: DISCONTINUED | OUTPATIENT
Start: 2020-04-09 | End: 2020-04-09 | Stop reason: HOSPADM

## 2020-04-09 RX ORDER — BISACODYL 10 MG
10 SUPPOSITORY, RECTAL RECTAL DAILY PRN
Status: DISCONTINUED | OUTPATIENT
Start: 2020-04-10 | End: 2020-04-10 | Stop reason: HOSPADM

## 2020-04-09 RX ORDER — OXYTOCIN-SODIUM CHLORIDE 0.9% IV SOLN 30 UNIT/500ML 30-0.9/5 UT/ML-%
125 SOLUTION INTRAVENOUS CONTINUOUS PRN
Status: DISCONTINUED | OUTPATIENT
Start: 2020-04-09 | End: 2020-04-09 | Stop reason: HOSPADM

## 2020-04-09 RX ORDER — HYDROCODONE BITARTRATE AND ACETAMINOPHEN 5; 325 MG/1; MG/1
1 TABLET ORAL EVERY 4 HOURS PRN
Status: DISCONTINUED | OUTPATIENT
Start: 2020-04-09 | End: 2020-04-10 | Stop reason: HOSPADM

## 2020-04-09 RX ORDER — OXYTOCIN-SODIUM CHLORIDE 0.9% IV SOLN 30 UNIT/500ML 30-0.9/5 UT/ML-%
250 SOLUTION INTRAVENOUS CONTINUOUS
Status: ACTIVE | OUTPATIENT
Start: 2020-04-09 | End: 2020-04-09

## 2020-04-09 RX ORDER — ONDANSETRON 2 MG/ML
4 INJECTION INTRAMUSCULAR; INTRAVENOUS EVERY 6 HOURS PRN
Status: DISCONTINUED | OUTPATIENT
Start: 2020-04-09 | End: 2020-04-10 | Stop reason: HOSPADM

## 2020-04-09 RX ORDER — LANOLIN
CREAM (ML) TOPICAL
Status: DISCONTINUED | OUTPATIENT
Start: 2020-04-09 | End: 2020-04-10 | Stop reason: HOSPADM

## 2020-04-09 RX ORDER — ONDANSETRON 2 MG/ML
4 INJECTION INTRAMUSCULAR; INTRAVENOUS ONCE AS NEEDED
Status: DISCONTINUED | OUTPATIENT
Start: 2020-04-09 | End: 2020-04-09 | Stop reason: HOSPADM

## 2020-04-09 RX ORDER — OXYTOCIN-SODIUM CHLORIDE 0.9% IV SOLN 30 UNIT/500ML 30-0.9/5 UT/ML-%
2-20 SOLUTION INTRAVENOUS
Status: DISCONTINUED | OUTPATIENT
Start: 2020-04-09 | End: 2020-04-09

## 2020-04-09 RX ORDER — CALCIUM CARBONATE 200(500)MG
2 TABLET,CHEWABLE ORAL 3 TIMES DAILY PRN
Status: DISCONTINUED | OUTPATIENT
Start: 2020-04-09 | End: 2020-04-10 | Stop reason: HOSPADM

## 2020-04-09 RX ORDER — LIDOCAINE HYDROCHLORIDE AND EPINEPHRINE 15; 5 MG/ML; UG/ML
INJECTION, SOLUTION EPIDURAL AS NEEDED
Status: DISCONTINUED | OUTPATIENT
Start: 2020-04-09 | End: 2020-04-09 | Stop reason: SURG

## 2020-04-09 RX ORDER — DOCUSATE SODIUM 100 MG/1
100 CAPSULE, LIQUID FILLED ORAL 2 TIMES DAILY
Status: DISCONTINUED | OUTPATIENT
Start: 2020-04-09 | End: 2020-04-10 | Stop reason: HOSPADM

## 2020-04-09 RX ORDER — MISOPROSTOL 200 UG/1
600 TABLET ORAL ONCE AS NEEDED
Status: DISCONTINUED | OUTPATIENT
Start: 2020-04-09 | End: 2020-04-10 | Stop reason: HOSPADM

## 2020-04-09 RX ORDER — EPHEDRINE SULFATE 50 MG/ML
5 INJECTION, SOLUTION INTRAVENOUS
Status: DISCONTINUED | OUTPATIENT
Start: 2020-04-09 | End: 2020-04-09 | Stop reason: HOSPADM

## 2020-04-09 RX ORDER — FAMOTIDINE 10 MG/ML
20 INJECTION, SOLUTION INTRAVENOUS ONCE AS NEEDED
Status: DISCONTINUED | OUTPATIENT
Start: 2020-04-09 | End: 2020-04-09 | Stop reason: HOSPADM

## 2020-04-09 RX ORDER — DIPHENHYDRAMINE HCL 25 MG
25 CAPSULE ORAL NIGHTLY PRN
Status: DISCONTINUED | OUTPATIENT
Start: 2020-04-09 | End: 2020-04-10 | Stop reason: HOSPADM

## 2020-04-09 RX ADMIN — ONDANSETRON 4 MG: 2 INJECTION INTRAMUSCULAR; INTRAVENOUS at 16:42

## 2020-04-09 RX ADMIN — IBUPROFEN 800 MG: 800 TABLET, FILM COATED ORAL at 14:02

## 2020-04-09 RX ADMIN — SODIUM CHLORIDE, POTASSIUM CHLORIDE, SODIUM LACTATE AND CALCIUM CHLORIDE 125 ML/HR: 600; 310; 30; 20 INJECTION, SOLUTION INTRAVENOUS at 11:50

## 2020-04-09 RX ADMIN — OXYTOCIN 125 ML/HR: 10 INJECTION, SOLUTION INTRAMUSCULAR; INTRAVENOUS at 14:45

## 2020-04-09 RX ADMIN — Medication 8 ML/HR: at 08:45

## 2020-04-09 RX ADMIN — SODIUM CHLORIDE, POTASSIUM CHLORIDE, SODIUM LACTATE AND CALCIUM CHLORIDE 125 ML/HR: 600; 310; 30; 20 INJECTION, SOLUTION INTRAVENOUS at 04:02

## 2020-04-09 RX ADMIN — ONDANSETRON 4 MG: 2 INJECTION INTRAMUSCULAR; INTRAVENOUS at 10:34

## 2020-04-09 RX ADMIN — LIDOCAINE HYDROCHLORIDE AND EPINEPHRINE 3 ML: 15; 5 INJECTION, SOLUTION EPIDURAL at 08:42

## 2020-04-09 RX ADMIN — LIDOCAINE HYDROCHLORIDE AND EPINEPHRINE 2 ML: 15; 5 INJECTION, SOLUTION EPIDURAL at 08:43

## 2020-04-09 RX ADMIN — SODIUM CHLORIDE, POTASSIUM CHLORIDE, SODIUM LACTATE AND CALCIUM CHLORIDE 999 ML/HR: 600; 310; 30; 20 INJECTION, SOLUTION INTRAVENOUS at 10:37

## 2020-04-09 RX ADMIN — OXYTOCIN 999 ML/HR: 10 INJECTION, SOLUTION INTRAMUSCULAR; INTRAVENOUS at 13:25

## 2020-04-09 RX ADMIN — MISOPROSTOL 25 MCG: 100 TABLET ORAL at 04:03

## 2020-04-09 RX ADMIN — SODIUM CHLORIDE, POTASSIUM CHLORIDE, SODIUM LACTATE AND CALCIUM CHLORIDE 999 ML/HR: 600; 310; 30; 20 INJECTION, SOLUTION INTRAVENOUS at 08:48

## 2020-04-09 NOTE — LACTATION NOTE
P2. First time to breastfeed. Did not attempt with first son who is three. Breasts are large and pendulous , nipples are graspable but currently flattened by edema. This little man is very new and very sleepy. Attempted to latch and now placed in MERVAT.   Lactation Consult Note    Evaluation Completed: 2020 19:41  Patient Name: Fe Smith  :  1995  MRN:  [id number]     REFERRAL  INFORMATION:                          Date of Referral: 20   Person Making Referral: nurse  Maternal Reason for Referral: breastfeeding currently, other (see comments)(First time to breastfeed. Did not attempt with first son)  Infant Reason for Referral: other (see comments)(early term infant)    DELIVERY HISTORY:          Skin to skin initiation date/time: 2020  1:24 PM   Skin to skin end date/time:              MATERNAL ASSESSMENT:  Breast Size Issue: none (20 : Lisha Dominguez RN)  Breast Shape: pendulous, round (20 : Lisha Dominguez RN)  Breast Density: soft (20 : Lisha Dominguez RN)  Areola: dense (20 : Lisha Dominguez, RN)  Nipples: graspable, retracting (20 : Lisha Dominguez RN)                INFANT ASSESSMENT:  Information for the patient's :  Xochitl Smith [[id number]]   No past medical history on file.                                                                                                                                MATERNAL INFANT FEEDING:  Maternal Preparation: breast care, hand hygiene (20 : Lisha Dominguez RN)  Maternal Emotional State: assist needed (20 : Lisha Dominguez, RN)  Infant Positioning: clutch/football (20 : Lisha Dominguez RN)                  Milk Ejection Reflex: present (20 : Lisha Dominguez RN)           Latch Assistance: yes (20 : Lisha Dominguez RN)                               EQUIPMENT TYPE:  Breast Pump  Type: other (see comments)(will need to call insurance to send it) (04/09/20 1922 : Lisha Dominguez RN)                              BREAST PUMPING:          LACTATION REFERRALS:

## 2020-04-09 NOTE — ANESTHESIA PREPROCEDURE EVALUATION
Anesthesia Evaluation     NPO Solid Status: > 8 hours             Airway   Mallampati: II  Dental      Pulmonary    (-) not a smoker  Cardiovascular         Neuro/Psych  (+) headaches,     GI/Hepatic/Renal/Endo    (+) obesity,       Musculoskeletal     Abdominal    Substance History      OB/GYN    (+) Pregnant, pregnancy induced hypertension  (-) Preeclampsia        Other        (-) blood dyscrasia                  Anesthesia Plan    ASA 2     epidural   (Intrauterine pregnancy at 38w3d)    Anesthetic plan, all risks, benefits, and alternatives have been provided, discussed and informed consent has been obtained with: patient.

## 2020-04-09 NOTE — ANESTHESIA PROCEDURE NOTES
Labor Epidural      Patient reassessed immediately prior to procedure    Patient location during procedure: OB  Performed By  Anesthesiologist: Wil Love MD  Preanesthetic Checklist  Completed: patient identified and risks and benefits discussed  Additional Notes  Epidural placed 4 cm into epidural space.  Loss of resistance to saline.  19 gauge catheter.  No paresthesias.  Prep:  Pt Position:sitting  Sterile Tech:cap, gloves, mask and sterile barrier  Prep:chlorhexidine gluconate and isopropyl alcohol  Monitoring:blood pressure monitoring and EKG  Epidural Block Procedure:  Approach:midline  Guidance:landmark technique and palpation technique  Location:L4-L5  Needle Type:Tuohy  Needle Gauge:17  Loss of Resistance Medium: saline  Loss of Resistance: 7cm  Cath Depth at skin:11 cm  Aspiration:negative  Test Dose:negative  Post Assessment:  Dressing:occlusive dressing applied and secured with tape  Pt Tolerance:patient tolerated the procedure well with no apparent complications

## 2020-04-09 NOTE — LACTATION NOTE
"This note was copied from a baby's chart.  P2 term baby nursing well so far per Mom. This is her first baby to breast feed. Discussed: how to tell if baby is getting enough milk, feeding on demand or every 3 hrs and hand expression. Mom reports seeing \"yellow milk\". Encouraged to call for any assistance or questions.  "

## 2020-04-09 NOTE — L&D DELIVERY NOTE
Presentation    24-year-old white female  2 para 1 at 38 weeks 3 days presented for induction due to mild gestational hypertension and borderline SGA fetus.  Her prenatal course had otherwise been uncomplicated.  At the time of presentation the patient was afebrile with reassuring vital signs and fetal heart tones.  Group B strep screen was negative.    Course of labor    The patient was admitted and Cytotec cervical ripening was initiated.  She received 2 doses vaginally with spontaneous onset of labor after the second dose.  She became uncomfortable and received an epidural anesthetic.  She underwent amniotomy with release of clear fluid and internal uterine pressure catheter was placed.  She progressed uneventfully without Pitocin augmentation and went from 4 cm to complete dilatation and .  Fetal heart tones throughout labor were reassuring.   When I arrived in the birthing room, the patient had just undergone a controlled, spontaneous vaginal delivery from the occiput anterior position over an intact perineum by Dr. Diaz who was standing by while I reached labor and delivery.  She delivered a viable male infant approximately 6 pounds with Apgars of 9 and 9.  Cord was doubly clamped and cut and the baby placed on the maternal chest.  Cord blood was then obtained and the third stage was spontaneous, intact with a three-vessel cord.  There were no cervical, vaginal, or perineal lacerations.  The bladder was drained of 50 cc of clear urine.  The rectovaginal septum was inspected and found to be intact.  Estimated blood loss 200 cc.  Patient and  tolerated the delivery well and remained in the birthing room for bonding.  Maternal blood type A+.  Rubella status immune.  The patient plans to breast-feed.

## 2020-04-09 NOTE — H&P
[facility]  Obstetric History and Physical    Chief Complaint   Patient presents with   • Scheduled Induction     +fm, No LF or vag bleeding       Subjective     Patient is a 24 y.o. female  currently at 38w3d, who presents with mild gestational hypertension and borderline SGA.    Her prenatal care is complicated by  hypertension  gestational hypertension and abnormal fetal growth  small for gestational age.  Her previous obstetric/gynecological history is noted for is non-contributory.    The following portions of the patients history were reviewed and updated as appropriate: current medications, allergies, past medical history, past surgical history, past family history and past social history .       Prenatal Information:  Prenatal Results     POC Urine Glucose/Protein     Test Value Reference Range Date Time    Urine Glucose Negative  Negative, 1000 mg/dL (3+) 20     Urine Protein Negative  Negative 20           Initial Prenatal Labs     Test Value Reference Range Date Time    Hemoglobin 10.8 g/dL 12.0 - 15.9 19 1333      10.7 g/dL 12.0 - 15.9 19 1244      11.6 g/dL 11.1 - 15.9 19 1047    Hematocrit 31.3 % 34.0 - 46.6 19 1333      32.4 % 34.0 - 46.6 19 1244      33.9 % 34.0 - 46.6 19 1047    Platelets 195 10*3/mm3 140 - 450 20 2152      185 10*3/mm3 140 - 450 20 0707      213 10*3/mm3 140 - 450 20 1028      216 10*3/mm3 140 - 450 19 1333      189 10*3/mm3 140 - 450 19 1244      215 x10E3/uL 150 - 450 19 1047    Rubella IgG 1.68 index Immune >0.99 19 1047    Hepatitis B SAg Negative  Negative 19 1047    Hepatitis C Ab <0.1 s/co ratio 0.0 - 0.9 19 1047    RPR Non Reactive  Non Reactive 19 1047    ABO A   20 215    Rh Positive   20 2152    Antibody Screen Negative  Negative 19 1047    HIV Non Reactive  Non Reactive 19 1047    Urine Culture >100,000 CFU/mL Escherichia coli    03/01/20 0708      Final report   09/11/19 1047    Gonorrhea Negative  Negative 09/11/19 1125    Chlamydia Negative  Negative 09/11/19 1125    TSH 2.200 uIU/mL 0.270 - 4.200 09/11/19 1047          2nd and 3rd Trimester     Test Value Reference Range Date Time    Hemoglobin (repeated) 9.4 g/dL 12.0 - 15.9 04/08/20 2152      10.0 g/dL 12.0 - 15.9 03/01/20 0707      10.3 g/dL 12.0 - 15.9 01/08/20 1028    Hematocrit (repeated) 27.5 % 34.0 - 46.6 04/08/20 2152      29.3 % 34.0 - 46.6 03/01/20 0707      30.8 % 34.0 - 46.6 01/08/20 1028    GCT 57 mg/dL 65 - 179 01/08/20 1028    Antibody Screen (repeated) Negative   04/08/20 2152      Negative  Negative 01/08/20 1028    GTT Fasting        GTT 1 Hr        GTT 2 Hr        GTT 3 Hr        Group B Strep Negative  Negative 03/25/20 1225          Drug Screening     Test Value Reference Range Date Time    Amphetamine Screen        Barbiturate Screen        Benzodiazepine Screen        Methadone Screen        Phencyclidine Screen        Opiates Screen        THC Screen        Cocaine Screen        Propoxyphene Screen        Buprenorphine Screen        Methamphetamine Screen        Oxycodone Screen        Tricyclic Antidepressants Screen              Other (Risk screening)     Test Value Reference Range Date Time    Varicella IgG        Parvovirus IgG        CMV IgG        Cystic Fibrosis        Hemoglobin electrophoresis        NIPT        MSAFP-4        AFP (for NTD only)                  External Prenatal Results     Pregnancy Outside Results - Transcribed From Office Records - See Scanned Records For Details     Test Value Date Time    Hgb 9.4 g/dL 04/08/20 2152      10.0 g/dL 03/01/20 0707      10.3 g/dL 01/08/20 1028      10.8 g/dL 11/08/19 1333      10.7 g/dL 09/23/19 1244      11.6 g/dL 09/11/19 1047    Hct 27.5 % 04/08/20 2152      29.3 % 03/01/20 0707      30.8 % 01/08/20 1028      31.3 % 11/08/19 1333      32.4 % 09/23/19 1244      33.9 % 09/11/19 1047    ABO A  04/08/20  2152    Rh Positive  20 2152    Antibody Screen Negative  20 2152      Negative  20 1028      Negative  19 1047    Glucose Fasting GTT       Glucose Tolerance Test 1 hour       Glucose Tolerance Test 3 hour       Gonorrhea (discrete) Negative  19 1125    Chlamydia (discrete) Negative  19 1125    RPR Non Reactive  19 1047    VDRL       Syphilis Antibody       Rubella 1.68 index 19 1047    HBsAg Negative  19 1047    Herpes Simplex Virus PCR       Herpes Simplex VIrus Culture       HIV Non Reactive  19 1047    Hep C RNA Quant PCR       Hep C Antibody <0.1 s/co ratio 19 1047    AFP       Group B Strep Negative  20 1225    GBS Susceptibility to Clindamycin       GBS Susceptibility to Erythromycin       Fetal Fibronectin       Genetic Testing, Maternal Blood             Drug Screening     Test Value Date Time    Urine Drug Screen       Amphetamine Screen       Barbiturate Screen       Benzodiazepine Screen       Methadone Screen       Phencyclidine Screen       Opiates Screen       THC Screen       Cocaine Screen       Propoxyphene Screen       Buprenorphine Screen       Methamphetamine Screen       Oxycodone Screen       Tricyclic Antidepressants Screen                    Past OB History:     OB History    Para Term  AB Living   2 1 1 0 0 1   SAB TAB Ectopic Molar Multiple Live Births   0 0 0 0 0 1      # Outcome Date GA Lbr Les/2nd Weight Sex Delivery Anes PTL Lv   2 Current            1 Term 16 39w2d  3260 g (7 lb 3 oz) M Vag-Spont  N KEVIN       Past Medical History: Past Medical History:   Diagnosis Date   • Abnormal Pap smear of cervix    • Chlamydia     3 years ago      Past Surgical History Past Surgical History:   Procedure Laterality Date   • WISDOM TOOTH EXTRACTION        Family History: History reviewed. No pertinent family history.   Social History:  reports that she has never smoked. She does not have any smokeless  tobacco history on file.   reports that she does not drink alcohol.   reports that she does not use drugs.        Review of Systems   Eyes: Negative for visual disturbance.   Cardiovascular: Positive for leg swelling.   Musculoskeletal: Positive for back pain.   Neurological: Positive for headaches.   All other systems reviewed and are negative.        Objective     Vital Signs Range for the last 24 hours  Temperature: Temp:  [97.6 °F (36.4 °C)-98.4 °F (36.9 °C)] 97.6 °F (36.4 °C)   Temp Source: Temp src: Oral   BP: BP: ()/(43-80) 126/62   Pulse: Heart Rate:  [] 105   Respirations: Resp:  [18] 18   SPO2:     O2 Amount (l/min):     O2 Devices Device (Oxygen Therapy): room air   Weight: Weight:  [80.3 kg (177 lb)-81.2 kg (179 lb)] 81.2 kg (179 lb)     Physical Examination: General appearance - alert, well appearing, and in no distress  Mental status - alert, oriented to person, place, and time  Eyes - pupils equal and reactive, extraocular eye movements intact  Neck - supple, no significant adenopathy  Chest - clear to auscultation, no wheezes, rales or rhonchi, symmetric air entry  Heart - normal rate, regular rhythm, normal S1, S2, no murmurs, rubs, clicks or gallops  Abdomen - soft, nontender, nondistended, no masses or organomegaly  EFW 6 1/2#  Neurological - alert, oriented, normal speech, no focal findings or movement disorder noted  Extremities - pedal edema 1 +  Skin - normal coloration and turgor, no rashes, no suspicious skin lesions noted    Presentation: vertex   Cervix: Exam by:  RCO   Dilation: 3 cm   Effacement: Cervical Effacement: 60-70%   Station: Fetal Station: 1--> -2   AROM clear fluid IUPC placed     Fetal Heart Rate Assessment   Method: Fetal HR Assessment Method: external   Beats/min: Fetal HR (beats/min): 145   Baseline: Fetal Heart Baseline Rate: normal range   Variability: Fetal HR Variability: moderate (amplitude range 6 to 25 bpm)   Accels: Fetal HR Accelerations: greater  than/equal to 15 bpm, lasting at least 15 seconds   Decels: Fetal HR Decelerations: absent   Tracing Category:       Uterine Assessment   Method: Method: palpation, external tocotransducer   Frequency (min): Contraction Frequency (Minutes): 1-2   Ctx Count in 10 min: Contractions in 10 Minutes: 5-6   Duration:     Intensity: Contraction Intensity: mild by palpation   Intensity by IUPC:     Resting Tone: Uterine Resting Tone: soft by palpation   Resting Tone by IUPC:     [place] Units:       Laboratory Results: reviewed  Radiology Review: EFW 6-8 HC 8% BPP   Other Studies: n/a    Assessment/Plan       SGA (small for gestational age)    Gestational hypertension without significant proteinuria in third trimester    Admission for laboratory examination      Assessment & Plan    Assessment:  1.  Intrauterine pregnancy at 38w3d gestation with reactive fetal status.    2.  induction of labor  for SGA, gest hypertension  with unfavorable cervix  3.  Obstetrical history significant for is non-contributory.  4.  GBS status:   Strep Gp B JHONATHAN   Date Value Ref Range Status   2020 Negative Negative Final     Comment:     Centers for Disease Control and Prevention (CDC) and American Congress  of Obstetricians and Gynecologists (ACOG) guidelines for prevention of   group B streptococcal (GBS) disease specify co-collection of  a vaginal and rectal swab specimen to maximize sensitivity of GBS  detection. Per the CDC and ACOG, swabbing both the lower vagina and  rectum substantially increases the yield of detection compared with  sampling the vagina alone.  Penicillin G, ampicillin, or cefazolin are indicated for intrapartum  prophylaxis of  GBS colonization. Reflex susceptibility  testing should be performed prior to use of clindamycin only on GBS  isolates from penicillin-allergic women who are considered a high risk  for anaphylaxis. Treatment with vancomycin without additional testing  is warranted if  resistance to clindamycin is noted.         Plan:  1. fetal and uterine monitoring  continuously, cervical ripening with  Misoprostol, expectant management, labor augmentation  Pitocin and analgesia with  epidural  2. Plan of care has been reviewed with patient and SO  3.  Risks, benefits of treatment plan have been discussed.  4.  All questions have been answered.      Mekhi Kapoor MD  4/9/2020  09:37

## 2020-04-09 NOTE — PLAN OF CARE
Problem: Patient Care Overview  Goal: Individualization and Mutuality  Flowsheets (Taken 4/9/2020 1500)  How to Address Anxieties/Fears: Educate pt on all processes and procedures; kept pt up to date on POC  Patient Specific Interventions: epidural placed by anesthesia - pain well controlled; MERVAT in recovery; infant attempted BF in recovery  What Anxieties, Fears, Concerns, or Questions Do You Have About Your Care?: Epidural, pain  Patient Specific Preferences: Epidural for pain control, MERVAT in recovery, BF in recovery

## 2020-04-10 VITALS
DIASTOLIC BLOOD PRESSURE: 79 MMHG | BODY MASS INDEX: 36.08 KG/M2 | TEMPERATURE: 98.5 F | HEART RATE: 88 BPM | SYSTOLIC BLOOD PRESSURE: 124 MMHG | RESPIRATION RATE: 16 BRPM | HEIGHT: 59 IN | WEIGHT: 179 LBS

## 2020-04-10 LAB
BASOPHILS # BLD AUTO: 0.04 10*3/MM3 (ref 0–0.2)
BASOPHILS NFR BLD AUTO: 0.3 % (ref 0–1.5)
DEPRECATED RDW RBC AUTO: 42.9 FL (ref 37–54)
EOSINOPHIL # BLD AUTO: 0.19 10*3/MM3 (ref 0–0.4)
EOSINOPHIL NFR BLD AUTO: 1.4 % (ref 0.3–6.2)
ERYTHROCYTE [DISTWIDTH] IN BLOOD BY AUTOMATED COUNT: 13 % (ref 12.3–15.4)
HCT VFR BLD AUTO: 28.2 % (ref 34–46.6)
HGB BLD-MCNC: 9.5 G/DL (ref 12–15.9)
IMM GRANULOCYTES # BLD AUTO: 0.1 10*3/MM3 (ref 0–0.05)
IMM GRANULOCYTES NFR BLD AUTO: 0.7 % (ref 0–0.5)
LYMPHOCYTES # BLD AUTO: 1.31 10*3/MM3 (ref 0.7–3.1)
LYMPHOCYTES NFR BLD AUTO: 9.8 % (ref 19.6–45.3)
MCH RBC QN AUTO: 30.5 PG (ref 26.6–33)
MCHC RBC AUTO-ENTMCNC: 33.7 G/DL (ref 31.5–35.7)
MCV RBC AUTO: 90.7 FL (ref 79–97)
MONOCYTES # BLD AUTO: 0.93 10*3/MM3 (ref 0.1–0.9)
MONOCYTES NFR BLD AUTO: 7 % (ref 5–12)
NEUTROPHILS # BLD AUTO: 10.81 10*3/MM3 (ref 1.7–7)
NEUTROPHILS NFR BLD AUTO: 80.8 % (ref 42.7–76)
NRBC BLD AUTO-RTO: 0 /100 WBC (ref 0–0.2)
PLATELET # BLD AUTO: 191 10*3/MM3 (ref 140–450)
PMV BLD AUTO: 10.8 FL (ref 6–12)
RBC # BLD AUTO: 3.11 10*6/MM3 (ref 3.77–5.28)
WBC NRBC COR # BLD: 13.38 10*3/MM3 (ref 3.4–10.8)

## 2020-04-10 PROCEDURE — 99024 POSTOP FOLLOW-UP VISIT: CPT | Performed by: OBSTETRICS & GYNECOLOGY

## 2020-04-10 PROCEDURE — 25010000002 TDAP 5-2.5-18.5 LF-MCG/0.5 SUSPENSION: Performed by: OBSTETRICS & GYNECOLOGY

## 2020-04-10 PROCEDURE — 90471 IMMUNIZATION ADMIN: CPT | Performed by: OBSTETRICS & GYNECOLOGY

## 2020-04-10 PROCEDURE — 85025 COMPLETE CBC W/AUTO DIFF WBC: CPT | Performed by: OBSTETRICS & GYNECOLOGY

## 2020-04-10 PROCEDURE — 90715 TDAP VACCINE 7 YRS/> IM: CPT | Performed by: OBSTETRICS & GYNECOLOGY

## 2020-04-10 RX ORDER — HYDROCODONE BITARTRATE AND ACETAMINOPHEN 5; 325 MG/1; MG/1
1 TABLET ORAL EVERY 6 HOURS PRN
Qty: 20 TABLET | Refills: 0 | Status: SHIPPED | OUTPATIENT
Start: 2020-04-10 | End: 2020-05-22

## 2020-04-10 RX ORDER — IBUPROFEN 800 MG/1
800 TABLET ORAL EVERY 8 HOURS PRN
Qty: 50 TABLET | Refills: 2 | Status: SHIPPED | OUTPATIENT
Start: 2020-04-10 | End: 2020-05-22

## 2020-04-10 RX ORDER — LANOLIN ALCOHOL/MO/W.PET/CERES
325 CREAM (GRAM) TOPICAL
Qty: 30 TABLET | Refills: 1 | Status: SHIPPED | OUTPATIENT
Start: 2020-04-10 | End: 2020-05-22

## 2020-04-10 RX ADMIN — DOCUSATE SODIUM 100 MG: 100 CAPSULE, LIQUID FILLED ORAL at 02:04

## 2020-04-10 RX ADMIN — Medication 1 TABLET: at 08:32

## 2020-04-10 RX ADMIN — IBUPROFEN 800 MG: 800 TABLET, FILM COATED ORAL at 02:04

## 2020-04-10 RX ADMIN — DOCUSATE SODIUM 100 MG: 100 CAPSULE, LIQUID FILLED ORAL at 08:32

## 2020-04-10 RX ADMIN — HYDROCODONE BITARTRATE AND ACETAMINOPHEN 1 TABLET: 5; 325 TABLET ORAL at 03:03

## 2020-04-10 RX ADMIN — IBUPROFEN 800 MG: 800 TABLET, FILM COATED ORAL at 11:19

## 2020-04-10 RX ADMIN — FERROUS SULFATE TAB 325 MG (65 MG ELEMENTAL FE) 325 MG: 325 (65 FE) TAB at 08:32

## 2020-04-10 RX ADMIN — HYDROCODONE BITARTRATE AND ACETAMINOPHEN 1 TABLET: 5; 325 TABLET ORAL at 11:19

## 2020-04-10 RX ADMIN — TETANUS TOXOID, REDUCED DIPHTHERIA TOXOID AND ACELLULAR PERTUSSIS VACCINE, ADSORBED 0.5 ML: 5; 2.5; 8; 8; 2.5 SUSPENSION INTRAMUSCULAR at 15:25

## 2020-04-10 NOTE — DISCHARGE SUMMARY
Date of Discharge:  4/10/2020    Discharge Diagnosis: postpartum care immediately following delivery     Presenting Problem/History of Present Illness  Admission for laboratory examination [Z01.89]       Hospital Course  Patient is a 24 y.o. female  38w3d presented with scheduled induction.  For events surrounding her delivery please see delivery/op note.  Her postpartum course was uneventful and today PPD#1, she is ready for discharge.  She meets all milestones and criteria for discharge and instructions were reviewed and she voiced understanding.     Procedures Performed  Vaginal delivery        Consults:   Consults     No orders found from 3/10/2020 to 2020.          Pertinent Test Results: none     Condition on Discharge:  Stable     Discharge Disposition  Home or Self Care    Discharge Medications     Discharge Medications      New Medications      Instructions Start Date   ferrous sulfate 325 (65 FE) MG tablet   325 mg, Oral, Daily With Breakfast      HYDROcodone-acetaminophen 5-325 MG per tablet  Commonly known as:  NORCO   1 tablet, Oral, Every 6 Hours PRN      ibuprofen 800 MG tablet  Commonly known as:  ADVIL,MOTRIN   800 mg, Oral, Every 8 Hours PRN         Continue These Medications      Instructions Start Date   magnesium oxide 400 MG tablet  Commonly known as:  MAG-OX   400 mg, Oral, Daily      prenatal (CLASSIC) vitamin 28-0.8 MG tablet tablet   1 tablet, Oral, Daily      promethazine 12.5 MG tablet  Commonly known as:  PHENERGAN   12.5 mg, Oral, Every 6 Hours PRN             Discharge Diet:   Diet Instructions     Advance Diet As Tolerated            Activity at Discharge:   Activity Instructions     Pelvic Rest            Follow-up Appointments  No future appointments.  Additional Instructions for the Follow-ups that You Need to Schedule     Discharge Follow-up with Specialty: Dr. Kapoor; 6 Weeks   As directed      Specialty:  Dr. Kapoor    Follow Up:  6 Weeks               Test  Results Pending at Discharge       Cody Meeks MD  04/10/20  11:14

## 2020-04-10 NOTE — LACTATION NOTE
This note was copied from a baby's chart.  Mom reports she is only formula feeding now. She has no interest in pumping and bottle feeding.  Lactation Consult Note    Evaluation Completed: 4/10/2020 08:08  Patient Name: Xochitl Smith  :  2019  MRN:  [id number]     REFERRAL  INFORMATION:                                         DELIVERY HISTORY:  This patient has no babies on file.  This patient has no babies on file.  Skin to skin initiation date/time: 2020 1:24 PM  Skin to skin end date/time:      This patient has no babies on file.    MATERNAL ASSESSMENT:                               INFANT ASSESSMENT:  This patient has no babies on file.  This patient has no babies on file.  This patient has no babies on file.  This patient has no babies on file.  This patient has no babies on file.  This patient has no babies on file.  This patient has no babies on file.  This patient has no babies on file.  This patient has no babies on file.  This patient has no babies on file.  This patient has no babies on file.  This patient has no babies on file.  This patient has no babies on file.  This patient has no babies on file.  This patient has no babies on file.  This patient has no babies on file.  This patient has no babies on file.  This patient has no babies on file.  This patient has no babies on file.  This patient has no babies on file.      This patient has no babies on file.  This patient has no babies on file.  This patient has no babies on file.  This patient has no babies on file.  This patient has no babies on file.  This patient has no babies on file.    This patient has no babies on file.  This patient has no babies on file.  This patient has no babies on file.        MATERNAL INFANT FEEDING:                                                                       EQUIPMENT TYPE:                                 BREAST PUMPING:          LACTATION REFERRALS:

## 2020-04-10 NOTE — PROGRESS NOTES
Postpartum Progress Note      Status post Vaginal Delivery: Doing well postoperatively.     1) postpartum care immediately following delivery : Doing well, routine care. Discharge home.   2) Anemia postpartum, iron with vitamins at home.     Discussed and wishes for early discharge given COVID-19 pandemic.     Rh status: A positive   Rubella: immune  Gender: Male     Desires circumcision   R/B/A reviewed   Voiced understanding   Desires to proceed as planned     Subjective     Postpartum Day 1: Vaginal delivery    The patient feels well. The patient denies emotional concerns. Pain is well controlled with current medications. The baby is well. The patient is ambulating well. The patient is tolerating a normal diet.     Objective     Vital signs in last 24 hours:  Temp:  [97.7 °F (36.5 °C)-98.6 °F (37 °C)] 98.5 °F (36.9 °C)  Heart Rate:  [] 88  Resp:  [16-18] 16  BP: ()/(53-86) 124/79      General:    alert, appears stated age and cooperative   Abdomen:  Soft, Non-tender    Lochia:  appropriate   Uterine Fundus:   firm   Ext    Edema 1+   DVT Evaluation:  No evidence of DVT seen on physical exam.     Lab Results   Component Value Date    WBC 13.38 (H) 04/10/2020    HGB 9.5 (L) 04/10/2020    HCT 28.2 (L) 04/10/2020    MCV 90.7 04/10/2020     04/10/2020       Cody Meeks MD  4/10/2020  11:08

## 2020-05-22 ENCOUNTER — POSTPARTUM VISIT (OUTPATIENT)
Dept: OBSTETRICS AND GYNECOLOGY | Facility: CLINIC | Age: 25
End: 2020-05-22

## 2020-05-22 VITALS
SYSTOLIC BLOOD PRESSURE: 114 MMHG | WEIGHT: 171 LBS | BODY MASS INDEX: 34.47 KG/M2 | DIASTOLIC BLOOD PRESSURE: 72 MMHG | HEIGHT: 59 IN

## 2020-05-22 DIAGNOSIS — Z30.09 ENCOUNTER FOR OTHER GENERAL COUNSELING OR ADVICE ON CONTRACEPTION: ICD-10-CM

## 2020-05-22 PROBLEM — O99.019 IRON DEFICIENCY ANEMIA DURING PREGNANCY: Status: RESOLVED | Noted: 2020-02-05 | Resolved: 2020-05-22

## 2020-05-22 PROBLEM — O99.891 KIDNEY STONE COMPLICATING PREGNANCY, THIRD TRIMESTER: Status: RESOLVED | Noted: 2020-03-01 | Resolved: 2020-05-22

## 2020-05-22 PROBLEM — N20.0 KIDNEY STONE COMPLICATING PREGNANCY, THIRD TRIMESTER: Status: RESOLVED | Noted: 2020-03-01 | Resolved: 2020-05-22

## 2020-05-22 PROBLEM — O13.3 GESTATIONAL HYPERTENSION WITHOUT SIGNIFICANT PROTEINURIA IN THIRD TRIMESTER: Status: RESOLVED | Noted: 2020-04-08 | Resolved: 2020-05-22

## 2020-05-22 PROBLEM — Z34.90 PREGNANCY: Status: RESOLVED | Noted: 2020-03-01 | Resolved: 2020-05-22

## 2020-05-22 PROBLEM — O26.833 KIDNEY STONE COMPLICATING PREGNANCY, THIRD TRIMESTER: Status: RESOLVED | Noted: 2020-03-01 | Resolved: 2020-05-22

## 2020-05-22 PROBLEM — Z34.83 PRENATAL CARE, SUBSEQUENT PREGNANCY IN THIRD TRIMESTER: Status: RESOLVED | Noted: 2019-09-11 | Resolved: 2020-05-22

## 2020-05-22 PROBLEM — D50.9 IRON DEFICIENCY ANEMIA DURING PREGNANCY: Status: RESOLVED | Noted: 2020-02-05 | Resolved: 2020-05-22

## 2020-05-22 PROCEDURE — 99213 OFFICE O/P EST LOW 20 MIN: CPT | Performed by: OBSTETRICS & GYNECOLOGY

## 2020-05-22 NOTE — PROGRESS NOTES
Subjective   Fe Smith is a 25 y.o. female for postpartum follow-up    History of Present Illness    25-year-old white female  2 para 2 presents for 6-week postpartum follow-up.  She tried breast-feeding for about a week and has now switched to bottlefeeding which is going well.  She has yet to have a menstrual cycle.  She is interested in the Mirena for contraception.  She denies any symptoms of postpartum depression.   is doing well.  She has no complaints.    The following portions of the patient's history were reviewed and updated as appropriate: allergies, current medications, past family history, past medical history, past social history, past surgical history and problem list.    Review of Systems   Eyes: Negative for visual disturbance.   Cardiovascular: Negative for leg swelling.   Gastrointestinal: Negative for abdominal pain.   Genitourinary: Negative for difficulty urinating, pelvic pain, vaginal bleeding and vaginal discharge.   Neurological: Negative for headaches.       Objective   Physical Exam   The patient is alert and conversant and in no acute distress.  Breasts are without mass, tenderness, or discharge.  The abdomen is soft, flat and nontender.  No masses were palpable.  The vulva and perineum are without lesion.  The vagina is without bleeding or discharge.  The cervix is closed and without lesion and nontender to motion.  The uterus is anteverted and normal size, shape, and contour.  The adnexa are without mass or tenderness.  The rectovaginal septum is intact without induration or tenderness.  Extremities are without swelling or tenderness.    Assessment/Plan   Fe was seen today for postpartum follow-up.    Diagnoses and all orders for this visit:    Postpartum state  -     Pap IG, Ct-Ng, Rfx HPV ASCU    Encounter for other general counseling or advice on contraception    The patient is doing well.  She will resume normal activities.  An information booklet regarding  the Mirena was given.  She will call with the onset of her next menstrual cycle for insertion.

## 2020-05-28 LAB
C TRACH RRNA CVX QL NAA+PROBE: NEGATIVE
CONV .: ABNORMAL
CYTOLOGIST CVX/VAG CYTO: ABNORMAL
CYTOLOGY CVX/VAG DOC CYTO: ABNORMAL
CYTOLOGY CVX/VAG DOC THIN PREP: ABNORMAL
DX ICD CODE: ABNORMAL
DX ICD CODE: ABNORMAL
HIV 1 & 2 AB SER-IMP: ABNORMAL
HPV I/H RISK 1 DNA CVX QL PROBE+SIG AMP: POSITIVE
N GONORRHOEA RRNA CVX QL NAA+PROBE: NEGATIVE
OTHER STN SPEC: ABNORMAL
PATHOLOGIST CVX/VAG CYTO: ABNORMAL
RECOM F/U CVX/VAG CYTO: ABNORMAL
STAT OF ADQ CVX/VAG CYTO-IMP: ABNORMAL

## 2020-06-11 ENCOUNTER — TELEPHONE (OUTPATIENT)
Dept: OBSTETRICS AND GYNECOLOGY | Facility: CLINIC | Age: 25
End: 2020-06-11

## 2020-06-18 ENCOUNTER — TELEPHONE (OUTPATIENT)
Dept: OBSTETRICS AND GYNECOLOGY | Facility: CLINIC | Age: 25
End: 2020-06-18

## 2020-06-22 ENCOUNTER — PROCEDURE VISIT (OUTPATIENT)
Dept: OBSTETRICS AND GYNECOLOGY | Facility: CLINIC | Age: 25
End: 2020-06-22

## 2020-06-22 VITALS
WEIGHT: 165 LBS | SYSTOLIC BLOOD PRESSURE: 123 MMHG | DIASTOLIC BLOOD PRESSURE: 76 MMHG | HEIGHT: 59 IN | BODY MASS INDEX: 33.26 KG/M2

## 2020-06-22 DIAGNOSIS — Z30.430 ENCOUNTER FOR INSERTION OF MIRENA IUD: Primary | ICD-10-CM

## 2020-06-22 DIAGNOSIS — Z30.431 FAMILY PLANNING, IUD (INTRAUTERINE DEVICE) CHECK/REINSERTION/REMOVAL: Primary | ICD-10-CM

## 2020-06-22 PROCEDURE — 58300 INSERT INTRAUTERINE DEVICE: CPT | Performed by: OBSTETRICS & GYNECOLOGY

## 2020-06-22 PROCEDURE — 76830 TRANSVAGINAL US NON-OB: CPT | Performed by: OBSTETRICS & GYNECOLOGY

## 2020-06-22 NOTE — PROGRESS NOTES
Subjective   Fe Smith is a 25 y.o. female here for IUD insertion    History of Present Illness    25-year-old white female  2 para 2 presents for Mirena IUD.  She is currently on her menstrual cycle.  She has been using abstinence for contraception since her recent delivery.  She has given both verbal and written consent to IUD insertion.  She has read the information booklet.    The following portions of the patient's history were reviewed and updated as appropriate: allergies, current medications, past family history, past medical history, past social history, past surgical history and problem list.    Review of Systems   Genitourinary: Positive for vaginal bleeding. Negative for pelvic pain and vaginal discharge.       Objective   Physical Exam   In the supine position, the vulva and perineum were examined and found to be without lesion.  The speculum was placed and the cervix visualized.  The cervix was cleansed with Betadine and grasped with a single-tooth tenaculum.  The Mirena IUD was then inserted without difficulty to 8 cm.  The  was removed and the string was trimmed.  All instruments were then removed.  Hemostasis at the termination of the procedure was complete.  The patient tolerated the procedure well.    Assessment/Plan   Fe was seen today for procedure.    Diagnoses and all orders for this visit:    Encounter for insertion of mirena IUD  -     levonorgestrel (MIRENA) 20 MCG/24HR IUD    Post insertion instructions were explicit.  The patient will obtain a transvaginal ultrasound to document proper placement.  She will use barrier forms of contraception for at least 7 days.  She will call with any issues.

## 2021-04-16 ENCOUNTER — BULK ORDERING (OUTPATIENT)
Dept: CASE MANAGEMENT | Facility: OTHER | Age: 26
End: 2021-04-16

## 2021-04-16 DIAGNOSIS — Z23 IMMUNIZATION DUE: ICD-10-CM

## 2021-08-13 ENCOUNTER — HOSPITAL ENCOUNTER (EMERGENCY)
Facility: HOSPITAL | Age: 26
Discharge: HOME | End: 2021-08-13
Attending: EMERGENCY MEDICINE | Admitting: EMERGENCY MEDICINE

## 2021-08-13 ENCOUNTER — APPOINTMENT (OUTPATIENT)
Dept: ULTRASOUND IMAGING | Facility: HOSPITAL | Age: 26
End: 2021-08-13

## 2021-08-13 VITALS
RESPIRATION RATE: 16 BRPM | TEMPERATURE: 98.4 F | HEIGHT: 60 IN | DIASTOLIC BLOOD PRESSURE: 87 MMHG | OXYGEN SATURATION: 99 % | WEIGHT: 165 LBS | BODY MASS INDEX: 32.39 KG/M2 | HEART RATE: 71 BPM | SYSTOLIC BLOOD PRESSURE: 134 MMHG

## 2021-08-13 DIAGNOSIS — N93.9 VAGINAL BLEEDING: Primary | ICD-10-CM

## 2021-08-13 LAB
BACTERIA UR QL AUTO: ABNORMAL /HPF
BILIRUB UR QL STRIP: ABNORMAL
CLARITY UR: ABNORMAL
CLUE CELLS SPEC QL WET PREP: ABNORMAL
COLOR UR: ABNORMAL
GLUCOSE UR STRIP-MCNC: NEGATIVE MG/DL
HGB UR QL STRIP.AUTO: ABNORMAL
HYALINE CASTS UR QL AUTO: ABNORMAL /LPF
HYDATID CYST SPEC WET PREP: ABNORMAL
KETONES UR QL STRIP: ABNORMAL
KOH PREP NAIL: NORMAL
LEUKOCYTE ESTERASE UR QL STRIP.AUTO: ABNORMAL
MUCOUS THREADS URNS QL MICRO: ABNORMAL /HPF
NITRITE UR QL STRIP: NEGATIVE
PH UR STRIP.AUTO: 5.5 [PH] (ref 5–8)
PROT UR QL STRIP: ABNORMAL
RBC # UR: ABNORMAL /HPF
REF LAB TEST METHOD: ABNORMAL
SP GR UR STRIP: >=1.03 (ref 1–1.03)
SQUAMOUS #/AREA URNS HPF: ABNORMAL /HPF
T VAGINALIS SPEC QL WET PREP: ABNORMAL
UROBILINOGEN UR QL STRIP: ABNORMAL
WBC SPEC QL WET PREP: ABNORMAL
WBC UR QL AUTO: ABNORMAL /HPF
YEAST GENITAL QL WET PREP: ABNORMAL

## 2021-08-13 PROCEDURE — P9612 CATHETERIZE FOR URINE SPEC: HCPCS

## 2021-08-13 PROCEDURE — 81001 URINALYSIS AUTO W/SCOPE: CPT | Performed by: NURSE PRACTITIONER

## 2021-08-13 PROCEDURE — 87220 TISSUE EXAM FOR FUNGI: CPT | Performed by: NURSE PRACTITIONER

## 2021-08-13 PROCEDURE — 87210 SMEAR WET MOUNT SALINE/INK: CPT | Performed by: NURSE PRACTITIONER

## 2021-08-13 PROCEDURE — 87491 CHLMYD TRACH DNA AMP PROBE: CPT | Performed by: NURSE PRACTITIONER

## 2021-08-13 PROCEDURE — 87591 N.GONORRHOEAE DNA AMP PROB: CPT | Performed by: NURSE PRACTITIONER

## 2021-08-13 PROCEDURE — 99283 EMERGENCY DEPT VISIT LOW MDM: CPT

## 2021-08-13 PROCEDURE — 93976 VASCULAR STUDY: CPT

## 2021-08-13 PROCEDURE — 76856 US EXAM PELVIC COMPLETE: CPT

## 2021-08-13 PROCEDURE — 76830 TRANSVAGINAL US NON-OB: CPT

## 2021-08-13 NOTE — ED NOTES
Pt ambulatory to triage from home with c/o possible iud displacement. Pt states had discomfort during sex then pinching feeling afterwards.  Pt also c/o vaginal bleeding since incident.  Pt wearing mask in triage.  Triage personnel wore appropriate PPE       Kathleen Weinstein RN  08/13/21 5676

## 2021-08-14 NOTE — ED PROVIDER NOTES
Pt presents to the ED c/o  vaginal bleeding that began late yesterday after intercourse.  Symptoms have improved.  Had some mild lower abdominal cramping.  Currently reports feeling well.  History of Mirena.     On exam,   General: Awake, alert, no acute distress  HEENT: EOMI  Pulm: Symmetric chest rise, nonlabored breathing  CV: Regular rate and rhythm  GI: Non-distended  MSK: No deformity  Neuro: Alert and oriented x 3, moving all extremities, no focal deficits  Psych: Calm, cooperative    Vital signs and nursing notes reviewed.       Surgical mask, protective eye goggles, and gloves used during this encounter. Patient in surgical mask.      Plan:   ED Course as of Aug 13 2129   Fri Aug 13, 2021   1956 Pelvic exam completed by me with female chaperone. Swabs collected. Discussed with Cotton-PA-C.  The IUD string is visible, perhaps a bit long.      [EW]   2042 Pelvic ultrasound is unremarkable.  Given that the patient's symptoms are improving, feel the most appropriate course of care at this time would be abstinence until complete resolve and close follow-up with her GYN.  She is to return the emergency department should the bleeding change, worsen, or should she develop a fever.  We will call the patient should her [facility] labs be positive.    [RC]      ED Course User Index  [EW] Angle Arora APRN  [RC] Norm Palacio III, PA     No acute emergent abnormalities detected today, patient safe for discharge with close outpatient OB/GYN follow-up as needed.  ED return for worsening symptoms as needed.     Attestation:  The WINNIE and I have discussed this patient's history, physical exam, and treatment plan.  I have reviewed the documentation and personally had a face to face interaction with the patient. I affirm the documentation and agree with the treatment and plan.  The attached note describes my personal findings.          Som Dominguez MD  08/13/21 2130

## 2021-08-14 NOTE — DISCHARGE INSTRUCTIONS
Return to the emergency department she ran through more than a pad an hour for greater than 5 hours, should you develop a fever, should you have any further concerns.  Recommend abstinence till complete resolve your symptoms.  Recommend following up with Dr. Mcdaniels above for further evaluation.

## 2021-08-14 NOTE — ED PROVIDER NOTES
EMERGENCY DEPARTMENT ENCOUNTER    Room Number:  A01/01  Date of encounter:  2021  PCP: Provider, No Known  Historian: Patient      HPI:  Chief Complaint: Possibly  A complete HPI/ROS/PMH/PSH/SH/FH are unobtainable due to: Nothing    Context: Fe Smith is a 26 y.o. female who presents to the ED c/o vaginal bleeding that began late yesterday evening after intercourse.  Per the patient the blood was initially, brownish in color and appeared old.  By this afternoon the bleeding had become more bright red in nature.  However as the days progressed the bleeding seems to have slowed down and is now scant and more of a brownish color again.  Patient estimates using 1-2 pads over the last 24 hours.  She states there is some associated cramping that is currently mild in nature.  She is not anticoagulated or antiplatelet therapy.  She is here for further evaluation      PAST MEDICAL HISTORY  Active Ambulatory Problems     Diagnosis Date Noted   • Other constipation 2019   • Migraine without aura and without status migrainosus, not intractable 2019   • Admission for laboratory examination 2020   •  (normal spontaneous vaginal delivery) 2020   • Postpartum state 2020     Resolved Ambulatory Problems     Diagnosis Date Noted   • Pregnancy 2016   • Rubella non-immune status, antepartum 10/05/2016   • Back pain affecting pregnancy in third trimester 2016   • Encounter for supervision of normal first pregnancy in third trimester 2016   • Prenatal care, subsequent pregnancy in third trimester 2019   • Nausea and vomiting in pregnancy 2019   • Iron deficiency anemia during pregnancy 2020   • Pregnancy 2020   • Kidney stone complicating pregnancy, third trimester 2020   • SGA (small for gestational age) 2020   • Gestational hypertension without significant proteinuria in third trimester 2020     Past Medical History:   Diagnosis Date    • Abnormal Pap smear of cervix    • Chlamydia    • Gestational hypertension    • Migraine          PAST SURGICAL HISTORY  Past Surgical History:   Procedure Laterality Date   • WISDOM TOOTH EXTRACTION           FAMILY HISTORY  No family history on file.      SOCIAL HISTORY  Social History     Socioeconomic History   • Marital status: Single     Spouse name: Not on file   • Number of children: Not on file   • Years of education: Not on file   • Highest education level: Not on file   Tobacco Use   • Smoking status: Never Smoker   Substance and Sexual Activity   • Alcohol use: No   • Drug use: No   • Sexual activity: Yes         ALLERGIES  Patient has no known allergies.        REVIEW OF SYSTEMS  Review of Systems   Constitutional: Negative for chills and fever.   HENT: Negative.    Respiratory: Negative for cough and shortness of breath.    Cardiovascular: Negative for chest pain and leg swelling.   Gastrointestinal: Negative for abdominal pain.   Genitourinary: Positive for pelvic pain and vaginal bleeding.   Musculoskeletal: Negative.    Skin: Negative.    Neurological: Negative for dizziness and light-headedness.        All systems reviewed and negative except for those discussed in HPI.       PHYSICAL EXAM    I have reviewed the triage vital signs and nursing notes.    ED Triage Vitals [08/13/21 1922]   Temp Heart Rate Resp BP SpO2   98.4 °F (36.9 °C) 71 16 134/87 99 %      Temp src Heart Rate Source Patient Position BP Location FiO2 (%)   Temporal Monitor Standing Right arm --       Physical Exam  GENERAL: WDWN female no acute distress.  HENT: nares patent  EYES: no scleral icterus  CV: regular rhythm, regular rate, no rubs or gallops  RESPIRATORY: normal effort  ABDOMEN: soft, nontender  : See Angle Arora's pelvic exam notes  MUSCULOSKELETAL: no deformity  NEURO: alert, moves all extremities, follows commands  SKIN: warm, dry        LAB RESULTS  Recent Results (from the past 24 hour(s))   KOH Prep - Swab,  Vagina    Collection Time: 08/13/21  8:06 PM    Specimen: Vagina; Swab   Result Value Ref Range    KOH Prep No yeast or hyphal elements seen No yeast or hyphal elements seen   Wet Prep, Genital - Swab, Vagina    Collection Time: 08/13/21  8:06 PM    Specimen: Vagina; Swab   Result Value Ref Range    YEAST No yeast seen No yeast seen    HYPHAL ELEMENTS No Hyphal elements seen No Hyphal elements seen    WBC'S 1+ WBC's seen (A) No WBC's seen    Clue Cells, Wet Prep No Clue cells seen No Clue cells seen    Trichomonas, Wet Prep No Trichomonas seen No Trichomonas seen   Urinalysis With Culture If Indicated - Urine, Catheter    Collection Time: 08/13/21  8:07 PM    Specimen: Urine, Catheter   Result Value Ref Range    Color, UA Dark Yellow (A) Yellow, Straw    Appearance, UA Cloudy (A) Clear    pH, UA 5.5 5.0 - 8.0    Specific Gravity, UA >=1.030 1.005 - 1.030    Glucose, UA Negative Negative    Ketones, UA 40 mg/dL (2+) (A) Negative    Bilirubin, UA Small (1+) (A) Negative    Blood, UA Trace (A) Negative    Protein, UA Trace (A) Negative    Leuk Esterase, UA Trace (A) Negative    Nitrite, UA Negative Negative    Urobilinogen, UA 1.0 E.U./dL 0.2 - 1.0 E.U./dL   Urinalysis, Microscopic Only - Urine, Catheter    Collection Time: 08/13/21  8:07 PM    Specimen: Urine, Catheter   Result Value Ref Range    RBC, UA None Seen None Seen, 0-2 /HPF    WBC, UA 3-5 (A) None Seen, 0-2 /HPF    Bacteria, UA None Seen None Seen /HPF    Squamous Epithelial Cells, UA 13-20 (A) None Seen, 0-2 /HPF    Hyaline Casts, UA 0-2 None Seen /LPF    Mucus, UA Moderate/2+ (A) None Seen, Trace /HPF    Methodology Manual Light Microscopy        Ordered the above labs and independently reviewed the results.        RADIOLOGY  US Pelvis Complete, US Non-ob Transvaginal, US Testicular or Ovarian Vascular Limited    Result Date: 8/13/2021  US PELVIS COMPLETE-, US TESTICULAR OR OVARIAN VASCULAR LIMITED-, US NON-OB TRANSVAGINAL-  INDICATIONS: Vaginal bleeding  after intercourse. Intrauterine device.  TECHNIQUE: Transabdominal and endovaginal pelvic ultrasound with Doppler assessment of the ovaries  COMPARISON: None available  FINDINGS:  The uterus measures 6.9 x 3.2 x 5.1 cm. Intrauterine device is seen in its expected location in the endometrial canal. Endometrial thickness was measured at 2 mm. The cervix appears unremarkable.  The ovaries show normal vascularity and appear unremarkable. The right ovary measures 2.4 x 1.6 x 1.7 cm. Left ovary measures 2.9 x 1.3 x 2.3 cm.  No significant pelvic free fluid.       Unremarkable pelvic ultrasound with normally positioned intrauterine device.  This report was finalized on 8/13/2021 9:01 PM by Dr. Ryan Jade M.D.        I ordered the above noted radiological studies. Reviewed by me and discussed with radiologist.  See dictation for official radiology interpretation.      PROCEDURES    Procedures      MEDICATIONS GIVEN IN ER    Medications - No data to display      PROGRESS, DATA ANALYSIS, CONSULTS, AND MEDICAL DECISION MAKING    All labs have been independently reviewed by me.  All radiology studies have been reviewed by me and discussed with radiologist dictating the report.   EKG's independently viewed and interpreted by me.  Discussion below represents my analysis of pertinent findings related to patient's condition, differential diagnosis, treatment plan and final disposition.    DDx includes but is not limited to: Vaginal bleeding secondary to laceration, vaginal bleeding secondary to IUD/perforation, vaginal bleeding secondary to menses, vaginal bleeding secondary to STD.  Further evaluate patient will order a UA, wet prep, KOH prep, GNC studies.  We will also order a pelvic ultrasound to further rule out perforation.  There were no vaginal lacerations noticed Angle Arora's exam.  Please see below for MDM course of care.    ED Course as of Aug 13 2125   Fri Aug 13, 2021   1956 Pelvic exam completed by me with  female chaperone. Swabs collected. Discussed with Cotton-PA-C.  The IUD string is visible, perhaps a bit long.      [EW]   2042 Pelvic ultrasound is unremarkable.  Given that the patient's symptoms are improving, feel the most appropriate course of care at this time would be abstinence until complete resolve and close follow-up with her GYN.  She is to return the emergency department should the bleeding change, worsen, or should she develop a fever.  We will call the patient should her C labs be positive.    [RC]      ED Course User Index  [EW] Angle Arora APRN  [RC] Norm Palacio III, PA       Patient was placed in face mask in first look. Patient was wearing facemask when I entered the room and throughout our encounter. I wore full protective equipment throughout this patient encounter including a face mask, and gloves. Hand hygiene was performed before donning protective equipment and after removal when leaving the room.    AS OF 21:25 EDT VITALS:    BP - 134/87  HR - 71  TEMP - 98.4 °F (36.9 °C) (Temporal)  O2 SATS - 99%        DIAGNOSIS  Final diagnoses:   Vaginal bleeding         DISPOSITION  Patient was placed in face mask in first look. Patient was wearing facemask when I entered the room and throughout our encounter. I wore full protective equipment throughout this patient encounter including a face mask, and gloves. Hand hygiene was performed before donning protective equipment and after removal when leaving the room.           Norm Palacio III, PA  08/13/21 2125

## 2021-08-17 LAB
C TRACH RRNA SPEC QL NAA+PROBE: NEGATIVE
N GONORRHOEA RRNA SPEC QL NAA+PROBE: NEGATIVE

## 2021-08-25 ENCOUNTER — TELEPHONE (OUTPATIENT)
Dept: OBSTETRICS AND GYNECOLOGY | Facility: CLINIC | Age: 26
End: 2021-08-25

## 2021-09-11 ENCOUNTER — APPOINTMENT (OUTPATIENT)
Dept: GENERAL RADIOLOGY | Facility: HOSPITAL | Age: 26
End: 2021-09-11

## 2021-09-11 ENCOUNTER — HOSPITAL ENCOUNTER (EMERGENCY)
Facility: HOSPITAL | Age: 26
Discharge: HOME | End: 2021-09-11
Attending: EMERGENCY MEDICINE | Admitting: EMERGENCY MEDICINE

## 2021-09-11 VITALS
HEIGHT: 64 IN | HEART RATE: 86 BPM | RESPIRATION RATE: 16 BRPM | SYSTOLIC BLOOD PRESSURE: 139 MMHG | TEMPERATURE: 97.7 F | OXYGEN SATURATION: 100 % | BODY MASS INDEX: 28.17 KG/M2 | DIASTOLIC BLOOD PRESSURE: 95 MMHG | WEIGHT: 165 LBS
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
## 2021-09-11 DIAGNOSIS — S93.402A SPRAIN OF LEFT ANKLE, UNSPECIFIED LIGAMENT, INITIAL ENCOUNTER: Primary | ICD-10-CM

## 2021-09-11 PROCEDURE — 99283 EMERGENCY DEPT VISIT LOW MDM: CPT

## 2021-09-11 PROCEDURE — 73630 X-RAY EXAM OF FOOT: CPT

## 2021-09-11 PROCEDURE — 73610 X-RAY EXAM OF ANKLE: CPT
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
## 2021-12-19 ENCOUNTER — APPOINTMENT (OUTPATIENT)
Dept: GENERAL RADIOLOGY | Facility: HOSPITAL | Age: 26
End: 2021-12-19

## 2021-12-19 ENCOUNTER — HOSPITAL ENCOUNTER (EMERGENCY)
Facility: HOSPITAL | Age: 26
Discharge: HOME | End: 2021-12-19
Attending: EMERGENCY MEDICINE | Admitting: EMERGENCY MEDICINE

## 2021-12-19 ENCOUNTER — APPOINTMENT (OUTPATIENT)
Dept: CT IMAGING | Facility: HOSPITAL | Age: 26
End: 2021-12-19

## 2021-12-19 VITALS
SYSTOLIC BLOOD PRESSURE: 131 MMHG | DIASTOLIC BLOOD PRESSURE: 76 MMHG | TEMPERATURE: 99.3 F | HEART RATE: 97 BPM | OXYGEN SATURATION: 97 % | RESPIRATION RATE: 20 BRPM

## 2021-12-19 DIAGNOSIS — J02.0 STREP THROAT: Primary | ICD-10-CM

## 2021-12-19 LAB
ALBUMIN SERPL-MCNC: 4.2 G/DL (ref 3.5–5.2)
ALBUMIN/GLOB SERPL: 1 G/DL
ALP SERPL-CCNC: 71 U/L (ref 39–117)
ALT SERPL W P-5'-P-CCNC: 10 U/L (ref 1–33)
ANION GAP SERPL CALCULATED.3IONS-SCNC: 13.8 MMOL/L (ref 5–15)
AST SERPL-CCNC: 14 U/L (ref 1–32)
BASOPHILS # BLD AUTO: 0.03 10*3/MM3 (ref 0–0.2)
BASOPHILS NFR BLD AUTO: 0.3 % (ref 0–1.5)
BILIRUB SERPL-MCNC: 0.4 MG/DL (ref 0–1.2)
BUN SERPL-MCNC: 10 MG/DL (ref 6–20)
BUN/CREAT SERPL: 18.2 (ref 7–25)
CALCIUM SPEC-SCNC: 9.8 MG/DL (ref 8.6–10.5)
CHLORIDE SERPL-SCNC: 102 MMOL/L (ref 98–107)
CO2 SERPL-SCNC: 27.2 MMOL/L (ref 22–29)
CREAT SERPL-MCNC: 0.55 MG/DL (ref 0.57–1)
D-LACTATE SERPL-SCNC: 1 MMOL/L (ref 0.5–2)
DEPRECATED RDW RBC AUTO: 38.9 FL (ref 37–54)
EOSINOPHIL # BLD AUTO: 0.06 10*3/MM3 (ref 0–0.4)
EOSINOPHIL NFR BLD AUTO: 0.5 % (ref 0.3–6.2)
ERYTHROCYTE [DISTWIDTH] IN BLOOD BY AUTOMATED COUNT: 12.2 % (ref 12.3–15.4)
FLUAV AG NPH QL: NEGATIVE
FLUBV AG NPH QL IA: NEGATIVE
GFR SERPL CREATININE-BSD FRML MDRD: 135 ML/MIN/1.73
GLOBULIN UR ELPH-MCNC: 4.2 GM/DL
GLUCOSE SERPL-MCNC: 103 MG/DL (ref 65–99)
HCG SERPL QL: NEGATIVE
HCT VFR BLD AUTO: 40.5 % (ref 34–46.6)
HETEROPH AB SER QL LA: NEGATIVE
HGB BLD-MCNC: 14 G/DL (ref 12–15.9)
IMM GRANULOCYTES # BLD AUTO: 0.05 10*3/MM3 (ref 0–0.05)
IMM GRANULOCYTES NFR BLD AUTO: 0.5 % (ref 0–0.5)
LYMPHOCYTES # BLD AUTO: 1.61 10*3/MM3 (ref 0.7–3.1)
LYMPHOCYTES NFR BLD AUTO: 14.7 % (ref 19.6–45.3)
MCH RBC QN AUTO: 30.5 PG (ref 26.6–33)
MCHC RBC AUTO-ENTMCNC: 34.6 G/DL (ref 31.5–35.7)
MCV RBC AUTO: 88.2 FL (ref 79–97)
MONOCYTES # BLD AUTO: 0.82 10*3/MM3 (ref 0.1–0.9)
MONOCYTES NFR BLD AUTO: 7.5 % (ref 5–12)
NEUTROPHILS NFR BLD AUTO: 76.5 % (ref 42.7–76)
NEUTROPHILS NFR BLD AUTO: 8.39 10*3/MM3 (ref 1.7–7)
NRBC BLD AUTO-RTO: 0 /100 WBC (ref 0–0.2)
PLATELET # BLD AUTO: 208 10*3/MM3 (ref 140–450)
PMV BLD AUTO: 10.9 FL (ref 6–12)
POTASSIUM SERPL-SCNC: 3.3 MMOL/L (ref 3.5–5.2)
PROCALCITONIN SERPL-MCNC: 0.07 NG/ML (ref 0–0.25)
PROT SERPL-MCNC: 8.4 G/DL (ref 6–8.5)
RBC # BLD AUTO: 4.59 10*6/MM3 (ref 3.77–5.28)
S PYO AG THROAT QL: POSITIVE
SARS-COV-2 RNA RESP QL NAA+PROBE: NOT DETECTED
SODIUM SERPL-SCNC: 143 MMOL/L (ref 136–145)
TROPONIN T SERPL-MCNC: <0.01 NG/ML (ref 0–0.03)
WBC NRBC COR # BLD: 10.96 10*3/MM3 (ref 3.4–10.8)

## 2021-12-19 PROCEDURE — 96365 THER/PROPH/DIAG IV INF INIT: CPT

## 2021-12-19 PROCEDURE — 93010 ELECTROCARDIOGRAM REPORT: CPT | Performed by: INTERNAL MEDICINE

## 2021-12-19 PROCEDURE — 85025 COMPLETE CBC W/AUTO DIFF WBC: CPT | Performed by: EMERGENCY MEDICINE

## 2021-12-19 PROCEDURE — 84484 ASSAY OF TROPONIN QUANT: CPT | Performed by: EMERGENCY MEDICINE

## 2021-12-19 PROCEDURE — 99283 EMERGENCY DEPT VISIT LOW MDM: CPT

## 2021-12-19 PROCEDURE — 96375 TX/PRO/DX INJ NEW DRUG ADDON: CPT

## 2021-12-19 PROCEDURE — 87880 STREP A ASSAY W/OPTIC: CPT | Performed by: EMERGENCY MEDICINE

## 2021-12-19 PROCEDURE — 84703 CHORIONIC GONADOTROPIN ASSAY: CPT | Performed by: EMERGENCY MEDICINE

## 2021-12-19 PROCEDURE — 87040 BLOOD CULTURE FOR BACTERIA: CPT | Performed by: EMERGENCY MEDICINE

## 2021-12-19 PROCEDURE — U0003 INFECTIOUS AGENT DETECTION BY NUCLEIC ACID (DNA OR RNA); SEVERE ACUTE RESPIRATORY SYNDROME CORONAVIRUS 2 (SARS-COV-2) (CORONAVIRUS DISEASE [COVID-19]), AMPLIFIED PROBE TECHNIQUE, MAKING USE OF HIGH THROUGHPUT TECHNOLOGIES AS DESCRIBED BY CMS-2020-01-R: HCPCS | Performed by: EMERGENCY MEDICINE

## 2021-12-19 PROCEDURE — 80053 COMPREHEN METABOLIC PANEL: CPT | Performed by: EMERGENCY MEDICINE

## 2021-12-19 PROCEDURE — 25010000002 DEXAMETHASONE PER 1 MG: Performed by: EMERGENCY MEDICINE

## 2021-12-19 PROCEDURE — 83605 ASSAY OF LACTIC ACID: CPT | Performed by: EMERGENCY MEDICINE

## 2021-12-19 PROCEDURE — 96366 THER/PROPH/DIAG IV INF ADDON: CPT

## 2021-12-19 PROCEDURE — 86308 HETEROPHILE ANTIBODY SCREEN: CPT | Performed by: EMERGENCY MEDICINE

## 2021-12-19 PROCEDURE — 84145 PROCALCITONIN (PCT): CPT | Performed by: EMERGENCY MEDICINE

## 2021-12-19 PROCEDURE — 70491 CT SOFT TISSUE NECK W/DYE: CPT

## 2021-12-19 PROCEDURE — 71045 X-RAY EXAM CHEST 1 VIEW: CPT

## 2021-12-19 PROCEDURE — 93005 ELECTROCARDIOGRAM TRACING: CPT | Performed by: EMERGENCY MEDICINE

## 2021-12-19 PROCEDURE — 25010000002 IOPAMIDOL 61 % SOLUTION: Performed by: EMERGENCY MEDICINE

## 2021-12-19 PROCEDURE — 96361 HYDRATE IV INFUSION ADD-ON: CPT

## 2021-12-19 PROCEDURE — 87804 INFLUENZA ASSAY W/OPTIC: CPT | Performed by: EMERGENCY MEDICINE

## 2021-12-19 PROCEDURE — 36415 COLL VENOUS BLD VENIPUNCTURE: CPT

## 2021-12-19 PROCEDURE — 0 AMPICILLIN-SULBACTAM PER 1.5 G: Performed by: EMERGENCY MEDICINE

## 2021-12-19 RX ORDER — ACETAMINOPHEN 500 MG
1000 TABLET ORAL ONCE
Status: COMPLETED | OUTPATIENT
Start: 2021-12-19 | End: 2021-12-19

## 2021-12-19 RX ORDER — DEXAMETHASONE SODIUM PHOSPHATE 10 MG/ML
6 INJECTION INTRAMUSCULAR; INTRAVENOUS ONCE
Status: COMPLETED | OUTPATIENT
Start: 2021-12-19 | End: 2021-12-19

## 2021-12-19 RX ORDER — PREDNISONE 20 MG/1
40 TABLET ORAL DAILY
Qty: 6 TABLET | Refills: 0 | Status: SHIPPED | OUTPATIENT
Start: 2021-12-19 | End: 2021-12-22

## 2021-12-19 RX ORDER — IBUPROFEN 800 MG/1
800 TABLET ORAL EVERY 8 HOURS PRN
Qty: 30 TABLET | Refills: 0 | Status: SHIPPED | OUTPATIENT
Start: 2021-12-19

## 2021-12-19 RX ORDER — ALUMINA, MAGNESIA, AND SIMETHICONE 2400; 2400; 240 MG/30ML; MG/30ML; MG/30ML
15 SUSPENSION ORAL ONCE
Status: COMPLETED | OUTPATIENT
Start: 2021-12-19 | End: 2021-12-19

## 2021-12-19 RX ORDER — LIDOCAINE HYDROCHLORIDE 20 MG/ML
15 SOLUTION OROPHARYNGEAL ONCE
Status: COMPLETED | OUTPATIENT
Start: 2021-12-19 | End: 2021-12-19

## 2021-12-19 RX ORDER — AMOXICILLIN AND CLAVULANATE POTASSIUM 875; 125 MG/1; MG/1
1 TABLET, FILM COATED ORAL EVERY 12 HOURS
Qty: 14 TABLET | Refills: 0 | Status: SHIPPED | OUTPATIENT
Start: 2021-12-19 | End: 2021-12-26

## 2021-12-19 RX ADMIN — SODIUM CHLORIDE 1000 ML: 9 INJECTION, SOLUTION INTRAVENOUS at 16:24

## 2021-12-19 RX ADMIN — LIDOCAINE HYDROCHLORIDE 15 ML: 20 SOLUTION ORAL; TOPICAL at 18:25

## 2021-12-19 RX ADMIN — SODIUM CHLORIDE 3 G: 9 INJECTION, SOLUTION INTRAVENOUS at 16:30

## 2021-12-19 RX ADMIN — IOPAMIDOL 85 ML: 612 INJECTION, SOLUTION INTRAVENOUS at 18:07

## 2021-12-19 RX ADMIN — ACETAMINOPHEN 1000 MG: 500 TABLET ORAL at 16:22

## 2021-12-19 RX ADMIN — PHENOL 2 SPRAY: 1.5 LIQUID ORAL at 18:26

## 2021-12-19 RX ADMIN — MAGNESIUM HYDROXIDE,ALUMINUM HYDROXICE,SIMETHICONE 15 ML: 240; 2400; 2400 SUSPENSION ORAL at 18:24

## 2021-12-19 RX ADMIN — DEXAMETHASONE SODIUM PHOSPHATE 6 MG: 10 INJECTION, SOLUTION INTRAMUSCULAR; INTRAVENOUS at 16:22

## 2021-12-19 NOTE — CASE MANAGEMENT/SOCIAL WORK
Discharge Planning Assessment  [facility]     Patient Name: Fe Smith  MRN: [id number]  Today's Date: 12/19/2021    Admit Date: 12/19/2021     Discharge Needs Assessment    No documentation.                Discharge Plan     Row Name 12/19/21 1601       Plan    Plan Comments Face sheet verified. Med assist contacted and voice message left for pt. Good Rx card given to pt.,              Continued Care and Services - Admitted Since 12/19/2021    Coordination has not been started for this encounter.          Demographic Summary    No documentation.                Functional Status    No documentation.                Psychosocial    No documentation.                Abuse/Neglect    No documentation.                Legal    No documentation.                Substance Abuse    No documentation.                Patient Forms    No documentation.                   Mary Martin RN

## 2021-12-19 NOTE — DISCHARGE INSTRUCTIONS
New toothbrush in 24 hours    Drink a lot of fluids    Ibuprofen every 6-8 hours as needed for pain    Can use throat spray    Although you are being discharged from the ED today, I encourage you to return for worsening symptoms. Things can, and do, change such that treatment at home with medication may not be adequate. Specifically I recommend returning for chest pain or discomfort, difficulty breathing, persistent vomiting or difficulty holding down liquids or medications, fever > 102.0 F, or any other worsening or alarming symptoms.     Rest. Drink plenty of fluids.  Follow up with PCP or provider listed for further evaluation and management.  Follow up with primary care provider for further management and to have blood pressure rechecked.  Take all medications as prescribed.

## 2021-12-19 NOTE — ED NOTES
Contacted distribution at this time for chloraseptic spray. Awaiting medication to be sent to tube station #15 at this time.     Lu Goode RN  12/19/21 0589

## 2021-12-19 NOTE — ED NOTES
All triage performed with this RN wearing appropriate PPE.  Pt placed in mask upon arrival to ED.  Patient c/o sore throat, fever, and vomiting since Thursday.      Lea Perdomo, RN  12/19/21 4078

## 2021-12-19 NOTE — ED PROVIDER NOTES
EMERGENCY DEPARTMENT ENCOUNTER  Patient was placed in face mask in first look and the following protective measures were taken unless  documented below in the ED course. Patient was wearing facemask when I entered the room and throughout our encounter. I wore full protective equipment throughout this patient encounter including a N95 mask, eye shield, gown and gloves. Hand hygiene was performed before donning protective equipment and after removal when leaving the room.    Room Number:    Date of encounter:  2021  PCP: Provider, No Known    HPI:  Context: Fe Smith is a 26 y.o. female who presents to the ED c/o chief complaint of sore throat.  Patient complains of sore throat since Thursday.  Patient complains of pain with swallowing, but is able to swallow.  Patient denies any voice change.  Patient denies any difficulty turning her neck.  Patient denies any cough, no runny nose cough or congestion.  Patient reports that she did have vomiting yesterday, no vomiting today.  Emesis nonbloody nonbilious.  Patient denies any abdominal pain, no diarrhea, no urinary symptoms.  Patient reports subjective fevers and chills, no night sweats.  Patient denies any loss of sense of smell or taste, does endorse shortness of breath, shortness of breath occasional, coming and going.  Patient denies any chest pain.  No recent sick contacts, no known exposure to COVID-19, patient is not been vaccinated against COVID-19.  Patient denies any chronic medical problems, 9 medications.      MEDICAL HISTORY REVIEW  Reviewed in EPIC    PAST MEDICAL HISTORY  Active Ambulatory Problems     Diagnosis Date Noted   • Other constipation 2019   • Migraine without aura and without status migrainosus, not intractable 2019   • Admission for laboratory examination 2020   •  (normal spontaneous vaginal delivery) 2020   • Postpartum state 2020     Resolved Ambulatory Problems     Diagnosis Date Noted   •  Pregnancy 08/08/2016   • Rubella non-immune status, antepartum 10/05/2016   • Back pain affecting pregnancy in third trimester 11/02/2016   • Encounter for supervision of normal first pregnancy in third trimester 12/21/2016   • Prenatal care, subsequent pregnancy in third trimester 09/11/2019   • Nausea and vomiting in pregnancy 09/11/2019   • Iron deficiency anemia during pregnancy 02/05/2020   • Pregnancy 03/01/2020   • Kidney stone complicating pregnancy, third trimester 03/01/2020   • SGA (small for gestational age) 04/01/2020   • Gestational hypertension without significant proteinuria in third trimester 04/08/2020     Past Medical History:   Diagnosis Date   • Abnormal Pap smear of cervix    • Chlamydia    • Gestational hypertension    • Migraine        PAST SURGICAL HISTORY  Past Surgical History:   Procedure Laterality Date   • WISDOM TOOTH EXTRACTION         FAMILY HISTORY  No family history on file.    SOCIAL HISTORY  Social History     Socioeconomic History   • Marital status: Single   Tobacco Use   • Smoking status: Never Smoker   Substance and Sexual Activity   • Alcohol use: No   • Drug use: No   • Sexual activity: Yes       ALLERGIES  Patient has no known allergies.    The patient's allergies have been reviewed    REVIEW OF SYSTEMS  All systems reviewed and negative except for those discussed in HPI.     PHYSICAL EXAM  I have reviewed the triage vital signs and nursing notes.  ED Triage Vitals [12/19/21 1528]   Temp Heart Rate Resp BP SpO2   99.3 °F (37.4 °C) (!) 150 20 -- 99 %      Temp src Heart Rate Source Patient Position BP Location FiO2 (%)   Tympanic Monitor -- -- --       General: No acute distress.  HENT: NCAT, PERRL, Nares patent.  Pharyngeal erythema with significant tonsillar swelling with exudates, right greater than left, uvular crowding, uvula is still midline although slightly depressed to the left.  Voice is normal, no difficulty handling secretions.  Eyes: no scleral icterus.  Neck:  trachea midline, no ROM limitations.  Tender cervical lymphadenopathy, worse on the right.  CV: regular rhythm, tachycardic rate.  Respiratory: normal effort, CTAB.  Abdomen: soft, nondistended, NTTP, no rebound tenderness, no guarding or rigidity.  : deferred.  Musculoskeletal: no deformity.  Neuro: alert, moves all extremities, follows commands.  Skin: warm, dry.    LAB RESULTS  No results found for this or any previous visit (from the past 24 hour(s)).    I ordered the above labs and reviewed the results.    RADIOLOGY  No Radiology Exams Resulted Within Past 24 Hours    I ordered the above noted radiological studies. I reviewed the images and results. I agree with the radiologist interpretation.    PROCEDURES  Procedures    MEDICATIONS GIVEN IN ER  Medications   acetaminophen (TYLENOL) tablet 1,000 mg (1,000 mg Oral Given 12/19/21 1622)   sodium chloride 0.9 % bolus 1,000 mL (0 mL Intravenous Stopped 12/19/21 1746)   phenol (CHLORASEPTIC) 1.4 % liquid 2 spray (2 sprays Mouth/Throat Given 12/19/21 1826)   dexamethasone (DECADRON) injection 6 mg (6 mg Intravenous Given 12/19/21 1622)   ampicillin-sulbactam (UNASYN) 3 g in sodium chloride 0.9 % 100 mL IVPB-VTB (0 g Intravenous Stopped 12/19/21 1857)   aluminum-magnesium hydroxide-simethicone (MAALOX MAX) 400-400-40 MG/5ML suspension 15 mL (15 mL Oral Given 12/19/21 1824)   Lidocaine Viscous HCl (XYLOCAINE) 2 % solution 15 mL (15 mL Mouth/Throat Given 12/19/21 1825)   iopamidol (ISOVUE-300) 61 % injection 100 mL (85 mL Intravenous Given 12/19/21 1807)       PROGRESS, DATA ANALYSIS, CONSULTS, AND MEDICAL DECISION MAKING  A complete history and physical exam have been performed.  All available laboratory and imaging results have been reviewed by myself prior to disposition.    MDM  After the initial H&P, I discussed pertinent information from history and physical exam with patient/family.  Discussed differential diagnosis.  Discussed plan for ED  "evaluation/work-up/treatment.  All questions answered.  Patient/family is agreeable with plan.  ED Course as of 12/20/21 2002   Sun Dec 19, 2021   1630 Patient presents with sore throat, significant tonsillar swelling with exudate, concern for possible peritonsillar abscess, obtaining CT imaging.  Patient is extremely tachycardic, obtaining septic work-up including blood cultures and lactic acid.  Patient complains of dyspnea, obtain EKG chest x-ray and troponin. [JG]   1638 EKG independently viewed and contemporaneously interpreted by ED physician. Time: 1618.  Rate 106.  Interpretation: Sinus tachycardia, normal axis, normal QRS, no acute ST changes. [JG]   1746 Strep A Ag(!): Positive  Pt feeling a bit better after meds.  Unasyn currently infusing.  I informed of positive strep throat [EW]   1826 Discussed CT soft tissue neck with Dr. Pugh, Radiologist.  No PTA.  She does have some cervical lymphadenopathy.  [EW]   1826 I viewed chest x-ray on PACS my interpretation is no focal infiltrates. [EW]   1832 Pt recheck:  She appears well.  Her HR has improved.  There is no PTA.  She states she feels about \"5 times better\" than when she got here. I discussed plan to d/c home with abx, 3 days of prednisone and chloroseptic throat spray.  Strict RTER precautions given to patient.  [EW]      ED Course User Index  [EW] Angle Arora APRN  [JG] Noah Field MD       AS OF 20:02 EST VITALS:    BP - 131/76  HR - 97  TEMP - 99.3 °F (37.4 °C) (Tympanic)  O2 SATS - 97%    DIAGNOSIS  Final diagnoses:   Strep throat         DISPOSITION  DISCHARGE    Patient discharged in stable condition.    Reviewed implications of results, diagnosis, meds, responsibility to follow up, warning signs and symptoms of possible worsening, potential complications and reasons to return to ER.    Patient/Family voiced understanding of above instructions.    Discussed plan for discharge, as there is no emergent indication for admission. Patient " referred to primary care provider for BP management due to today's BP. Pt/family is agreeable and understands need for follow up and repeat testing.  Pt is aware that discharge does not mean that nothing is wrong but it indicates no emergency is present that requires admission and they must continue care with follow-up as given below or physician of their choice.     FOLLOW-UP  PATIENT CONNECTION - [facility] [address]  [phone]  Schedule an appointment as soon as possible for a visit            Medication List      New Prescriptions    amoxicillin-clavulanate 875-125 MG per tablet  Commonly known as: AUGMENTIN  Take 1 tablet by mouth Every 12 (Twelve) Hours for 7 days.     ibuprofen 800 MG tablet  Commonly known as: ADVIL,MOTRIN  Take 1 tablet by mouth Every 8 (Eight) Hours As Needed for Moderate Pain .     predniSONE 20 MG tablet  Commonly known as: DELTASONE  Take 2 tablets by mouth Daily for 3 days.           Where to Get Your Medications      These medications were sent to [facility]/pharmacy #[id number] - [place], [place] - [address] - [phone]  - [phone] [address] [address]    Hours: 24-hours Phone: [phone]   · amoxicillin-clavulanate 875-125 MG per tablet  · ibuprofen 800 MG tablet  · predniSONE 20 MG tablet          Noah Field MD  12/20/21 2002

## 2021-12-19 NOTE — ED PROVIDER NOTES
EMERGENCY DEPARTMENT ENCOUNTER    Room number:  21/21  Date Seen:  12/19/2021  Time of transfer:1700  PCP:  Provider, No Known    Laboratory Results:  Recent Results (from the past 24 hour(s))   Comprehensive Metabolic Panel    Collection Time: 12/19/21  3:56 PM    Specimen: Blood   Result Value Ref Range    Glucose 103 (H) 65 - 99 mg/dL    BUN 10 6 - 20 mg/dL    Creatinine 0.55 (L) 0.57 - 1.00 mg/dL    Sodium 143 136 - 145 mmol/L    Potassium 3.3 (L) 3.5 - 5.2 mmol/L    Chloride 102 98 - 107 mmol/L    CO2 27.2 22.0 - 29.0 mmol/L    Calcium 9.8 8.6 - 10.5 mg/dL    Total Protein 8.4 6.0 - 8.5 g/dL    Albumin 4.20 3.50 - 5.20 g/dL    ALT (SGPT) 10 1 - 33 U/L    AST (SGOT) 14 1 - 32 U/L    Alkaline Phosphatase 71 39 - 117 U/L    Total Bilirubin 0.4 0.0 - 1.2 mg/dL    eGFR Non African Amer 135 >60 mL/min/1.73    Globulin 4.2 gm/dL    A/G Ratio 1.0 g/dL    BUN/Creatinine Ratio 18.2 7.0 - 25.0    Anion Gap 13.8 5.0 - 15.0 mmol/L   Procalcitonin    Collection Time: 12/19/21  3:56 PM    Specimen: Blood   Result Value Ref Range    Procalcitonin 0.07 0.00 - 0.25 ng/mL   Lactic Acid, Plasma    Collection Time: 12/19/21  3:56 PM    Specimen: Blood   Result Value Ref Range    Lactate 1.0 0.5 - 2.0 mmol/L   Mononucleosis Screen    Collection Time: 12/19/21  3:56 PM    Specimen: Blood   Result Value Ref Range    Monospot Negative Negative   CBC Auto Differential    Collection Time: 12/19/21  3:56 PM    Specimen: Blood   Result Value Ref Range    WBC 10.96 (H) 3.40 - 10.80 10*3/mm3    RBC 4.59 3.77 - 5.28 10*6/mm3    Hemoglobin 14.0 12.0 - 15.9 g/dL    Hematocrit 40.5 34.0 - 46.6 %    MCV 88.2 79.0 - 97.0 fL    MCH 30.5 26.6 - 33.0 pg    MCHC 34.6 31.5 - 35.7 g/dL    RDW 12.2 (L) 12.3 - 15.4 %    RDW-SD 38.9 37.0 - 54.0 fl    MPV 10.9 6.0 - 12.0 fL    Platelets 208 140 - 450 10*3/mm3    Neutrophil % 76.5 (H) 42.7 - 76.0 %    Lymphocyte % 14.7 (L) 19.6 - 45.3 %    Monocyte % 7.5 5.0 - 12.0 %    Eosinophil % 0.5 0.3 - 6.2 %     Basophil % 0.3 0.0 - 1.5 %    Immature Grans % 0.5 0.0 - 0.5 %    Neutrophils, Absolute 8.39 (H) 1.70 - 7.00 10*3/mm3    Lymphocytes, Absolute 1.61 0.70 - 3.10 10*3/mm3    Monocytes, Absolute 0.82 0.10 - 0.90 10*3/mm3    Eosinophils, Absolute 0.06 0.00 - 0.40 10*3/mm3    Basophils, Absolute 0.03 0.00 - 0.20 10*3/mm3    Immature Grans, Absolute 0.05 0.00 - 0.05 10*3/mm3    nRBC 0.0 0.0 - 0.2 /100 WBC   hCG, Serum, Qualitative    Collection Time: 12/19/21  3:56 PM    Specimen: Blood   Result Value Ref Range    HCG Qualitative Negative Negative   Troponin    Collection Time: 12/19/21  3:56 PM    Specimen: Blood   Result Value Ref Range    Troponin T <0.010 0.000 - 0.030 ng/mL   Rapid Strep A Screen - Swab, Throat    Collection Time: 12/19/21  4:05 PM    Specimen: Throat; Swab   Result Value Ref Range    Strep A Ag Positive (A) Negative   COVID-19,[facility] IN-HOUSE CEPHEID/SCOTT NP SWAB IN TRANSPORT MEDIA 8-12 HR TAT - Swab, Nasopharynx    Collection Time: 12/19/21  4:05 PM    Specimen: Nasopharynx; Swab   Result Value Ref Range    COVID19 Not Detected Not Detected - Ref. Range   Influenza Antigen, Rapid - Swab, Nasopharynx    Collection Time: 12/19/21  4:05 PM    Specimen: Nasopharynx; Swab   Result Value Ref Range    Influenza A Ag, EIA Negative Negative    Influenza B Ag, EIA Negative Negative   ECG 12 Lead    Collection Time: 12/19/21  4:18 PM   Result Value Ref Range    QT Interval 317 ms     I reviewed the above results.    Radiology:  XR Chest 1 View   Final Result   No evidence for acute pulmonary process. Follow-up as   clinical indications persist.       This report was finalized on 12/19/2021 4:03 PM by Dr. Ryan Jade M.D.          CT Soft Tissue Neck With Contrast    (Results Pending)     I reviewed the above results    Medications ordered in ED:  Medications   acetaminophen (TYLENOL) tablet 1,000 mg (1,000 mg Oral Given 12/19/21 1622)   sodium chloride 0.9 % bolus 1,000 mL (0 mL Intravenous Stopped  "12/19/21 1746)   phenol (CHLORASEPTIC) 1.4 % liquid 2 spray (2 sprays Mouth/Throat Given 12/19/21 1826)   dexamethasone (DECADRON) injection 6 mg (6 mg Intravenous Given 12/19/21 1622)   ampicillin-sulbactam (UNASYN) 3 g in sodium chloride 0.9 % 100 mL IVPB-VTB (3 g Intravenous New Bag 12/19/21 1630)   aluminum-magnesium hydroxide-simethicone (MAALOX MAX) 400-400-40 MG/5ML suspension 15 mL (15 mL Oral Given 12/19/21 1824)   Lidocaine Viscous HCl (XYLOCAINE) 2 % solution 15 mL (15 mL Mouth/Throat Given 12/19/21 1825)   iopamidol (ISOVUE-300) 61 % injection 100 mL (85 mL Intravenous Given 12/19/21 1807)       Progress and Consult Notes:  :  Patient care transferred from Dr. Field pending CT soft tissue neck and labs     ED Course as of 12/19/21 1838   Sun Dec 19, 2021   1630 Patient presents with sore throat, significant tonsillar swelling with exudate, concern for possible peritonsillar abscess, obtaining CT imaging.  Patient is extremely tachycardic, obtaining septic work-up including blood cultures and lactic acid.  Patient complains of dyspnea, obtain EKG chest x-ray and troponin. [JG]   1638 EKG independently viewed and contemporaneously interpreted by ED physician. Time: 1618.  Rate 106.  Interpretation: Sinus tachycardia, normal axis, normal QRS, no acute ST changes. [JG]   1746 Strep A Ag(!): Positive  Pt feeling a bit better after meds.  Unasyn currently infusing.  I informed of positive strep throat [EW]   1826 Discussed CT soft tissue neck with Dr. Pugh, Radiologist.  No PTA.  She does have some cervical lymphadenopathy.  [EW]   1826 I viewed chest x-ray on PACS my interpretation is no focal infiltrates. [EW]   1832 Pt recheck:  She appears well.  Her HR has improved.  There is no PTA.  She states she feels about \"5 times better\" than when she got here. I discussed plan to d/c home with abx, 3 days of prednisone and chloroseptic throat spray.  Strict RTER precautions given to patient.  [EW]      ED Course " User Index  [EW] Angle Arora APRN  [JG] Noah Field MD         Diagnosis:  Final diagnoses:   Strep throat       Follow Up:  PATIENT CONNECTION - [place] [id number] [phone]  Schedule an appointment as soon as possible for a visit         RX:     Medication List      New Prescriptions    amoxicillin-clavulanate 875-125 MG per tablet  Commonly known as: AUGMENTIN  Take 1 tablet by mouth Every 12 (Twelve) Hours for 7 days.     ibuprofen 800 MG tablet  Commonly known as: ADVIL,MOTRIN  Take 1 tablet by mouth Every 8 (Eight) Hours As Needed for Moderate Pain .     predniSONE 20 MG tablet  Commonly known as: DELTASONE  Take 2 tablets by mouth Daily for 3 days.           Where to Get Your Medications      These medications were sent to [facility]/pharmacy #[id number] - [place], [place] - [address] - [phone]  - [phone]   [address], [facility] [address]    Hours: 24-hours Phone: [phone]   · amoxicillin-clavulanate 875-125 MG per tablet  · ibuprofen 800 MG tablet  · predniSONE 20 MG tablet         Provider attestation:  I personally reviewed the past medical history, past surgical history, social history, family history, current medications, and allergies as they appear in the chart.    The patient was seen and examined by myself and Dr. Field, who agree with plan.          Angle Arora APRN  12/19/21 1058

## 2021-12-20 LAB — QT INTERVAL: 317 MS

## 2021-12-24 LAB
BACTERIA SPEC AEROBE CULT: NORMAL
BACTERIA SPEC AEROBE CULT: NORMAL

## 2022-12-28 ENCOUNTER — HOSPITAL ENCOUNTER (EMERGENCY)
Facility: HOSPITAL | Age: 27
Discharge: HOME | End: 2022-12-28
Attending: EMERGENCY MEDICINE | Admitting: EMERGENCY MEDICINE
Payer: COMMERCIAL

## 2022-12-28 ENCOUNTER — APPOINTMENT (OUTPATIENT)
Dept: GENERAL RADIOLOGY | Facility: HOSPITAL | Age: 27
End: 2022-12-28
Payer: COMMERCIAL

## 2022-12-28 VITALS
DIASTOLIC BLOOD PRESSURE: 43 MMHG | HEART RATE: 90 BPM | RESPIRATION RATE: 16 BRPM | WEIGHT: 144 LBS | TEMPERATURE: 96.9 F | SYSTOLIC BLOOD PRESSURE: 118 MMHG | BODY MASS INDEX: 28.27 KG/M2 | HEIGHT: 60 IN | OXYGEN SATURATION: 98 %

## 2022-12-28 DIAGNOSIS — S30.0XXA LUMBAR CONTUSION, INITIAL ENCOUNTER: Primary | ICD-10-CM

## 2022-12-28 PROCEDURE — 72110 X-RAY EXAM L-2 SPINE 4/>VWS: CPT

## 2022-12-28 PROCEDURE — 99283 EMERGENCY DEPT VISIT LOW MDM: CPT

## 2022-12-28 PROCEDURE — 71101 X-RAY EXAM UNILAT RIBS/CHEST: CPT

## 2022-12-28 RX ORDER — IBUPROFEN 800 MG/1
800 TABLET ORAL ONCE
Status: COMPLETED | OUTPATIENT
Start: 2022-12-28 | End: 2022-12-28

## 2022-12-28 RX ORDER — TOPIRAMATE 50 MG/1
50 TABLET, FILM COATED ORAL DAILY
COMMUNITY

## 2022-12-28 RX ORDER — LIDOCAINE 50 MG/G
1 PATCH TOPICAL EVERY 24 HOURS
Qty: 10 EACH | Refills: 0 | Status: SHIPPED | OUTPATIENT
Start: 2022-12-28 | End: 2023-02-15

## 2022-12-28 RX ORDER — CYCLOBENZAPRINE HCL 10 MG
10 TABLET ORAL 3 TIMES DAILY PRN
Qty: 18 TABLET | Refills: 0 | Status: SHIPPED | OUTPATIENT
Start: 2022-12-28 | End: 2023-02-15

## 2022-12-28 RX ADMIN — IBUPROFEN 800 MG: 800 TABLET, FILM COATED ORAL at 16:31

## 2022-12-28 NOTE — ED TRIAGE NOTES
All triage performed with this RN wearing appropriate PPE.  Pt placed in mask upon arrival to ED.    Patient c/o slip and fall down 10 steps today. She c/o back pain from mid back and down and rib pain. She did hit her head but denies LOC.

## 2022-12-28 NOTE — DISCHARGE INSTRUCTIONS
Medications as ordered  For mild to moderate pain you should alternate Tylenol and Motrin every 4-6 hours  Heat or ice to back  Activity as tolerated  Gentle stretching  Follow up with pmd in 5-7 days if symptoms not improving  Return to er for numbness/tingling to legs, loss of bowel/bladder function, increased pain or any new or worsening symptoms

## 2022-12-28 NOTE — ED PROVIDER NOTES
MD ATTESTATION NOTE    The WINNIE and I have discussed this patient's history, physical exam, and treatment plan.  I have reviewed the documentation and personally had a face to face interaction with the patient. I affirm the documentation and agree with the treatment and plan.  The attached note describes my personal findings.      I provided a substantive portion of the care of the patient.  I personally performed the physical exam in its entirety, and below are my findings.  For this patient encounter, the patient wore surgical mask, I wore full protective PPE including N95 and eye protection.      Brief HPI: Patient presents for evaluation of low back pain.  Patient states she was walking down steps and slipped injuring her lower back.  Patient did not hit her head.  No loss of consciousness.  No neck pain.  Patient has had problems with her back in the past    PHYSICAL EXAM  ED Triage Vitals   Temp Heart Rate Resp BP SpO2   12/28/22 1337 12/28/22 1337 12/28/22 1337 12/28/22 1339 12/28/22 1337   96.9 °F (36.1 °C) 82 16 141/64 100 %      Temp src Heart Rate Source Patient Position BP Location FiO2 (%)   12/28/22 1337 12/28/22 1337 12/28/22 1339 12/28/22 1339 --   Tympanic Monitor Standing Left arm          GENERAL: no acute distress  HENT: nares patent  EYES: no scleral icterus  CV: regular rhythm, normal rate  RESPIRATORY: normal effort  ABDOMEN: soft  MUSCULOSKELETAL: no deformity.  Tenderness to lower back  NEURO: alert, moves all extremities, follows commands  PSYCH:  calm, cooperative  SKIN: warm, dry    Vital signs and nursing notes reviewed.        Plan: X-ray of spine       Francis Gotti MD  12/28/22 6568

## 2022-12-28 NOTE — ED PROVIDER NOTES
EMERGENCY DEPARTMENT ENCOUNTER    Room Number:  B05/05  Date of encounter:  2022  PCP: Provider, No Known  Historian: Patient      PPE    Patient was placed in face mask in first look. Patient was wearing facemask when I entered the room and throughout our encounter. I wore full protective equipment throughout this patient encounter including a face mask, and gloves. Hand hygiene was performed before donning protective equipment and after removal when leaving the room.        HPI:  Chief Complaint: Fall  A complete HPI/ROS/PMH/PSH/SH/FH are unobtainable due to: Nothing    Context: Fe Smith is a 27 y.o. female who arrives to the ED via private vehicle.  Patient presents with c/o mild, constant, achy low back pain status post a fall this morning.  Patient states that she was starting to go down her steps when she slipped and slid down approximately 10 steps inside her home.  She states she was able to get up on her own.  She states the pain has progressed throughout today.  Patient denies LOC, neck pain, numbness or tingling to his lower extremities, loss of bowel or bladder function, nausea, vomiting.  Patient states that nothing makes the symptoms better and movement or changing positions worsens symptoms.    LMP-patient has an IUD      PAST MEDICAL HISTORY  Active Ambulatory Problems     Diagnosis Date Noted   • Other constipation 2019   • Migraine without aura and without status migrainosus, not intractable 2019   • Admission for laboratory examination 2020   •  (normal spontaneous vaginal delivery) 2020   • Postpartum state 2020     Resolved Ambulatory Problems     Diagnosis Date Noted   • Pregnancy 2016   • Rubella non-immune status, antepartum 10/05/2016   • Back pain affecting pregnancy in third trimester 2016   • Encounter for supervision of normal first pregnancy in third trimester 2016   • Prenatal care, subsequent pregnancy in third trimester  09/11/2019   • Nausea and vomiting in pregnancy 09/11/2019   • Iron deficiency anemia during pregnancy 02/05/2020   • Pregnancy 03/01/2020   • Kidney stone complicating pregnancy, third trimester 03/01/2020   • SGA (small for gestational age) 04/01/2020   • Gestational hypertension without significant proteinuria in third trimester 04/08/2020     Past Medical History:   Diagnosis Date   • Abnormal Pap smear of cervix    • Chlamydia    • Gestational hypertension    • Migraine          PAST SURGICAL HISTORY  Past Surgical History:   Procedure Laterality Date   • WISDOM TOOTH EXTRACTION           FAMILY HISTORY  History reviewed. No pertinent family history.      SOCIAL HISTORY  Social History     Socioeconomic History   • Marital status: Single   Tobacco Use   • Smoking status: Never   Substance and Sexual Activity   • Alcohol use: No   • Drug use: No   • Sexual activity: Yes         ALLERGIES  Patient has no known allergies.        REVIEW OF SYSTEMS  Review of Systems     All systems reviewed and negative except for those discussed in HPI.        PHYSICAL EXAM    ED Triage Vitals   Temp Heart Rate Resp BP SpO2   12/28/22 1337 12/28/22 1337 12/28/22 1337 12/28/22 1339 12/28/22 1337   96.9 °F (36.1 °C) 82 16 141/64 100 %       Physical Exam  GENERAL: Well appearing, nontoxic appearing, not distressed  HENT: normocephalic, atraumatic  EYES: no scleral icterus, PERRL  CV: regular rhythm, regular rate, no murmur  RESPIRATORY: normal effort, CTAB  ABDOMEN: soft   MUSCULOSKELETAL: no deformity  Lumbar vertebral tenderness to palpation  No step off or crepitus noted  Right lumbar paraspinal tenderness to palpation  Negative straight Leg Raises bilaterally  5/5 muscle strength with dorsiflexion and flexion  2+ DP & PT pulses bilaterally  Normal sensation to bilateral lower extremities, no saddle paresthesia  NEURO: alert, moves all extremities, follows commands, mental status normal/baseline  SKIN: warm, dry, no rash   Psych:  Appropriate mood and affect  Nursing notes and vital signs reviewed      LAB RESULTS  No results found for this or any previous visit (from the past 24 hour(s)).    Ordered the above labs and independently reviewed the results.      RADIOLOGY  XR Ribs Right With PA Chest    Result Date: 12/28/2022  XR RIBS RIGHT W PA CHEST-  INDICATIONS: Trauma  TECHNIQUE: Frontal views of the chest, 4 views of the right RIBS  COMPARISON: 12/19/2021  FINDINGS:  The heart size is normal. Pulmonary vasculature is unremarkable. No focal pulmonary consolidation, pleural effusion, or pneumothorax. No acute osseous process.  No acute right rib fracture is identified.       No acute right rib fracture is identified. Follow-up/further evaluation can be obtained as indications persist.  This report was finalized on 12/28/2022 2:38 PM by Dr. Ryan Jade M.D.      XR Spine Lumbar Complete 4+VW    Result Date: 12/28/2022  XR SPINE LUMBAR COMPLETE 4+VW-  INDICATIONS: Pain  TECHNIQUE: 6 views of the lumbar spine  COMPARISON: None available  FINDINGS:  Alignment is in range of normal. Vertebral body disc space heights appear preserved. No acute fracture is identified. If there is further clinical concern, MRI could be considered for further evaluation.       As described.  This report was finalized on 12/28/2022 4:36 PM by Dr. Ryan Jade M.D.        I ordered the above noted radiological studies and viewed the images on the PACS system.         MEDICAL RECORD REVIEW  Medical records reviewed in epic, patient had vaginal delivery 4/9/2020      PROCEDURES    Procedures        DIFFERENTIAL DIAGNOSIS  Differential Diagnosis for back pain includes but is not limited to the following:  Musculoskeletal pain, contusion, Disc protrusion, Vertebral fracture, Rib fracture, Sciatica, Osteoarthritis, Spinal Stenosis, Kidney stone        PROGRESS, DATA ANALYSIS, CONSULTS, AND MEDICAL DECISION MAKING        ED Course as of 12/28/22 2003   Wed Dec  28, 2022   0488 Patient is a well-appearing 27-year-old who presents today with low back pain after a slip and fall down 10 steps inside her house early this morning.  Discussed with patient that her rib series showed no rib fractures.  We will send over for a lumbar spine x-ray to rule out fracture or other bony abnormality. [MS]   1726 Reviewed pt's history and workup with Dr. Gotti.  After a bedside evaluation, he agrees with the plan of care.     [MS]   1730 Patient updated on unremarkable lumbar spine x-rays.  Discussed with patient that she contused her back when she slid down the steps.  We will send her out with muscle relaxers and Lidoderm patches.  Strict return to ER precautions given.  Patient has been ambulatory in the ER. [MS]      ED Course User Index  [MS] Anel Han APRN     Discussed plan for discharge, as there is no emergent indication for admission. Pt/family is agreeable and understands need for follow up and repeat testing.  Pt is aware that discharge does not mean that nothing is wrong but it indicates no emergency is present that requires admission and they must continue care with follow-up as given below or physician of their choice.   Patient/Family voiced understanding of above instructions.  Patient discharged in stable condition.    DIAGNOSIS  Final diagnoses:   Lumbar contusion, initial encounter       FOLLOW UP   PATIENT CONNECTION - [place] [id number] [phone]  Schedule an appointment as soon as possible for a visit   If symptoms worsen      RX     Medication List      New Prescriptions    cyclobenzaprine 10 MG tablet  Commonly known as: FLEXERIL  Take 1 tablet by mouth 3 (Three) Times a Day As Needed for Muscle Spasms.     lidocaine 5 %  Commonly known as: LIDODERM  Place 1 patch on the skin as directed by provider Daily. Remove & Discard patch within 12 hours or as directed by MD           Where to Get Your Medications      These  medications were sent to [id number] - [place], [place] - [address] [facility] [address] [facility] - [phone] [facility] [phone] FX  [address], [facility] [address]    Phone: [phone]   · cyclobenzaprine 10 MG tablet  · lidocaine 5 %             MEDICATIONS GIVEN IN ED    Medications   ibuprofen (ADVIL,MOTRIN) tablet 800 mg (800 mg Oral Given 12/28/22 1631)           COURSE & MEDICAL DECISION MAKING  Any/All labs and Any/All Imaging studies that were ordered were reviewed and are noted above.  Results were reviewed/discussed with the patient and they were also made aware of online access.    Pt also made aware that some labs, such as cultures, will not be resulted during ER visit and followup with PMD is necessary.        Anel Han, APRN  12/28/22 2003

## 2022-12-28 NOTE — Clinical Note
[facility] EMERGENCY DEPARTMENT  [address] [facility] [address]  Phone: [phone]    Fe Smith was seen and treated in our emergency department on 12/28/2022.  She may return to work on 12/31/2022.         Thank you for choosing [facility].    Anel Han, APRN

## 2023-02-15 ENCOUNTER — OFFICE VISIT (OUTPATIENT)
Dept: OBSTETRICS AND GYNECOLOGY | Facility: CLINIC | Age: 28
End: 2023-02-15
Payer: COMMERCIAL

## 2023-02-15 VITALS
BODY MASS INDEX: 28.86 KG/M2 | HEIGHT: 60 IN | DIASTOLIC BLOOD PRESSURE: 78 MMHG | HEART RATE: 91 BPM | WEIGHT: 147 LBS | SYSTOLIC BLOOD PRESSURE: 122 MMHG

## 2023-02-15 DIAGNOSIS — R87.610 ATYPICAL SQUAMOUS CELLS OF UNDETERMINED SIGNIFICANCE ON CYTOLOGIC SMEAR OF CERVIX (ASC-US): ICD-10-CM

## 2023-02-15 DIAGNOSIS — N94.10 DYSPAREUNIA, FEMALE: ICD-10-CM

## 2023-02-15 DIAGNOSIS — Z01.419 WOMEN'S ANNUAL ROUTINE GYNECOLOGICAL EXAMINATION: Primary | ICD-10-CM

## 2023-02-15 DIAGNOSIS — Z30.432 ENCOUNTER FOR IUD REMOVAL: ICD-10-CM

## 2023-02-15 DIAGNOSIS — N93.0 POSTCOITAL BLEEDING: ICD-10-CM

## 2023-02-15 DIAGNOSIS — Z30.9 ENCOUNTER FOR CONTRACEPTIVE MANAGEMENT, UNSPECIFIED TYPE: ICD-10-CM

## 2023-02-15 PROCEDURE — 58301 REMOVE INTRAUTERINE DEVICE: CPT | Performed by: NURSE PRACTITIONER

## 2023-02-15 PROCEDURE — 2014F MENTAL STATUS ASSESS: CPT | Performed by: NURSE PRACTITIONER

## 2023-02-15 PROCEDURE — 3008F BODY MASS INDEX DOCD: CPT | Performed by: NURSE PRACTITIONER

## 2023-02-15 PROCEDURE — 99395 PREV VISIT EST AGE 18-39: CPT | Performed by: NURSE PRACTITIONER

## 2023-02-15 PROCEDURE — 99213 OFFICE O/P EST LOW 20 MIN: CPT | Performed by: NURSE PRACTITIONER

## 2023-02-15 RX ORDER — SUMATRIPTAN 100 MG/1
100 TABLET, FILM COATED ORAL
COMMUNITY
Start: 2022-06-10 | End: 2023-06-10

## 2023-02-15 RX ORDER — AMOXICILLIN 500 MG/1
1 TABLET, FILM COATED ORAL EVERY 12 HOURS SCHEDULED
COMMUNITY
Start: 2023-02-07

## 2023-02-15 RX ORDER — PROMETHAZINE HYDROCHLORIDE 25 MG/1
12.5 TABLET ORAL
COMMUNITY
Start: 2012-11-01 | End: 2023-05-25

## 2023-02-15 RX ORDER — ACETAMINOPHEN AND CODEINE PHOSPHATE 120; 12 MG/5ML; MG/5ML
1 SOLUTION ORAL DAILY
Qty: 84 TABLET | Refills: 3 | Status: SHIPPED | OUTPATIENT
Start: 2023-02-15 | End: 2024-02-15

## 2023-02-15 NOTE — PROGRESS NOTES
GYN Annual Exam     Chief Complaint   Patient presents with   • Procedure     Pt presents for IUD removal    • Follow-up     Gyn f/u        HPI    Fe Smith is a 27 y.o. female who presents for IUD removal. She is overdue for annual well woman exam and desires to complete this today.  She is sexually active. Performing SBE:occas. Periods are rare with IUD in place. She would like IUD removed today do to new onset pain after intercourse (pevlic cramping) and new onset postcoital bleeding. Denies new partners. She has noticed a discharge, only after IC. Denies itching or odor.   Hx migraine with aura, no hx DVT, she is a nonsmoker    This is my first time meeting Fe Smith  She is a former pt of Dr Kapoor's  OB History        2    Para   2    Term   2       0    AB   0    Living   2       SAB   0    IAB   0    Ectopic   0    Molar   0    Multiple   0    Live Births   2                LMP- rare  Current contraception: IUD  Last Pap- 2020  History of abnormal Pap smear: Yes, ASCUS, HPV +  History of STD-chlamydia   Family history of uterine, colon or ovarian cancer: no  Family history of breast cancer: no  Gardasil Vaccine: unsure    Past Medical History:   Diagnosis Date   • Abnormal Pap smear of cervix    • Chlamydia     3 years ago   • Gestational hypertension    • Migraine        Past Surgical History:   Procedure Laterality Date   • WISDOM TOOTH EXTRACTION           Current Outpatient Medications:   •  amoxicillin (AMOXIL) 500 MG tablet, Take 1 tablet by mouth Every 12 (Twelve) Hours., Disp: , Rfl:   •  ibuprofen (ADVIL,MOTRIN) 800 MG tablet, Take 1 tablet by mouth Every 8 (Eight) Hours As Needed for Moderate Pain ., Disp: 30 tablet, Rfl: 0  •  promethazine (PHENERGAN) 25 MG tablet, Take 12.5 mg by mouth., Disp: , Rfl:   •  SUMAtriptan (IMITREX) 100 MG tablet, Take 100 mg by mouth., Disp: , Rfl:   •  topiramate (TOPAMAX) 50 MG tablet, Take 50 mg by mouth Daily., Disp: , Rfl:   •   "norethindrone (MICRONOR) 0.35 MG tablet, Take 1 tablet by mouth Daily., Disp: 84 tablet, Rfl: 3    No Known Allergies    Social History     Tobacco Use   • Smoking status: Never   Substance Use Topics   • Alcohol use: No   • Drug use: No       History reviewed. No pertinent family history.    Review of Systems   Constitutional: Negative for chills, fatigue and fever.   Gastrointestinal: Negative for abdominal distention, abdominal pain, nausea and vomiting.   Genitourinary: Positive for dyspareunia and vaginal discharge (following IC). Negative for breast discharge, breast lump, breast pain, dysuria, menstrual problem, pelvic pain, pelvic pressure, vaginal bleeding and vaginal pain.        +postcoital spotting   Musculoskeletal: Negative for gait problem.   Skin: Negative for rash.   Neurological: Negative for dizziness and headache.   Psychiatric/Behavioral: Negative for behavioral problems.       /78   Pulse 91   Ht 152.4 cm (60\")   Wt 66.7 kg (147 lb)   BMI 28.71 kg/m²     Physical Exam  Constitutional:       General: She is not in acute distress.     Appearance: Normal appearance. She is not ill-appearing, toxic-appearing or diaphoretic.   Genitourinary:      Vulva, bladder and urethral meatus normal.      No lesions in the vagina.      Right Labia: No rash, tenderness, lesions, skin changes or Bartholin's cyst.     Left Labia: No tenderness, lesions, skin changes, Bartholin's cyst or rash.     No labial fusion noted.      No inguinal adenopathy present in the right or left side.     No vaginal discharge, erythema, tenderness, bleeding or ulceration.      No vaginal prolapse present.     No vaginal atrophy present.       Right Adnexa: not tender, not full, not palpable, no mass present and not absent.     Left Adnexa: not tender, not full, not palpable, no mass present and not absent.     Cervical friability present.      No cervical motion tenderness, discharge, lesion, polyp, nabothian cyst or " eversion.      IUD strings visualized.      Uterus is not enlarged, fixed, tender, irregular or prolapsed.      No uterine mass detected.     No urethral tenderness or mass present.      Pelvic exam was performed with patient in the lithotomy position.   Breasts:     Breasts are symmetrical.      Right: Present. No swelling, bleeding, inverted nipple, mass, nipple discharge, skin change, tenderness or breast implant.      Left: Present. No swelling, bleeding, inverted nipple, mass, nipple discharge, skin change, tenderness or breast implant.   HENT:      Head: Normocephalic and atraumatic.   Eyes:      Pupils: Pupils are equal, round, and reactive to light.   Cardiovascular:      Rate and Rhythm: Normal rate.   Pulmonary:      Effort: Pulmonary effort is normal.   Abdominal:      General: There is no distension.      Palpations: Abdomen is soft. There is no mass.      Tenderness: There is no abdominal tenderness. There is no guarding.      Hernia: No hernia is present. There is no hernia in the left inguinal area or right inguinal area.   Musculoskeletal:         General: Normal range of motion.      Cervical back: Normal range of motion and neck supple. No tenderness.   Lymphadenopathy:      Cervical: No cervical adenopathy.      Upper Body:      Right upper body: No supraclavicular, axillary or pectoral adenopathy.      Left upper body: No supraclavicular, axillary or pectoral adenopathy.      Lower Body: No right inguinal adenopathy. No left inguinal adenopathy.   Neurological:      General: No focal deficit present.      Mental Status: She is alert and oriented to person, place, and time.      Cranial Nerves: No cranial nerve deficit.   Skin:     General: Skin is warm and dry.   Psychiatric:         Mood and Affect: Mood normal.         Behavior: Behavior normal.         Thought Content: Thought content normal.         Judgment: Judgment normal.   Vitals and nursing note reviewed.     CC:Here for IUD  removal    Type of IUD:  Mirena  Date of insertion:  6/22/2020  Reason for removal:  Side effect: pain and bleeding with IC  Other relevant history/information:  none    Procedure Time Out Documentation      Procedure Details  IUD strings visible:  yes  Local anesthesia:  None  Tenaculum used:  None  Removal:  IUD strings grasped and IUD removed intact with gentle traction.  The patient tolerated the procedure well.    All appropriate instructions regarding removal were reviewed.    Tolerated well  No apparent complications  Post procedure diagnosis : IUD removal     Plans for contraception:  oral progesterone-only contraceptive    Other follow-up needed:  none    The patient was advised to call for any fever or for prolonged or severe pain or bleeding. She was advised to use NSAID as needed for mild to moderate pain.       Assessment   Diagnoses and all orders for this visit:    1. Women's annual routine gynecological examination (Primary)  -     IGP, Apt HPV,rfx 16 / 18,45    2. Encounter for IUD removal    3. Atypical squamous cells of undetermined significance on cytologic smear of cervix (ASC-US)  -     IGP, Apt HPV,rfx 16 / 18,45    4. Postcoital bleeding  -     NuSwab VG+ - Swab, Vagina    5. Dyspareunia, female  -     NuSwab VG+ - Swab, Vagina    6. Encounter for contraceptive management, unspecified type  -     norethindrone (MICRONOR) 0.35 MG tablet; Take 1 tablet by mouth Daily.  Dispense: 84 tablet; Refill: 3         Plan   1. Well woman exam: Pap collected Yes. Recommend MVI daily.    2. Contraception: Discussed contraception options at length including pills, patch, vaginal ring, POPs,  injection, implant, and IUDs.  The risks and benefits of the methods were discussed including but not limited to the increased risk of heart attack, blood clot, and stroke.  It was discussed the contraception does not protect against sexually transmitted infections and condoms are encouraged. Recommend progesterone only  given hx migraine with aura. The patient desires to start POP. Discussed start up, uses, side effects, risks vs benefits. Encouraged condoms for the first 3 weeks of use as back up.   3. STD: Enc condoms. STD screen today- Yes. NuSwab-collected due to new onset postcoital spotting  4. Smoking status: nonsmoker  5.  Encouraged annual mammogram screening starting at age 40. Instructed on how to perform SBE. Encouraged breast health self awareness.  6.    Encouraged 150 minutes of exercise per week if not medially contraindicated.   7.    Overweight by BMI 28.71  8.    Advised pt that IUD may not be the source for pain with IC and bleeding after IC. Offered to first collect cultures to R/O infection before removing, also discussed pelvic floor muscles, pain could be R/T hypertonicity. She continues to desire removal of IUD at this time. Advised to f/u if on improvement in pain 2-3 weeks after removal of IUD. Cervix was very friable, prior abnormal pap smear in 2020, pt has not followed up since. Discussed bleeding could be coming from cervix. Pap smear and vaginal cultures obtained.     Follow Up one year or PRN    Angle Fagan, APRN  2/15/2023  09:29 EST

## 2023-02-17 LAB
A VAGINAE DNA VAG QL NAA+PROBE: NORMAL SCORE
BVAB2 DNA VAG QL NAA+PROBE: NORMAL SCORE
C ALBICANS DNA VAG QL NAA+PROBE: NEGATIVE
C GLABRATA DNA VAG QL NAA+PROBE: NEGATIVE
C TRACH DNA VAG QL NAA+PROBE: NEGATIVE
MEGA1 DNA VAG QL NAA+PROBE: NORMAL SCORE
N GONORRHOEA DNA VAG QL NAA+PROBE: NEGATIVE
T VAGINALIS DNA VAG QL NAA+PROBE: NEGATIVE

## 2023-02-21 LAB
CYTOLOGIST CVX/VAG CYTO: ABNORMAL
CYTOLOGY CVX/VAG DOC CYTO: ABNORMAL
CYTOLOGY CVX/VAG DOC THIN PREP: ABNORMAL
DX ICD CODE: ABNORMAL
HIV 1 & 2 AB SER-IMP: ABNORMAL
HPV I/H RISK 4 DNA CVX QL PROBE+SIG AMP: POSITIVE
HPV16 DNA CVX QL PROBE+SIG AMP: POSITIVE
HPV18+45 E6+E7 MRNA CVX QL NAA+PROBE: NEGATIVE
OTHER STN SPEC: ABNORMAL
STAT OF ADQ CVX/VAG CYTO-IMP: ABNORMAL

## 2023-02-23 ENCOUNTER — TELEPHONE (OUTPATIENT)
Dept: OBSTETRICS AND GYNECOLOGY | Facility: CLINIC | Age: 28
End: 2023-02-23
Payer: COMMERCIAL

## 2023-02-23 NOTE — TELEPHONE ENCOUNTER
I called Fe Smith to review abnormal pap smear results. No answer, left VM to return my call.     Pap smear returned normal, however HPV testing continues to show positive. This is also showing positive for a high risk genotype. I recommend a colposcopy with MD in our office (former [facility] pt)    Angle Fagan, LISA  2/23/23  8:41am

## 2023-02-24 ENCOUNTER — TELEPHONE (OUTPATIENT)
Dept: OBSTETRICS AND GYNECOLOGY | Facility: CLINIC | Age: 28
End: 2023-02-24
Payer: COMMERCIAL

## 2023-02-24 NOTE — TELEPHONE ENCOUNTER
I called Fe Smith to review abnormal pap smear results. No answer, left VM to return my call. This is my second attempt to reach the pt.      Pap smear returned normal, however HPV testing continues to show positive. This is also showing positive for a high risk genotype. I recommend a colposcopy with MD in our office (former [facility] pt)     Angle Fagan, LISA  2/24/23  11:33am

## 2023-03-03 ENCOUNTER — TELEPHONE (OUTPATIENT)
Dept: OBSTETRICS AND GYNECOLOGY | Facility: CLINIC | Age: 28
End: 2023-03-03
Payer: COMMERCIAL

## 2023-03-07 ENCOUNTER — TELEPHONE (OUTPATIENT)
Dept: OBSTETRICS AND GYNECOLOGY | Facility: CLINIC | Age: 28
End: 2023-03-07
Payer: COMMERCIAL

## 2023-03-07 NOTE — TELEPHONE ENCOUNTER
I returned Fe Smith's call. Discussed pap smear result was normal, however HPV testing continues to be positive and additionally positive for a high risk genotype. She was also positive for high risk genotype in 2020, did not return for colposcopy at that time. Discussed recommendations for colposcopy to further evaluate this result. Discussed gardasil vaccine, she is unsure if she has completed. Discussed in detail, advised to check with pediatrician to see if records indicate she has completed. I recommend completing if she has not already done so. She was placed on a a brief hold and scheduled for colposcopy.    Angle Fagan, APRN  3/7/2023  9:34am

## 2023-03-07 NOTE — TELEPHONE ENCOUNTER
Patient states she is returning your missed calls, would like to discuss pap results further.     Thank you!

## 2023-03-13 ENCOUNTER — PROCEDURE VISIT (OUTPATIENT)
Dept: OBSTETRICS AND GYNECOLOGY | Facility: CLINIC | Age: 28
End: 2023-03-13
Payer: COMMERCIAL

## 2023-03-13 VITALS
WEIGHT: 147 LBS | BODY MASS INDEX: 28.86 KG/M2 | DIASTOLIC BLOOD PRESSURE: 78 MMHG | SYSTOLIC BLOOD PRESSURE: 127 MMHG | HEIGHT: 60 IN

## 2023-03-13 DIAGNOSIS — Z87.42 H/O ABNORMAL CERVICAL PAPANICOLAOU SMEAR: Primary | ICD-10-CM

## 2023-03-13 PROCEDURE — 57456 ENDOCERV CURETTAGE W/SCOPE: CPT | Performed by: OBSTETRICS & GYNECOLOGY

## 2023-03-13 NOTE — PROGRESS NOTES
Colposcopy    Date of procedure:  3/13/2023    Risks and benefits discussed? yes  All questions answered? yes  Consents given by the patient  Written consent obtained? yes    Local anesthesia used:  no    Pre-op indication: Neg Pap with positive HPV 16 which is high risk.  Procedure documentation:    The cervix was initially viewed colposcopically through a green filter.  The cervix was next bathed in acetic acid.   The findings were as follows:      The transformation zone was able to be seen adequately.    No visible lesions    Ectocervical biopsies not obtained..    An ECC was performed.      Colposcopic Impression: 1. Adequate colposcopy  2. Colposcopic findings are consistent with PAP       Plan: Planning repeat Pap smear in 6 months.  Patient will call 1 week for results of ECC.      This note was electronically signed.          Ryan Echeverria MD   March 13, 2023

## 2023-03-16 ENCOUNTER — TELEPHONE (OUTPATIENT)
Dept: OBSTETRICS AND GYNECOLOGY | Facility: CLINIC | Age: 28
End: 2023-03-16
Payer: COMMERCIAL

## 2023-03-16 LAB
DX ICD CODE: NORMAL
DX ICD CODE: NORMAL
PATH REPORT.FINAL DX SPEC: NORMAL
PATH REPORT.GROSS SPEC: NORMAL
PATH REPORT.SITE OF ORIGIN SPEC: NORMAL
PATHOLOGIST NAME: NORMAL
PAYMENT PROCEDURE: NORMAL

## 2023-03-16 NOTE — TELEPHONE ENCOUNTER
----- Message from Ryan Echeverria MD sent at 3/16/2023 12:41 PM EDT -----  Please tell patient that her cervical biopsy was totally benign.  She just needs a follow-up Pap smear in 6 months.  Thank you.

## 2023-03-16 NOTE — PROGRESS NOTES
Please tell patient that her cervical biopsy was totally benign.  She just needs a follow-up Pap smear in 6 months.  Thank you.

## 2023-03-21 ENCOUNTER — APPOINTMENT (OUTPATIENT)
Dept: CT IMAGING | Facility: HOSPITAL | Age: 28
End: 2023-03-21
Payer: COMMERCIAL

## 2023-03-21 ENCOUNTER — HOSPITAL ENCOUNTER (EMERGENCY)
Facility: HOSPITAL | Age: 28
Discharge: HOME | End: 2023-03-21
Attending: EMERGENCY MEDICINE | Admitting: EMERGENCY MEDICINE
Payer: COMMERCIAL

## 2023-03-21 VITALS
RESPIRATION RATE: 12 BRPM | TEMPERATURE: 97.2 F | HEART RATE: 70 BPM | SYSTOLIC BLOOD PRESSURE: 113 MMHG | OXYGEN SATURATION: 100 % | DIASTOLIC BLOOD PRESSURE: 70 MMHG

## 2023-03-21 DIAGNOSIS — K52.9 GASTROENTERITIS: Primary | ICD-10-CM

## 2023-03-21 LAB
ALBUMIN SERPL-MCNC: 4.4 G/DL (ref 3.5–5.2)
ALBUMIN/GLOB SERPL: 1.3 G/DL
ALP SERPL-CCNC: 53 U/L (ref 39–117)
ALT SERPL W P-5'-P-CCNC: 19 U/L (ref 1–33)
ANION GAP SERPL CALCULATED.3IONS-SCNC: 8 MMOL/L (ref 5–15)
AST SERPL-CCNC: 37 U/L (ref 1–32)
BASOPHILS # BLD AUTO: 0.02 10*3/MM3 (ref 0–0.2)
BASOPHILS NFR BLD AUTO: 0.3 % (ref 0–1.5)
BILIRUB SERPL-MCNC: 0.3 MG/DL (ref 0–1.2)
BILIRUB UR QL STRIP: NEGATIVE
BUN SERPL-MCNC: 12 MG/DL (ref 6–20)
BUN/CREAT SERPL: 19 (ref 7–25)
CALCIUM SPEC-SCNC: 9.2 MG/DL (ref 8.6–10.5)
CHLORIDE SERPL-SCNC: 108 MMOL/L (ref 98–107)
CLARITY UR: CLEAR
CO2 SERPL-SCNC: 21 MMOL/L (ref 22–29)
COLOR UR: YELLOW
CREAT SERPL-MCNC: 0.63 MG/DL (ref 0.57–1)
DEPRECATED RDW RBC AUTO: 38.4 FL (ref 37–54)
EGFRCR SERPLBLD CKD-EPI 2021: 125.7 ML/MIN/1.73
EOSINOPHIL # BLD AUTO: 0.03 10*3/MM3 (ref 0–0.4)
EOSINOPHIL NFR BLD AUTO: 0.4 % (ref 0.3–6.2)
ERYTHROCYTE [DISTWIDTH] IN BLOOD BY AUTOMATED COUNT: 12 % (ref 12.3–15.4)
GLOBULIN UR ELPH-MCNC: 3.5 GM/DL
GLUCOSE SERPL-MCNC: 111 MG/DL (ref 65–99)
GLUCOSE UR STRIP-MCNC: NEGATIVE MG/DL
HCG SERPL QL: NEGATIVE
HCT VFR BLD AUTO: 36.7 % (ref 34–46.6)
HGB BLD-MCNC: 13 G/DL (ref 12–15.9)
HGB UR QL STRIP.AUTO: NEGATIVE
IMM GRANULOCYTES # BLD AUTO: 0.04 10*3/MM3 (ref 0–0.05)
IMM GRANULOCYTES NFR BLD AUTO: 0.6 % (ref 0–0.5)
KETONES UR QL STRIP: ABNORMAL
LEUKOCYTE ESTERASE UR QL STRIP.AUTO: NEGATIVE
LIPASE SERPL-CCNC: 21 U/L (ref 13–60)
LYMPHOCYTES # BLD AUTO: 1.05 10*3/MM3 (ref 0.7–3.1)
LYMPHOCYTES NFR BLD AUTO: 14.5 % (ref 19.6–45.3)
MCH RBC QN AUTO: 31.2 PG (ref 26.6–33)
MCHC RBC AUTO-ENTMCNC: 35.4 G/DL (ref 31.5–35.7)
MCV RBC AUTO: 88 FL (ref 79–97)
MONOCYTES # BLD AUTO: 0.28 10*3/MM3 (ref 0.1–0.9)
MONOCYTES NFR BLD AUTO: 3.9 % (ref 5–12)
NEUTROPHILS NFR BLD AUTO: 5.82 10*3/MM3 (ref 1.7–7)
NEUTROPHILS NFR BLD AUTO: 80.3 % (ref 42.7–76)
NITRITE UR QL STRIP: NEGATIVE
NRBC BLD AUTO-RTO: 0 /100 WBC (ref 0–0.2)
PH UR STRIP.AUTO: 7 [PH] (ref 5–8)
PLATELET # BLD AUTO: 215 10*3/MM3 (ref 140–450)
PMV BLD AUTO: 11 FL (ref 6–12)
POTASSIUM SERPL-SCNC: 5.3 MMOL/L (ref 3.5–5.2)
PROT SERPL-MCNC: 7.9 G/DL (ref 6–8.5)
PROT UR QL STRIP: NEGATIVE
RBC # BLD AUTO: 4.17 10*6/MM3 (ref 3.77–5.28)
SODIUM SERPL-SCNC: 137 MMOL/L (ref 136–145)
SP GR UR STRIP: <=1.005 (ref 1–1.03)
UROBILINOGEN UR QL STRIP: ABNORMAL
WBC NRBC COR # BLD: 7.24 10*3/MM3 (ref 3.4–10.8)

## 2023-03-21 PROCEDURE — 85025 COMPLETE CBC W/AUTO DIFF WBC: CPT | Performed by: NURSE PRACTITIONER

## 2023-03-21 PROCEDURE — 96375 TX/PRO/DX INJ NEW DRUG ADDON: CPT

## 2023-03-21 PROCEDURE — 25010000002 ONDANSETRON PER 1 MG: Performed by: NURSE PRACTITIONER

## 2023-03-21 PROCEDURE — 84703 CHORIONIC GONADOTROPIN ASSAY: CPT | Performed by: NURSE PRACTITIONER

## 2023-03-21 PROCEDURE — 81003 URINALYSIS AUTO W/O SCOPE: CPT | Performed by: NURSE PRACTITIONER

## 2023-03-21 PROCEDURE — 74176 CT ABD & PELVIS W/O CONTRAST: CPT

## 2023-03-21 PROCEDURE — 36415 COLL VENOUS BLD VENIPUNCTURE: CPT

## 2023-03-21 PROCEDURE — 96361 HYDRATE IV INFUSION ADD-ON: CPT

## 2023-03-21 PROCEDURE — 80053 COMPREHEN METABOLIC PANEL: CPT | Performed by: NURSE PRACTITIONER

## 2023-03-21 PROCEDURE — 99283 EMERGENCY DEPT VISIT LOW MDM: CPT

## 2023-03-21 PROCEDURE — 96374 THER/PROPH/DIAG INJ IV PUSH: CPT

## 2023-03-21 PROCEDURE — 25010000002 KETOROLAC TROMETHAMINE PER 15 MG: Performed by: NURSE PRACTITIONER

## 2023-03-21 PROCEDURE — 83690 ASSAY OF LIPASE: CPT | Performed by: NURSE PRACTITIONER

## 2023-03-21 RX ORDER — ONDANSETRON 2 MG/ML
4 INJECTION INTRAMUSCULAR; INTRAVENOUS ONCE
Status: COMPLETED | OUTPATIENT
Start: 2023-03-21 | End: 2023-03-21

## 2023-03-21 RX ORDER — KETOROLAC TROMETHAMINE 15 MG/ML
15 INJECTION, SOLUTION INTRAMUSCULAR; INTRAVENOUS ONCE
Status: COMPLETED | OUTPATIENT
Start: 2023-03-21 | End: 2023-03-21

## 2023-03-21 RX ORDER — ONDANSETRON 4 MG/1
4 TABLET, ORALLY DISINTEGRATING ORAL EVERY 8 HOURS PRN
Qty: 12 TABLET | Refills: 0 | Status: SHIPPED | OUTPATIENT
Start: 2023-03-21

## 2023-03-21 RX ORDER — DICYCLOMINE HCL 20 MG
20 TABLET ORAL 3 TIMES DAILY PRN
Qty: 12 TABLET | Refills: 0 | Status: SHIPPED | OUTPATIENT
Start: 2023-03-21

## 2023-03-21 RX ADMIN — ONDANSETRON 4 MG: 2 INJECTION INTRAMUSCULAR; INTRAVENOUS at 08:42

## 2023-03-21 RX ADMIN — KETOROLAC TROMETHAMINE 15 MG: 15 INJECTION, SOLUTION INTRAMUSCULAR; INTRAVENOUS at 08:42

## 2023-03-21 RX ADMIN — SODIUM CHLORIDE, POTASSIUM CHLORIDE, SODIUM LACTATE AND CALCIUM CHLORIDE 1000 ML: 600; 310; 30; 20 INJECTION, SOLUTION INTRAVENOUS at 08:42

## 2023-03-21 NOTE — ED PROVIDER NOTES
MD ATTESTATION NOTE    The WINNIE and I have discussed this patient's history, physical exam, and treatment plan.  I have reviewed the documentation and personally had a face to face interaction with the patient. I affirm the documentation and agree with the treatment and plan.  The attached note describes my personal findings.      I provided a substantive portion of the care of the patient.  I personally performed the physical exam in its entirety, and below are my findings.  For this patient encounter, the patient wore surgical mask, I wore full protective PPE including N95 and eye protection.      Brief HPI: Patient presents for evaluation of right-sided abdominal pain.  Patient was seen at urgent care yesterday for vomiting and diarrhea and was told she had gastroenteritis.  Patient states the diarrhea is improved.  Has had some dry heaves and now having right-sided abdominal pain.  States the pain similar to her prior kidney stone pain    PHYSICAL EXAM  ED Triage Vitals   Temp Heart Rate Resp BP SpO2   03/21/23 0810 03/21/23 0810 03/21/23 0810 03/21/23 0850 03/21/23 0810   97.2 °F (36.2 °C) 109 20 112/72 98 %      Temp src Heart Rate Source Patient Position BP Location FiO2 (%)   03/21/23 0810 -- -- -- --   Tympanic             GENERAL: no acute distress  HENT: nares patent  EYES: no scleral icterus  CV: regular rhythm, normal rate  RESPIRATORY: normal effort  ABDOMEN: soft.  Mild right upper quadrant tender  MUSCULOSKELETAL: no deformity  NEURO: alert, moves all extremities, follows commands  PSYCH:  calm, cooperative  SKIN: warm, dry    Vital signs and nursing notes reviewed.        Plan: Lab evaluation CT scan of the abdomen       Francis Gotti MD  03/21/23 0950

## 2023-03-21 NOTE — ED TRIAGE NOTES
Patient to er from home with c/o increasing in lower ABD pain along with nausea and vomiting. Patient has mask on in triage along with staff.

## 2023-03-21 NOTE — ED PROVIDER NOTES
EMERGENCY DEPARTMENT ENCOUNTER    Room Number:    Date seen:  3/21/2023  PCP: Provider, No Known  Historian/Independent historian: self  Chronic or social conditions impacting care: n/a       HPI:  Chief Complaint: abdominal pain  A complete HPI/ROS/PMH/PSH/SH/FH are unobtainable due to: n/a  Context: Fe Smith is a 27 y.o. female who presents to the ED c/o right-sided abdominal pain that woke her up out of her sleep around 3 AM.  She states it has been constant and is not moving.  She has had associated nausea and vomiting.  No diarrhea.  No fever, abnormal vaginal bleeding or discharge, dysuria, hematuria.  She states that this pain is similar to when she has had kidney stones in the past.    External Medical record review:   3/20/2023: [facility] visit for gastroenteritis-negative COVID/flu A/B given Zofran for nausea      PAST MEDICAL HISTORY  Active Ambulatory Problems     Diagnosis Date Noted   • Other constipation 2019   • Migraine without aura and without status migrainosus, not intractable 2019   • Admission for laboratory examination 2020   •  (normal spontaneous vaginal delivery) 2020   • Postpartum state 2020     Resolved Ambulatory Problems     Diagnosis Date Noted   • Pregnancy 2016   • Rubella non-immune status, antepartum 10/05/2016   • Back pain affecting pregnancy in third trimester 2016   • Encounter for supervision of normal first pregnancy in third trimester 2016   • Prenatal care, subsequent pregnancy in third trimester 2019   • Nausea and vomiting in pregnancy 2019   • Iron deficiency anemia during pregnancy 2020   • Pregnancy 2020   • Kidney stone complicating pregnancy, third trimester 2020   • SGA (small for gestational age) 2020   • Gestational hypertension without significant proteinuria in third trimester 2020     Past Medical History:   Diagnosis Date   • Abnormal Pap smear of cervix    •  Chlamydia    • Gestational hypertension    • Migraine          PAST SURGICAL HISTORY  Past Surgical History:   Procedure Laterality Date   • WISDOM TOOTH EXTRACTION           FAMILY HISTORY  History reviewed. No pertinent family history.      SOCIAL HISTORY  Social History     Socioeconomic History   • Marital status: Single   Tobacco Use   • Smoking status: Never   Substance and Sexual Activity   • Alcohol use: No   • Drug use: No   • Sexual activity: Yes         ALLERGIES  Patient has no known allergies.        REVIEW OF SYSTEMS  Per HPI, otherwise negative.       PHYSICAL EXAM  ED Triage Vitals [03/21/23 0810]   Temp Heart Rate Resp BP SpO2   97.2 °F (36.2 °C) 109 20 -- 98 %      Temp src Heart Rate Source Patient Position BP Location FiO2 (%)   Tympanic -- -- -- --       Physical Exam  Vitals and nursing note reviewed.   Constitutional:       Appearance: Normal appearance. She is not ill-appearing.   HENT:      Head: Normocephalic.   Eyes:      Conjunctiva/sclera: Conjunctivae normal.   Cardiovascular:      Rate and Rhythm: Normal rate and regular rhythm.      Pulses: Normal pulses.      Heart sounds: Normal heart sounds.   Pulmonary:      Effort: Pulmonary effort is normal.      Breath sounds: Normal breath sounds.   Abdominal:      General: Bowel sounds are normal.      Palpations: Abdomen is soft.      Tenderness: There is abdominal tenderness (mild, diffuse). There is no right CVA tenderness, left CVA tenderness, guarding or rebound.   Skin:     General: Skin is warm.      Capillary Refill: Capillary refill takes less than 2 seconds.   Neurological:      Mental Status: She is alert and oriented to person, place, and time. Mental status is at baseline.   Psychiatric:         Mood and Affect: Mood normal.           LAB RESULTS  Recent Results (from the past 24 hour(s))   hCG, Serum, Qualitative    Collection Time: 03/21/23  8:41 AM    Specimen: Blood   Result Value Ref Range    HCG Qualitative Negative  Negative   Comprehensive Metabolic Panel    Collection Time: 03/21/23  8:41 AM    Specimen: Blood   Result Value Ref Range    Glucose 111 (H) 65 - 99 mg/dL    BUN 12 6 - 20 mg/dL    Creatinine 0.63 0.57 - 1.00 mg/dL    Sodium 137 136 - 145 mmol/L    Potassium 5.3 (H) 3.5 - 5.2 mmol/L    Chloride 108 (H) 98 - 107 mmol/L    CO2 21.0 (L) 22.0 - 29.0 mmol/L    Calcium 9.2 8.6 - 10.5 mg/dL    Total Protein 7.9 6.0 - 8.5 g/dL    Albumin 4.4 3.5 - 5.2 g/dL    ALT (SGPT) 19 1 - 33 U/L    AST (SGOT) 37 (H) 1 - 32 U/L    Alkaline Phosphatase 53 39 - 117 U/L    Total Bilirubin 0.3 0.0 - 1.2 mg/dL    Globulin 3.5 gm/dL    A/G Ratio 1.3 g/dL    BUN/Creatinine Ratio 19.0 7.0 - 25.0    Anion Gap 8.0 5.0 - 15.0 mmol/L    eGFR 125.7 >60.0 mL/min/1.73   Lipase    Collection Time: 03/21/23  8:41 AM    Specimen: Blood   Result Value Ref Range    Lipase 21 13 - 60 U/L   CBC Auto Differential    Collection Time: 03/21/23  8:41 AM    Specimen: Blood   Result Value Ref Range    WBC 7.24 3.40 - 10.80 10*3/mm3    RBC 4.17 3.77 - 5.28 10*6/mm3    Hemoglobin 13.0 12.0 - 15.9 g/dL    Hematocrit 36.7 34.0 - 46.6 %    MCV 88.0 79.0 - 97.0 fL    MCH 31.2 26.6 - 33.0 pg    MCHC 35.4 31.5 - 35.7 g/dL    RDW 12.0 (L) 12.3 - 15.4 %    RDW-SD 38.4 37.0 - 54.0 fl    MPV 11.0 6.0 - 12.0 fL    Platelets 215 140 - 450 10*3/mm3    Neutrophil % 80.3 (H) 42.7 - 76.0 %    Lymphocyte % 14.5 (L) 19.6 - 45.3 %    Monocyte % 3.9 (L) 5.0 - 12.0 %    Eosinophil % 0.4 0.3 - 6.2 %    Basophil % 0.3 0.0 - 1.5 %    Immature Grans % 0.6 (H) 0.0 - 0.5 %    Neutrophils, Absolute 5.82 1.70 - 7.00 10*3/mm3    Lymphocytes, Absolute 1.05 0.70 - 3.10 10*3/mm3    Monocytes, Absolute 0.28 0.10 - 0.90 10*3/mm3    Eosinophils, Absolute 0.03 0.00 - 0.40 10*3/mm3    Basophils, Absolute 0.02 0.00 - 0.20 10*3/mm3    Immature Grans, Absolute 0.04 0.00 - 0.05 10*3/mm3    nRBC 0.0 0.0 - 0.2 /100 WBC   Urinalysis With Culture If Indicated - Urine, Clean Catch    Collection Time: 03/21/23  10:24 AM    Specimen: Urine, Clean Catch   Result Value Ref Range    Color, UA Yellow Yellow, Straw    Appearance, UA Clear Clear    pH, UA 7.0 5.0 - 8.0    Specific Gravity, UA <=1.005 1.005 - 1.030    Glucose, UA Negative Negative    Ketones, UA Trace (A) Negative    Bilirubin, UA Negative Negative    Blood, UA Negative Negative    Protein, UA Negative Negative    Leuk Esterase, UA Negative Negative    Nitrite, UA Negative Negative    Urobilinogen, UA 0.2 E.U./dL 0.2 - 1.0 E.U./dL       Ordered the above labs and reviewed the results.        RADIOLOGY  CT Abdomen Pelvis Without Contrast    Result Date: 3/21/2023  CT ABDOMEN AND PELVIS WITHOUT CONTRAST  HISTORY: 27-year-old female with flank pain.  TECHNIQUE: Axial CT images of the abdomen and pelvis were obtained without administration of intravenous contrast. The patient was not given oral contrast. Coronal and sagittal reformats were obtained.  COMPARISON: None available.  FINDINGS: Bilateral adrenal glands are normal. There are no renal calculi or hydronephrosis. Bilateral ureters demonstrate normal caliber. The urinary bladder is partially distended and thick-walled limiting evaluation.  Noncontrast attenuation of liver is normal. The gallbladder, pancreas is unremarkable. The spleen measures 11.5 cm in craniocaudal dimension. No focal splenic lesion.  The uterus is anteverted and unremarkable. No abnormal adnexal mass. The small and large bowel loops demonstrate normal caliber. The appendix is not definitively identified however, there is no change to suggest appendicitis. Air-fluid levels within the right and transverse colon. Fluid-filled rectosigmoid colon. No appreciable bowel wall thickening.  No pathological retroperitoneal lymphadenopathy. Trace free fluid in the pelvis may be physiologic or reactive. No pleural or pericardial effusion. Sclerotic focus seen within the left iliac bone, nonspecific however, favored to represent a bone island.      1. No  renal calculi or hydronephrosis. 2. Fluid seen within the colon suggesting diarrhea. No appreciable bowel wall thickening.  Radiation dose reduction techniques were utilized, including automated exposure control and exposure modulation based on body size.         Ordered the above noted radiological studies. Reviewed by me in PACS.        MEDICATIONS GIVEN IN ER  Medications   ondansetron (ZOFRAN) injection 4 mg (4 mg Intravenous Given 3/21/23 0842)   ketorolac (TORADOL) injection 15 mg (15 mg Intravenous Given 3/21/23 0842)   lactated ringers bolus 1,000 mL (1,000 mL Intravenous New Bag 3/21/23 0842)           MEDICAL DECISION MAKING, PROGRESS, and CONSULTS    All labs have been independently reviewed by me.  All radiology studies have been reviewed by me and I have also reviewed the radiology report.   EKG's independently viewed and interpreted by me.  Discussion below represents my analysis of pertinent findings related to patient's condition, differential diagnosis, treatment plan and final disposition.    27-year-old female who presents with right-sided abdominal pain with nausea, vomiting.  Labs and imaging ordered and are unremarkable  Pain improved, tolerating p.o.  Will discharge home with Bentyl and Zofran for gastroenteritis      Shared decision making/consideration for admission: Symptoms improved, tolerating p.o. stable for discharge home      Orders placed during this visit:  Orders Placed This Encounter   Procedures   • CT Abdomen Pelvis Without Contrast   • hCG, Serum, Qualitative   • Comprehensive Metabolic Panel   • Lipase   • Urinalysis With Culture If Indicated - Urine, Clean Catch   • CBC Auto Differential   • CBC & Differential         Differential diagnosis include, but not limited to: small bowel obstruction, pyelonephritis, kidney stone, appendicitis      Additional orders considered but not ordered: n/a    Independent interpretation of labs, radiology studies, and discussions with  consultants:    Discussed with Dr. Gotti, who agrees with plan.     ED Course as of 03/21/23 1040   Tue Mar 21, 2023   1037 Updated patient on ED workup, including labs and imaging (if performed). We discussed follow up instructions and return precautions. The patient verbalizes understanding and is ready for discharge.  [JG]      ED Course User Index  [JG] Zuri Damian APRN             DIAGNOSIS  Final diagnoses:   Gastroenteritis         DISPOSITION  discharge      Latest Documented Vital Signs:  As of 10:40 EDT  BP- 112/72 HR- 109 Temp- 97.2 °F (36.2 °C) (Tympanic) O2 sat- 98%              --    Please note that portions of this were completed with a voice recognition program.       Note Disclaimer: At [facility], we believe that sharing information builds trust and better relationships. You are receiving this note because you are receiving care at [facility] or recently visited. It is possible you will see health information before a provider has talked with you about it. This kind of information can be easy to misunderstand. To help you fully understand what it means for your health, we urge you to discuss this note with your provider.           Zuri Damian APRN  03/21/23 1040

## 2023-05-15 ENCOUNTER — TELEPHONE (OUTPATIENT)
Dept: OBSTETRICS AND GYNECOLOGY | Facility: CLINIC | Age: 28
End: 2023-05-15
Payer: COMMERCIAL

## 2023-09-11 ENCOUNTER — OFFICE VISIT (OUTPATIENT)
Dept: OBSTETRICS AND GYNECOLOGY | Facility: CLINIC | Age: 28
End: 2023-09-11
Payer: COMMERCIAL

## 2023-09-11 VITALS
BODY MASS INDEX: 28.47 KG/M2 | WEIGHT: 145 LBS | DIASTOLIC BLOOD PRESSURE: 71 MMHG | HEIGHT: 60 IN | SYSTOLIC BLOOD PRESSURE: 119 MMHG

## 2023-09-11 DIAGNOSIS — N91.2 AMENORRHEA: ICD-10-CM

## 2023-09-11 DIAGNOSIS — Z87.42 HISTORY OF ABNORMAL CERVICAL PAP SMEAR: Primary | ICD-10-CM

## 2023-09-11 LAB
B-HCG UR QL: NEGATIVE
EXPIRATION DATE: NORMAL
INTERNAL NEGATIVE CONTROL: NEGATIVE
INTERNAL POSITIVE CONTROL: POSITIVE
Lab: NORMAL

## 2023-09-11 NOTE — PROGRESS NOTES
"Subjective    Chief Complaint   Patient presents with    Follow-up     Pt here for  repeat pap would like pregnancy test done due to late cycle       History of Present Illness    Fe Smith is a 28 y.o. female who presents for repeat Pap.  Patient had a history of atypical Pap with positive HPV but negative colposcopy and benign endocervical curetting.  Patient does want a blood pregnancy test.  Her UCG today was negative.    Obstetric History:  OB History          2    Para   2    Term   2       0    AB   0    Living   2         SAB   0    IAB   0    Ectopic   0    Molar   0    Multiple   0    Live Births   2               Menstrual History:     Patient's last menstrual period was 2023.       Past Medical History:   Diagnosis Date    Abnormal Pap smear of cervix     Chlamydia     3 years ago    Gestational hypertension     Migraine      History reviewed. No pertinent family history.    The following portions of the patient's history were reviewed and updated as appropriate: allergies, current medications, past medical history, past surgical history, and problem list.    Review of Systems  Negative       Objective   Physical Exam  Vagina clear.  Cervix without lesion.  Uterus normal size and shape.  Adnexa negative.  /71   Ht 152.4 cm (60\")   Wt 65.8 kg (145 lb)   LMP 2023   BMI 28.32 kg/m²     Assessment & Plan   Diagnoses and all orders for this visit:    1. History of abnormal cervical Pap smear (Primary)  -     IGP,rfx Aptima HPV All Pth    2. Amenorrhea  -     HCG, B-subunit, Quantitative        Return to office 6 months for annual exam with Pap.                "

## 2023-09-12 LAB — HCG INTACT+B SERPL-ACNC: <1 MIU/ML

## 2023-09-13 LAB
CONV .: NORMAL
CYTOLOGIST CVX/VAG CYTO: NORMAL
CYTOLOGY CVX/VAG DOC CYTO: NORMAL
CYTOLOGY CVX/VAG DOC THIN PREP: NORMAL
DX ICD CODE: NORMAL
HIV 1 & 2 AB SER-IMP: NORMAL
OTHER STN SPEC: NORMAL
STAT OF ADQ CVX/VAG CYTO-IMP: NORMAL

## 2023-12-22 ENCOUNTER — INITIAL PRENATAL (OUTPATIENT)
Dept: OBSTETRICS AND GYNECOLOGY | Facility: CLINIC | Age: 28
End: 2023-12-22
Payer: COMMERCIAL

## 2023-12-22 VITALS — BODY MASS INDEX: 28.83 KG/M2 | SYSTOLIC BLOOD PRESSURE: 118 MMHG | DIASTOLIC BLOOD PRESSURE: 66 MMHG | WEIGHT: 147.6 LBS

## 2023-12-22 DIAGNOSIS — Z34.91 INITIAL OBSTETRIC VISIT IN FIRST TRIMESTER: Primary | ICD-10-CM

## 2023-12-22 LAB
B-HCG UR QL: POSITIVE
EXPIRATION DATE: ABNORMAL
GLUCOSE UR STRIP-MCNC: NEGATIVE MG/DL
INTERNAL NEGATIVE CONTROL: ABNORMAL
INTERNAL POSITIVE CONTROL: ABNORMAL
Lab: ABNORMAL
PROT UR STRIP-MCNC: NEGATIVE MG/DL

## 2023-12-22 RX ORDER — VITAMIN A, ASCORBIC ACID, CHOLECALCIFEROL, .ALPHA.-TOCOPHEROL ACETATE, DL-, THIAMINE MONONITRATE, RIBOFLAVIN, NIACINAMIDE, PYRIDOXINE HYDROCHLORIDE, FOLIC ACID, CYANOCOBALAMIN, CALCIUM CARBONATE, IRON, ZINC OXIDE, AND CUPRIC OXIDE 4000; 120; 400; 22; 1.84; 3; 20; 10; 1; 12; 200; 29; 25; 2 [IU]/1; MG/1; [IU]/1; [IU]/1; MG/1; MG/1; MG/1; MG/1; MG/1; UG/1; MG/1; MG/1; MG/1; MG/1
1 TABLET ORAL DAILY
Qty: 30 TABLET | Refills: 11 | Status: SHIPPED | OUTPATIENT
Start: 2023-12-22

## 2023-12-22 RX ORDER — MAGNESIUM OXIDE 400 MG/1
400 TABLET ORAL DAILY
Qty: 30 TABLET | Refills: 11 | Status: SHIPPED | OUTPATIENT
Start: 2023-12-22

## 2023-12-22 RX ORDER — DIPHENHYDRAMINE HYDROCHLORIDE 25 MG/1
25 CAPSULE ORAL 3 TIMES DAILY
Qty: 90 TABLET | Refills: 3 | Status: SHIPPED | OUTPATIENT
Start: 2023-12-22

## 2023-12-22 RX ORDER — PROMETHAZINE HYDROCHLORIDE 12.5 MG/1
12.5 TABLET ORAL EVERY 6 HOURS PRN
Qty: 30 TABLET | Refills: 3 | Status: SHIPPED | OUTPATIENT
Start: 2023-12-22

## 2023-12-22 NOTE — PROGRESS NOTES
CC: Initial obstetric visit    HPI: 28-year-old  3 para 2 presents at 7-2/7 weeks by today's ultrasound for her initial obstetric visit.  Overall, she is feeling well.  She does have nausea and vomiting.  Her obstetric history is significant for 2 uncomplicated vaginal deliveries.  She had been taking Topamax for migraines, but stopped when she was diagnosed with pregnancy.    Review of systems    This is positive for nausea and vomiting.  Negative for fever or chills.  Negative for abdominal or pelvic pain.    Assessment and plan    1.  Intrauterine pregnancy at 7-2/7 weeks.  Prenatal vitamins prescribed.  The patient is uncertain of her last menstrual cycle.  We will use today's ultrasound for gestational dating.  2.  Prenatal counseling was undertaken and questions were answered.  3.  Migraines.  The patient has stopped Topamax.  We discussed the use of magnesium oxide and the patient would like to try this.  A prescription has been sent.  4.  Nausea and vomiting in pregnancy.  Counseled regarding strategies to minimize this such as frequent small meals and the use of hermes.  Also, vitamin B6 has been prescribed and the patient will be prescribed Phenergan for aches or vomiting.  5.  Counseled regarding genetic screening.  The patient would like to proceed with free fetal DNA testing when it is gestational age-appropriate.

## 2023-12-23 LAB
ABO GROUP BLD: ABNORMAL
BASOPHILS # BLD AUTO: 0 X10E3/UL (ref 0–0.2)
BASOPHILS NFR BLD AUTO: 0 %
BLD GP AB SCN SERPL QL: NEGATIVE
EOSINOPHIL # BLD AUTO: 0.1 X10E3/UL (ref 0–0.4)
EOSINOPHIL NFR BLD AUTO: 1 %
ERYTHROCYTE [DISTWIDTH] IN BLOOD BY AUTOMATED COUNT: 12.2 % (ref 11.7–15.4)
HBV SURFACE AG SERPL QL IA: NEGATIVE
HCT VFR BLD AUTO: 33.9 % (ref 34–46.6)
HCV IGG SERPL QL IA: NON REACTIVE
HGB BLD-MCNC: 11.9 G/DL (ref 11.1–15.9)
HIV 1+2 AB+HIV1 P24 AG SERPL QL IA: NON REACTIVE
IMM GRANULOCYTES # BLD AUTO: 0 X10E3/UL (ref 0–0.1)
IMM GRANULOCYTES NFR BLD AUTO: 0 %
LYMPHOCYTES # BLD AUTO: 1.7 X10E3/UL (ref 0.7–3.1)
LYMPHOCYTES NFR BLD AUTO: 25 %
MCH RBC QN AUTO: 31.4 PG (ref 26.6–33)
MCHC RBC AUTO-ENTMCNC: 35.1 G/DL (ref 31.5–35.7)
MCV RBC AUTO: 89 FL (ref 79–97)
MONOCYTES # BLD AUTO: 0.6 X10E3/UL (ref 0.1–0.9)
MONOCYTES NFR BLD AUTO: 8 %
NEUTROPHILS # BLD AUTO: 4.6 X10E3/UL (ref 1.4–7)
NEUTROPHILS NFR BLD AUTO: 66 %
PLATELET # BLD AUTO: 211 X10E3/UL (ref 150–450)
RBC # BLD AUTO: 3.79 X10E6/UL (ref 3.77–5.28)
RH BLD: POSITIVE
RPR SER QL: NON REACTIVE
RUBV IGG SERPL IA-ACNC: 1.38 INDEX
WBC # BLD AUTO: 6.9 X10E3/UL (ref 3.4–10.8)

## 2023-12-24 LAB
BACTERIA UR CULT: NORMAL
BACTERIA UR CULT: NORMAL

## 2023-12-27 LAB
AMPHETAMINES UR QL SCN: NEGATIVE NG/ML
BARBITURATES UR QL SCN: NEGATIVE NG/ML
BENZODIAZ UR QL: NEGATIVE NG/ML
BZE UR QL: NEGATIVE NG/ML
CARBOXYTHC UR QL CFM: POSITIVE
METHADONE UR QL SCN: NEGATIVE NG/ML
OPIATES UR QL: NEGATIVE NG/ML
PCP UR QL SCN: NEGATIVE NG/ML
PROPOXYPH UR QL SCN: NEGATIVE NG/ML

## 2023-12-28 LAB
C TRACH RRNA CVX QL NAA+PROBE: NEGATIVE
CYTOLOGIST CVX/VAG CYTO: NORMAL
CYTOLOGY CVX/VAG DOC CYTO: NORMAL
CYTOLOGY CVX/VAG DOC THIN PREP: NORMAL
DX ICD CODE: NORMAL
HIV 1 & 2 AB SER-IMP: NORMAL
HPV I/H RISK 4 DNA CVX QL PROBE+SIG AMP: NEGATIVE
N GONORRHOEA RRNA CVX QL NAA+PROBE: NEGATIVE
OTHER STN SPEC: NORMAL
STAT OF ADQ CVX/VAG CYTO-IMP: NORMAL
T VAGINALIS RRNA SPEC QL NAA+PROBE: NEGATIVE

## 2024-01-16 ENCOUNTER — ROUTINE PRENATAL (OUTPATIENT)
Dept: OBSTETRICS AND GYNECOLOGY | Facility: CLINIC | Age: 29
End: 2024-01-16
Payer: COMMERCIAL

## 2024-01-16 VITALS — BODY MASS INDEX: 29.72 KG/M2 | SYSTOLIC BLOOD PRESSURE: 116 MMHG | WEIGHT: 152.2 LBS | DIASTOLIC BLOOD PRESSURE: 64 MMHG

## 2024-01-16 DIAGNOSIS — Z3A.10 10 WEEKS GESTATION OF PREGNANCY: Primary | ICD-10-CM

## 2024-01-16 NOTE — PROGRESS NOTES
CC: Obstetric visit    HPI: 28-year-old  3 para 2 presents at 10-6/7 weeks for her obstetric visit.  She continues to have nausea and vomiting.  She now vomits several times daily.  She is eating frequent small meals.  She takes vitamin B6 and hermes as well as nighttime Phenergan.  She has only improved in the last 1 to 2 days.    Review of systems    This is positive for nausea and vomiting.  Negative for fever and chills.  Negative for vaginal bleeding.    Physical examination    The abdomen is soft and gravid.  There is no epigastric tenderness.  No fundal tenderness.  No suprapubic tenderness.  Extremities are equal in size    Assessment and plan    1.  Intrauterine pregnancy at 10-6/7 weeks  2.  Unable to Doppler fetal heart tones.  Counseled.  We will check an ultrasound to confirm viability.  3.  Counseled regarding genetic screening.  The patient would like to proceed with noninvasive prenatal screening today.  4.  Persistent nausea and vomiting.  Counseled.  The patient does not wish to go to the hospital for IV fluids.  We discussed adding Zofran to the patient's treatment regimen for daytime vomiting.  The patient declines this for now.  She has improved over the last several days.  5.  Prenatal labs were reviewed and discussed with the patient.  Drug screen was positive for marijuana.  The patient has stopped using marijuana.  6.  Migraines.  The patient is using magnesium oxide and this is helpful.

## 2024-01-19 DIAGNOSIS — Z30.9 ENCOUNTER FOR CONTRACEPTIVE MANAGEMENT, UNSPECIFIED TYPE: ICD-10-CM

## 2024-01-19 RX ORDER — ACETAMINOPHEN AND CODEINE PHOSPHATE 120; 12 MG/5ML; MG/5ML
1 SOLUTION ORAL DAILY
Qty: 84 TABLET | Refills: 3 | OUTPATIENT
Start: 2024-01-19 | End: 2025-01-18

## 2024-01-20 LAB
CFDNA.FET/CFDNA.TOTAL SFR FETUS: NORMAL %
CITATION REF LAB TEST: NORMAL
FET 13+18+21+X+Y ANEUP PLAS.CFDNA: NEGATIVE
FET CHR 21 TS PLAS.CFDNA QL: NEGATIVE
FET SEX PLAS.CFDNA DOSAGE CFDNA: NORMAL
FET TS 13 RISK PLAS.CFDNA QL: NEGATIVE
FET TS 18 RISK WBC.DNA+CFDNA QL: NEGATIVE
GA EST FROM CONCEPTION DATE: NORMAL D
GESTATIONAL AGE > 9:: YES
LAB DIRECTOR NAME PROVIDER: NORMAL
LAB DIRECTOR NAME PROVIDER: NORMAL
LABORATORY COMMENT REPORT: NORMAL
LIMITATIONS OF THE TEST: NORMAL
NEGATIVE PREDICTIVE VALUE: NORMAL
NOTE: NORMAL
PERFORMANCE CHARACTERISTICS: NORMAL
POSITIVE PREDICTIVE VALUE: NORMAL
REF LAB TEST METHOD: NORMAL
TEST PERFORMANCE INFO SPEC: NORMAL

## 2024-01-22 ENCOUNTER — TELEPHONE (OUTPATIENT)
Dept: OBSTETRICS AND GYNECOLOGY | Facility: CLINIC | Age: 29
End: 2024-01-22
Payer: COMMERCIAL

## 2024-01-22 NOTE — TELEPHONE ENCOUNTER
----- Message from LISA Casanova sent at 1/22/2024 12:16 PM EST -----  Dr. Jarad Berman pt. Please let the pt know that her genetic screening was negative. If she would like to know the gender, it's a girl. Thank you   ----- Message -----  From: Miko, Reflab Results In  Sent: 1/20/2024   8:35 PM EST  To: Adithya Abraham MD

## 2024-01-31 LAB
CFTR FULL MUT ANL BLD/T SEQ: NORMAL
CITATION REF LAB TEST: NORMAL
CLINICAL INFO: NORMAL
ETHNIC BACKGROUND STATED: NORMAL
GENE DIS ANL CARRIER INTERP-IMP: NORMAL
LAB DIRECTOR NAME PROVIDER: NORMAL
REASON FOR REFERRAL (NARRATIVE): NORMAL
RECOMMENDATION PATIENT DOC-IMP: NORMAL
REF LAB TEST METHOD: NORMAL
SERVICE CMNT-IMP: NORMAL
SPECIMEN SOURCE: NORMAL

## 2024-02-13 ENCOUNTER — ROUTINE PRENATAL (OUTPATIENT)
Dept: OBSTETRICS AND GYNECOLOGY | Facility: CLINIC | Age: 29
End: 2024-02-13
Payer: COMMERCIAL

## 2024-02-13 VITALS — BODY MASS INDEX: 29.22 KG/M2 | DIASTOLIC BLOOD PRESSURE: 77 MMHG | WEIGHT: 149.6 LBS | SYSTOLIC BLOOD PRESSURE: 114 MMHG

## 2024-02-13 DIAGNOSIS — O26.892 PELVIC PAIN AFFECTING PREGNANCY IN SECOND TRIMESTER, ANTEPARTUM: Primary | ICD-10-CM

## 2024-02-13 DIAGNOSIS — R10.2 PELVIC PAIN AFFECTING PREGNANCY IN SECOND TRIMESTER, ANTEPARTUM: Primary | ICD-10-CM

## 2024-02-19 RX ORDER — VITAMIN A, ASCORBIC ACID, CHOLECALCIFEROL, .ALPHA.-TOCOPHEROL ACETATE, DL-, THIAMINE MONONITRATE, RIBOFLAVIN, NIACINAMIDE, PYRIDOXINE HYDROCHLORIDE, FOLIC ACID, CYANOCOBALAMIN, CALCIUM CARBONATE, IRON, ZINC OXIDE, AND CUPRIC OXIDE 4000; 120; 400; 22; 1.84; 3; 20; 10; 1; 12; 200; 29; 25; 2 [IU]/1; MG/1; [IU]/1; [IU]/1; MG/1; MG/1; MG/1; MG/1; MG/1; UG/1; MG/1; MG/1; MG/1; MG/1
1 TABLET ORAL DAILY
Qty: 30 TABLET | Refills: 11 | Status: SHIPPED | OUTPATIENT
Start: 2024-02-19

## 2024-02-19 RX ORDER — DIPHENHYDRAMINE HYDROCHLORIDE 25 MG/1
25 CAPSULE ORAL 3 TIMES DAILY
Qty: 90 TABLET | Refills: 3 | Status: SHIPPED | OUTPATIENT
Start: 2024-02-19

## 2024-02-19 RX ORDER — MAGNESIUM OXIDE 400 MG/1
400 TABLET ORAL DAILY
Qty: 30 TABLET | Refills: 11 | Status: SHIPPED | OUTPATIENT
Start: 2024-02-19

## 2024-03-08 ENCOUNTER — TELEPHONE (OUTPATIENT)
Dept: OBSTETRICS AND GYNECOLOGY | Facility: CLINIC | Age: 29
End: 2024-03-08
Payer: COMMERCIAL

## 2024-03-08 RX ORDER — DOCUSATE SODIUM 100 MG/1
100 CAPSULE, LIQUID FILLED ORAL 2 TIMES DAILY
Qty: 60 CAPSULE | Refills: 11 | Status: SHIPPED | OUTPATIENT
Start: 2024-03-08

## 2024-03-08 NOTE — TELEPHONE ENCOUNTER
Caller: Fe Smith    Relationship: Self    Best call back number: [phone]    serious sharp pain left side  heavy pressure on pelvic area  constipation   PATIENT WANTS TO KNOW IF DR ROJAS WILL prescribe a stool softener TO TAKE DAILY.    OKAY TO LVM

## 2024-03-08 NOTE — TELEPHONE ENCOUNTER
Pt wanting to know if still softener can please be prescribed.   Pharmacy -   [id number] - [place], [place] - [address] AT [facility] [address] - [phone]  - [phone]        Please advise

## 2024-03-14 ENCOUNTER — ROUTINE PRENATAL (OUTPATIENT)
Dept: OBSTETRICS AND GYNECOLOGY | Facility: CLINIC | Age: 29
End: 2024-03-14
Payer: COMMERCIAL

## 2024-03-14 VITALS — SYSTOLIC BLOOD PRESSURE: 133 MMHG | DIASTOLIC BLOOD PRESSURE: 67 MMHG | BODY MASS INDEX: 30.66 KG/M2 | WEIGHT: 157 LBS

## 2024-03-14 DIAGNOSIS — Z3A.19 19 WEEKS GESTATION OF PREGNANCY: Primary | ICD-10-CM

## 2024-03-14 LAB
GLUCOSE UR STRIP-MCNC: NEGATIVE MG/DL
PROT UR STRIP-MCNC: NEGATIVE MG/DL

## 2024-03-14 RX ORDER — LANOLIN ALCOHOL/MO/W.PET/CERES
1 CREAM (GRAM) TOPICAL DAILY
COMMUNITY
Start: 2024-02-19

## 2024-03-14 NOTE — PROGRESS NOTES
CC: Obstetric visit    HPI: 28-year-old  3 para 2 presents at 19-1/7 weeks for her obstetric visit.  She has begun to experience fetal movement.  She continues to have constipation.  Has not been able to fill her Colace prescription due to cost issues.    Review of systems    This is negative for fever or chills.  Negative for nausea or vomiting.  Positive for constipation.    Physical examination    The abdomen is soft and gravid.  There is no epigastric tenderness.  No fundal tenderness.  No suprapubic tenderness.  Extremities are equal in size    Assessment and plan    1.  Intrauterine pregnancy at 19-1/7 weeks  2.  Pregnancy related constipation.  Counseled and questions answered.  Currently, the patient is using MiraLAX.  She has difficulty filling her Colace prescription due to cost issues.  I recommend [facility] EHR for assistance with resources and the patient agrees.  3.  Counseled regarding AFP testing.  The patient would like to proceed today.  4.  Screening ultrasound at the next visit.

## 2024-03-16 LAB
AFP INTERP SERPL-IMP: NORMAL
AFP INTERP SERPL-IMP: NORMAL
AFP MOM SERPL: 0.77
AFP SERPL-MCNC: 37.9 NG/ML
AGE AT DELIVERY: 28.2 YR
GA METHOD: NORMAL
GA: 19.1 WEEKS
IDDM PATIENT QL: NO
LABORATORY COMMENT REPORT: NORMAL
MULTIPLE PREGNANCY: NO
NEURAL TUBE DEFECT RISK FETUS: NORMAL %
RESULT: NORMAL

## 2024-04-10 ENCOUNTER — ROUTINE PRENATAL (OUTPATIENT)
Dept: OBSTETRICS AND GYNECOLOGY | Facility: CLINIC | Age: 29
End: 2024-04-10
Payer: COMMERCIAL

## 2024-04-10 VITALS — SYSTOLIC BLOOD PRESSURE: 119 MMHG | BODY MASS INDEX: 32.61 KG/M2 | WEIGHT: 167 LBS | DIASTOLIC BLOOD PRESSURE: 66 MMHG

## 2024-04-10 DIAGNOSIS — Z3A.23 23 WEEKS GESTATION OF PREGNANCY: Primary | ICD-10-CM

## 2024-04-10 DIAGNOSIS — R10.9 ABDOMINAL PAIN IN PREGNANCY, SECOND TRIMESTER: ICD-10-CM

## 2024-04-10 DIAGNOSIS — Z34.92 PRENATAL CARE IN SECOND TRIMESTER: ICD-10-CM

## 2024-04-10 DIAGNOSIS — Z13.1 SCREENING FOR DIABETES MELLITUS: ICD-10-CM

## 2024-04-10 DIAGNOSIS — Z13.0 SCREENING FOR IRON DEFICIENCY ANEMIA: ICD-10-CM

## 2024-04-10 DIAGNOSIS — K59.09 OTHER CONSTIPATION: ICD-10-CM

## 2024-04-10 DIAGNOSIS — O26.892 ABDOMINAL PAIN IN PREGNANCY, SECOND TRIMESTER: ICD-10-CM

## 2024-04-10 LAB
GLUCOSE UR STRIP-MCNC: NEGATIVE MG/DL
PROT UR STRIP-MCNC: NEGATIVE MG/DL

## 2024-04-10 NOTE — PROGRESS NOTES
Chief Complaint   Patient presents with    Routine Prenatal Visit     OB follow up     Fe Smith is a 28 y.o.  23w0d being seen today for her obstetrical visit.  Patient reports frequent left sided abdominal pain that radiates to vagina. She has been seeing chiropractor for this with improvement. She has maternity support belt, but does not wear routinely. She has been able to p/u colace and has noticed improvement in constipation. Reports irregular uterine cramping, none present today. Fetal movement: normal.    Review of Systems  Cramping/contractions: denies  Vaginal bleeding: denies  Fetal movement: normal    /66   Wt 75.8 kg (167 lb)   LMP 2023   BMI 32.61 kg/m²     Assessment/Plan    Diagnoses and all orders for this visit:    1. 23 weeks gestation of pregnancy (Primary)  -     POC Urinalysis Dipstick    2. Prenatal care in second trimester    3. Other constipation    4. Screening for diabetes mellitus  -     Gestational Screen 1 Hr ([facility]); Future    5. Screening for iron deficiency anemia  -     CBC & Differential    6. Abdominal pain in pregnancy, second trimester       Anatomy scan completed today: Normal appearing fetal anatomy, MVP 4.54 cm, Cervical length 5.50 cm, Anterior placenta with 3 vessel cord  BP normal range  Reviewed fetal kick counts  Reviewed S&S PTL  Encouraged increased hydration with water.   Discussed abdominal pain: likely musculoskeletal in nature. She has improvement in pain when she sees chiropractor. Recommend use of maternity support belt, call if no improvement or with worsening issues. Consider PT if no improvement  Constipation: Improving with colace  1 hr GTT at next visit  Reviewed this stage of pregnancy  Problem list updated     Follow up in 4 week(s) with Dr. Abraham    I spent 30 minutes caring for Fe on this date of service. This time includes time spent by me in the following activities: preparing for the visit, reviewing tests, obtaining  and/or reviewing a separately obtained history, performing a medically appropriate examination and/or evaluation, counseling and educating the patient/family/caregiver, ordering medications, tests, or procedures, referring and communicating with other health care professionals, documenting information in the medical record, and independently interpreting results and communicating that information with the patient/family/caregiver    LISA Casanova  4/10/2024  11:08 EDT

## 2024-05-07 ENCOUNTER — ROUTINE PRENATAL (OUTPATIENT)
Dept: OBSTETRICS AND GYNECOLOGY | Facility: CLINIC | Age: 29
End: 2024-05-07
Payer: COMMERCIAL

## 2024-05-07 VITALS — DIASTOLIC BLOOD PRESSURE: 79 MMHG | SYSTOLIC BLOOD PRESSURE: 129 MMHG | WEIGHT: 178 LBS | BODY MASS INDEX: 34.76 KG/M2

## 2024-05-07 DIAGNOSIS — Z3A.26 26 WEEKS GESTATION OF PREGNANCY: Primary | ICD-10-CM

## 2024-05-07 LAB
GLUCOSE UR STRIP-MCNC: NEGATIVE MG/DL
PROT UR STRIP-MCNC: NEGATIVE MG/DL

## 2024-05-07 PROCEDURE — 99213 OFFICE O/P EST LOW 20 MIN: CPT | Performed by: OBSTETRICS & GYNECOLOGY

## 2024-05-08 RX ORDER — FERROUS SULFATE 325(65) MG
325 TABLET ORAL
Qty: 30 TABLET | Refills: 2 | Status: SHIPPED | OUTPATIENT
Start: 2024-05-08

## 2024-06-04 ENCOUNTER — ROUTINE PRENATAL (OUTPATIENT)
Dept: OBSTETRICS AND GYNECOLOGY | Facility: CLINIC | Age: 29
End: 2024-06-04
Payer: COMMERCIAL

## 2024-06-04 VITALS — BODY MASS INDEX: 35.78 KG/M2 | SYSTOLIC BLOOD PRESSURE: 123 MMHG | DIASTOLIC BLOOD PRESSURE: 76 MMHG | WEIGHT: 183.2 LBS

## 2024-06-04 DIAGNOSIS — Z3A.30 30 WEEKS GESTATION OF PREGNANCY: Primary | ICD-10-CM

## 2024-06-04 PROCEDURE — 99214 OFFICE O/P EST MOD 30 MIN: CPT | Performed by: OBSTETRICS & GYNECOLOGY

## 2024-06-04 NOTE — PROGRESS NOTES
CC: Obstetric visit    HPI: 29-year-old  3 para 2 presents at 30-6/7 weeks for her obstetric visit.  Bowels and bladder are functioning normally.  Baby is moving actively.  The patient does report some shortness of breath when laying down at night.    Review of systems    This is negative for fever or chills.  Negative for nausea or vomiting.  Negative for vaginal bleeding.    Physical examination    The abdomen is soft and gravid.  There is no epigastric tenderness.  No fundal tenderness.  No right upper quadrant tenderness.  No suprapubic tenderness.  Extremities are equal in size    Assessment and plan    1.  Intrauterine pregnancy at 30-6/7 weeks  2.  The patient reports that her congestion has resolved with Claritin, but she does report shortness of breath at night.  We discussed the fact that the diaphragm has limited travel in the third trimester due to pressure from the abdomen due to the pregnancy.  The patient is concerned that her condition is worse than it was with the other 2 children and may be more than simply pregnancy changes.  I encouraged her to go directly to the emergency room where workup could be undertaken.  This could include a chest x-ray or even a CT angiogram if pulmonary embolism is suspected.  The patient will consider this.  3.  The patient would like to discuss anesthetic options other than an epidural.  We discussed the possibility of using morphine versus nitrous oxide versus self pyknosis combined with either of these methods versus the use of an epidural.

## 2024-06-11 ENCOUNTER — TELEPHONE (OUTPATIENT)
Dept: OBSTETRICS AND GYNECOLOGY | Facility: CLINIC | Age: 29
End: 2024-06-11
Payer: COMMERCIAL

## 2024-06-11 NOTE — TELEPHONE ENCOUNTER
----- Message from Adithya Abraham sent at 6/11/2024  3:45 PM EDT -----  I could not find an indication for an ultrasound her chart either.  I would be happy to see her first and determine if there is any need for it.  ----- Message -----  From: Kelley Plasencia  Sent: 6/11/2024  11:28 AM EDT  To: MD Dr Jarad Mehta Celestgi Smith is scheduled for an ultrasound on 6/18 and I do not see anything in her chart indicating you wanted her to have an ultrasound. Is this something you were wanting?    Kelley Maldonado

## 2024-06-18 ENCOUNTER — ROUTINE PRENATAL (OUTPATIENT)
Dept: OBSTETRICS AND GYNECOLOGY | Facility: CLINIC | Age: 29
End: 2024-06-18
Payer: COMMERCIAL

## 2024-06-18 VITALS — SYSTOLIC BLOOD PRESSURE: 119 MMHG | BODY MASS INDEX: 36.13 KG/M2 | WEIGHT: 185 LBS | DIASTOLIC BLOOD PRESSURE: 81 MMHG

## 2024-06-18 DIAGNOSIS — Z3A.32 32 WEEKS GESTATION OF PREGNANCY: Primary | ICD-10-CM

## 2024-06-18 PROCEDURE — 99213 OFFICE O/P EST LOW 20 MIN: CPT | Performed by: OBSTETRICS & GYNECOLOGY

## 2024-06-18 NOTE — PROGRESS NOTES
CC: Obstetric visit    HPI: 29-year-old  3 para 2 presents at 32-6/7 weeks for her obstetric visit.  Her baby is moving actively.      Review of systems    This is negative for fever or chills.  Negative for nausea or vomiting.  Negative for abdominal or pelvic pain.    Physical examination    The abdomen is soft and gravid.  There is no epigastric tenderness.  No fundal tenderness.  No suprapubic tenderness.  Extremities are equal in size with minimal pedal edema    Assessment and plan    1.  Intrauterine pregnancy at 32-6/7 weeks  2.  Episodes of shortness of breath, mostly at night.  We discussed the pathophysiology of this.  At the last visit, I had recommended an emergency room visit because the patient felt that this could be beyond normal for pregnancy.  The patient did not go to the emergency room and declines a visit today.  She reports that she is stable and is not extremely concerned about this.

## 2024-07-02 ENCOUNTER — ROUTINE PRENATAL (OUTPATIENT)
Dept: OBSTETRICS AND GYNECOLOGY | Facility: CLINIC | Age: 29
End: 2024-07-02
Payer: COMMERCIAL

## 2024-07-02 VITALS — BODY MASS INDEX: 37.11 KG/M2 | SYSTOLIC BLOOD PRESSURE: 125 MMHG | WEIGHT: 190 LBS | DIASTOLIC BLOOD PRESSURE: 88 MMHG

## 2024-07-02 DIAGNOSIS — Z3A.34 34 WEEKS GESTATION OF PREGNANCY: Primary | ICD-10-CM

## 2024-07-02 LAB
GLUCOSE UR STRIP-MCNC: NEGATIVE MG/DL
PROT UR STRIP-MCNC: NEGATIVE MG/DL

## 2024-07-02 NOTE — PROGRESS NOTES
CC: Obstetric visit    HPI: 29-year-old  3 para 2 presents for an obstetric visit.  Her baby is moving actively.  She had some contractions which were strong overnight.  These are better today.  She did have an episode of vaginal spotting and is concerned about this.  No further bleeding today.    Review of systems    This is positive for an episode of vaginal spotting.  Positive for contractions.  Negative for fever or chills.  Negative for nausea or vomiting.    Physical examination    The abdomen is soft and gravid.  There is no epigastric tenderness.  No fundal tenderness.  No CVA tenderness.  No suprapubic tenderness.  Pelvic examination reveals normal female external genitalia.  There is no blood in the vaginal vault.  The cervix is posterior, thick and closed.  At the conclusion of the exam, there was no blood on my glove.  Extremities are equal in size.    Assessment and plan    1.  Intrauterine pregnancy at 34-6/7 weeks  2.  Episode of spotting after contractions.  While this likely represents cervical softening from contractions, I feel that we should rule out intrauterine pathology.  An ultrasound will be performed today.  The patient agrees with this recommendation.

## 2024-07-06 ENCOUNTER — HOSPITAL ENCOUNTER (EMERGENCY)
Facility: HOSPITAL | Age: 29
Discharge: HOME | End: 2024-07-06
Attending: OBSTETRICS & GYNECOLOGY | Admitting: OBSTETRICS & GYNECOLOGY
Payer: COMMERCIAL

## 2024-07-06 VITALS
TEMPERATURE: 98.3 F | OXYGEN SATURATION: 100 % | HEIGHT: 59 IN | SYSTOLIC BLOOD PRESSURE: 99 MMHG | RESPIRATION RATE: 18 BRPM | HEART RATE: 92 BPM | BODY MASS INDEX: 38.38 KG/M2 | DIASTOLIC BLOOD PRESSURE: 46 MMHG

## 2024-07-06 LAB
ALBUMIN SERPL-MCNC: 3.3 G/DL (ref 3.5–5.2)
ALBUMIN/GLOB SERPL: 1.1 G/DL
ALP SERPL-CCNC: 93 U/L (ref 39–117)
ALT SERPL W P-5'-P-CCNC: 9 U/L (ref 1–33)
ANION GAP SERPL CALCULATED.3IONS-SCNC: 13.5 MMOL/L (ref 5–15)
AST SERPL-CCNC: 13 U/L (ref 1–32)
BASOPHILS # BLD AUTO: 0.03 10*3/MM3 (ref 0–0.2)
BASOPHILS NFR BLD AUTO: 0.3 % (ref 0–1.5)
BILIRUB SERPL-MCNC: 0.3 MG/DL (ref 0–1.2)
BILIRUB UR QL STRIP: NEGATIVE
BUN SERPL-MCNC: 7 MG/DL (ref 6–20)
BUN/CREAT SERPL: 15.2 (ref 7–25)
CALCIUM SPEC-SCNC: 8.6 MG/DL (ref 8.6–10.5)
CHLORIDE SERPL-SCNC: 104 MMOL/L (ref 98–107)
CLARITY UR: CLEAR
CO2 SERPL-SCNC: 19.5 MMOL/L (ref 22–29)
COLOR UR: YELLOW
CREAT SERPL-MCNC: 0.46 MG/DL (ref 0.57–1)
DEPRECATED RDW RBC AUTO: 41.3 FL (ref 37–54)
EGFRCR SERPLBLD CKD-EPI 2021: 133.9 ML/MIN/1.73
EOSINOPHIL # BLD AUTO: 0.06 10*3/MM3 (ref 0–0.4)
EOSINOPHIL NFR BLD AUTO: 0.6 % (ref 0.3–6.2)
ERYTHROCYTE [DISTWIDTH] IN BLOOD BY AUTOMATED COUNT: 12.5 % (ref 12.3–15.4)
GLOBULIN UR ELPH-MCNC: 3.1 GM/DL
GLUCOSE SERPL-MCNC: 80 MG/DL (ref 65–99)
GLUCOSE UR STRIP-MCNC: NEGATIVE MG/DL
HCT VFR BLD AUTO: 28.8 % (ref 34–46.6)
HGB BLD-MCNC: 9.9 G/DL (ref 12–15.9)
HGB UR QL STRIP.AUTO: NEGATIVE
IMM GRANULOCYTES # BLD AUTO: 0.06 10*3/MM3 (ref 0–0.05)
IMM GRANULOCYTES NFR BLD AUTO: 0.6 % (ref 0–0.5)
KETONES UR QL STRIP: ABNORMAL
LEUKOCYTE ESTERASE UR QL STRIP.AUTO: NEGATIVE
LYMPHOCYTES # BLD AUTO: 1.33 10*3/MM3 (ref 0.7–3.1)
LYMPHOCYTES NFR BLD AUTO: 13.8 % (ref 19.6–45.3)
MCH RBC QN AUTO: 31.5 PG (ref 26.6–33)
MCHC RBC AUTO-ENTMCNC: 34.4 G/DL (ref 31.5–35.7)
MCV RBC AUTO: 91.7 FL (ref 79–97)
MONOCYTES # BLD AUTO: 0.65 10*3/MM3 (ref 0.1–0.9)
MONOCYTES NFR BLD AUTO: 6.8 % (ref 5–12)
NEUTROPHILS NFR BLD AUTO: 7.49 10*3/MM3 (ref 1.7–7)
NEUTROPHILS NFR BLD AUTO: 77.9 % (ref 42.7–76)
NITRITE UR QL STRIP: NEGATIVE
NRBC BLD AUTO-RTO: 0 /100 WBC (ref 0–0.2)
PH UR STRIP.AUTO: 7.5 [PH] (ref 5–8)
PLATELET # BLD AUTO: 177 10*3/MM3 (ref 140–450)
PMV BLD AUTO: 10.5 FL (ref 6–12)
POTASSIUM SERPL-SCNC: 4 MMOL/L (ref 3.5–5.2)
PROT SERPL-MCNC: 6.4 G/DL (ref 6–8.5)
PROT UR QL STRIP: NEGATIVE
RBC # BLD AUTO: 3.14 10*6/MM3 (ref 3.77–5.28)
SODIUM SERPL-SCNC: 137 MMOL/L (ref 136–145)
SP GR UR STRIP: 1.01 (ref 1–1.03)
UROBILINOGEN UR QL STRIP: ABNORMAL
WBC NRBC COR # BLD AUTO: 9.62 10*3/MM3 (ref 3.4–10.8)

## 2024-07-06 PROCEDURE — 85025 COMPLETE CBC W/AUTO DIFF WBC: CPT | Performed by: OBSTETRICS & GYNECOLOGY

## 2024-07-06 PROCEDURE — 25810000003 LACTATED RINGERS PER 1000 ML: Performed by: OBSTETRICS & GYNECOLOGY

## 2024-07-06 PROCEDURE — 99284 EMERGENCY DEPT VISIT MOD MDM: CPT | Performed by: OBSTETRICS & GYNECOLOGY

## 2024-07-06 PROCEDURE — 80053 COMPREHEN METABOLIC PANEL: CPT | Performed by: OBSTETRICS & GYNECOLOGY

## 2024-07-06 PROCEDURE — 87086 URINE CULTURE/COLONY COUNT: CPT | Performed by: OBSTETRICS & GYNECOLOGY

## 2024-07-06 PROCEDURE — 59025 FETAL NON-STRESS TEST: CPT

## 2024-07-06 PROCEDURE — 81003 URINALYSIS AUTO W/O SCOPE: CPT | Performed by: OBSTETRICS & GYNECOLOGY

## 2024-07-06 RX ORDER — ACETAMINOPHEN 500 MG
500 TABLET ORAL EVERY 6 HOURS PRN
COMMUNITY

## 2024-07-06 RX ORDER — SODIUM CHLORIDE, SODIUM LACTATE, POTASSIUM CHLORIDE, CALCIUM CHLORIDE 600; 310; 30; 20 MG/100ML; MG/100ML; MG/100ML; MG/100ML
125 INJECTION, SOLUTION INTRAVENOUS CONTINUOUS
Status: DISCONTINUED | OUTPATIENT
Start: 2024-07-06 | End: 2024-07-06 | Stop reason: HOSPADM

## 2024-07-06 RX ADMIN — SODIUM CHLORIDE, POTASSIUM CHLORIDE, SODIUM LACTATE AND CALCIUM CHLORIDE 999 ML/HR: 600; 310; 30; 20 INJECTION, SOLUTION INTRAVENOUS at 15:08

## 2024-07-06 NOTE — OBED NOTES
SA[facility] Note OB        Patient Name: Fe Smith  YOB: 1995  MRN: [id number]  Admission Date: 2024  2:41 PM  Date of Service: 2024    Chief Complaint: Abdominal Pain ([facility]: pt was a transferred from [facility] ER for abdominal pain. Pt states that the pain is now mainly in her back and started around 0800 this am. +FM, Denies loss of fluid or bleeding. Denies complications in pregnancy. Pt also had complaints of shortness of breath. )        Subjective     Fe Smith is a 29 y.o. female  at 35w3d with Estimated Date of Delivery: 24 who presents with the chief complaint listed above.  She had constant sharp pelvic pain that started at 0800 this morning.  She now has left sided low back pain that she rates 5/10.  She is having shortness of breath.  She denies contractions, leaking fluid or vaginal bleeding currently.  She describes fetal movement as normal.  She states that she had some bleeding weeks ago.  She sees Adithya Abraham MD for her prenatal care. Her pregnancy has been complicated by anemia.    Shortly after external fetal monitor was placed, there was a deceleration in the fetal heart rate to the 90s for several minutes.  The patient was repositioned, given an oxygen mask and a fluid bolus was started.  The deceleration lasted approximately four minutes and then returned to a baseline of 120 bpm.             Objective   Patient Active Problem List    Diagnosis     Postpartum state [Z39.2]      (normal spontaneous vaginal delivery) [O80]     Admission for laboratory examination [Z01.89]     Migraine without aura and without status migrainosus, not intractable [G43.009]     Other constipation [K59.09]         OB History    Para Term  AB Living   3 2 2 0 0 2   SAB IAB Ectopic Molar Multiple Live Births   0 0 0 0 0 2      # Outcome Date GA Lbr Les/2nd Weight Sex Type Anes PTL Lv   3 Current            2 Term 20 38w3d / 00:13 2887 g (6 lb  5.8 oz) M Vag-Spont EPI N KEVIN      Birth Comments: scale 4      Name: MIREYA RILEY      Apgar1: 9  Apgar5: 9   1 Term 12/30/16 39w2d  3260 g (7 lb 3 oz) M Vag-Spont  N KEVIN        Past Medical History:   Diagnosis Date    Abnormal Pap smear of cervix     Chlamydia     3 years ago    Gestational hypertension     Migraine        Past Surgical History:   Procedure Laterality Date    WISDOM TOOTH EXTRACTION         No current facility-administered medications on file prior to encounter.     Current Outpatient Medications on File Prior to Encounter   Medication Sig Dispense Refill    acetaminophen (TYLENOL) 500 MG tablet Take 1 tablet by mouth Every 6 (Six) Hours As Needed for Mild Pain.      docusate sodium (Colace) 100 MG capsule Take 1 capsule by mouth 2 (Two) Times a Day. 60 capsule 11    ferrous sulfate 325 (65 FE) MG tablet Take 1 tablet by mouth Daily With Breakfast. 30 tablet 2    Magnesium Oxide -Mg Supplement 400 (240 Mg) MG tablet Take 1 tablet by mouth Daily.      Prenatal Vit-Iron Carbonyl-FA (Prenatal Plus Iron) 29-1 MG tablet Take 1 tablet by mouth Daily. 30 tablet 11    promethazine (PHENERGAN) 12.5 MG tablet Take 1 tablet by mouth Every 6 (Six) Hours As Needed for Vomiting. 30 tablet 3    vitamin B-6 (PYRIDOXINE) 25 MG tablet Take 1 tablet by mouth 3 times a day. 90 tablet 3       No Known Allergies    Family History   Problem Relation Age of Onset    Breast cancer Neg Hx     Ovarian cancer Neg Hx     Uterine cancer Neg Hx     Colon cancer Neg Hx        Social History     Socioeconomic History    Marital status: Single   Tobacco Use    Smoking status: Former     Types: Cigarettes   Vaping Use    Vaping status: Never Used   Substance and Sexual Activity    Alcohol use: No    Drug use: No    Sexual activity: Yes           Review of Systems   Constitutional: Negative.    HENT: Negative.     Respiratory: Negative.     Cardiovascular: Negative.    Gastrointestinal: Negative.    Genitourinary:  Positive for  pelvic pain.   Musculoskeletal:  Positive for back pain.   Neurological: Negative.         PHYSICAL EXAM:      VITAL SIGNS:  Vitals:    24 1452 24 1455 24 1500   BP: 118/70     Pulse: 92 86 89   Resp: 18     Temp: 98.3 °F (36.8 °C)     TempSrc: Oral     SpO2:  100% 99%        FHT:                   Baseline:  120 BPM  Variability:  Moderate = 6 - 25 BPM  Accelerations:  15 x 15 accelerations present     Decelerations:  present; decel to 90s for 4 minutes, reactive NST after recovery  Contractions:   absent     Interpretation:    Reactive NST after decel, CAT 1 tracing        PHYSICAL EXAM:    General: well developed; well nourished  no acute distress   Heart: Not performed.   Lungs  : breathing is unlabored     Abdomen: soft, non-tender; no masses       Cervix: was checked (by RN): 2 cm / 60 % / -2      Contractions: none        Extremities: peripheral pulses normal, no pedal edema, no clubbing or cyanosis      LABS AND TESTING ORDERED:  Uterine and fetal monitoring  Urinalysis  CBC, CMP    LAB RESULTS:    No results found for this or any previous visit (from the past 24 hour(s)).    Lab Results   Component Value Date    ABO A 2023    RH Positive 2023       Lab Results   Component Value Date    STREPGPB Negative 2020                 Assessment & Plan     ASSESSMENT/PLAN:  Fe Smith is a 29 y.o. female  at 35w3d who presented with: pelvic and low back pain      PLAN:     Pain likely maternal discomfort.  No evidence of labor.  Fetal monitoring for over two hours after deceleration was reassuring and reactive.  Patient discharged home with labor precautions  She will keep her scheduled outpatient prenatal appointment.    Plan discussed with patient and physician on call.    I have spent 30 minutes including face to face time with the patient, greater than 50% in discussion of the diagnosis (counseling) and/or coordination of care.     Chichi Salcido MD  2024  15:14  EDT  OB Hospitalist  Phone:  [phone]

## 2024-07-07 LAB — BACTERIA SPEC AEROBE CULT: NORMAL

## 2024-07-09 ENCOUNTER — ROUTINE PRENATAL (OUTPATIENT)
Dept: OBSTETRICS AND GYNECOLOGY | Facility: CLINIC | Age: 29
End: 2024-07-09
Payer: COMMERCIAL

## 2024-07-09 VITALS — BODY MASS INDEX: 38.78 KG/M2 | SYSTOLIC BLOOD PRESSURE: 125 MMHG | DIASTOLIC BLOOD PRESSURE: 83 MMHG | WEIGHT: 192 LBS

## 2024-07-09 DIAGNOSIS — Z3A.35 35 WEEKS GESTATION OF PREGNANCY: Primary | ICD-10-CM

## 2024-07-09 NOTE — PROGRESS NOTES
CC: Obstetric visit    HPI: 29-year-old  3 para 2 presents at 35-6/7 weeks for her obstetric visit.  She was in the emergency room 3 days ago with abdominal pain.  This was felt to be contraction related and it did resolve.  The patient also had nausea and vomiting which have resolved.  During her hospital evaluation, she had a fetal heart rate deceleration to the 90s.  This recovered and the patient developed a reactive fetal heart rate status after this.  She reports active movement.    Review of systems    This is negative for fever or chills.  Negative for nausea or vomiting.  Negative for pelvic or abdominal pain.    Physical examination    The abdomen is soft and gravid.  There is no epigastric tenderness.  No right upper quadrant tenderness.  No suprapubic tenderness.  No CVA tenderness.  Pelvic examination reveals normal female external genitalia.  There is no blood in the vaginal vault.  The cervix is posterior and 0.5 cm dilated  Extremities are equal in size    Assessment and plan    1.  Intrauterine pregnancy at 35-6/7 weeks  2.  Episode of contractions several days ago, now improved.  During that hospital workup, the patient had a prolonged fetal heart rate deceleration to the 90s.  The fetal heart rate recovered to category 1 status and the patient was ultimately discharged.  She reports active fetal movement since then.  Because of this episode, I recommend a biophysical profile with amniotic fluid index.  The patient agrees with this recommendation.  This will be performed today.  3.  Group B strep cultures next visit.

## 2024-07-16 ENCOUNTER — ROUTINE PRENATAL (OUTPATIENT)
Dept: OBSTETRICS AND GYNECOLOGY | Facility: CLINIC | Age: 29
End: 2024-07-16
Payer: COMMERCIAL

## 2024-07-16 VITALS — SYSTOLIC BLOOD PRESSURE: 134 MMHG | BODY MASS INDEX: 38.82 KG/M2 | DIASTOLIC BLOOD PRESSURE: 84 MMHG | WEIGHT: 192.2 LBS

## 2024-07-16 DIAGNOSIS — Z3A.36 36 WEEKS GESTATION OF PREGNANCY: Primary | ICD-10-CM

## 2024-07-16 LAB
GLUCOSE UR STRIP-MCNC: NEGATIVE MG/DL
PROT UR STRIP-MCNC: NEGATIVE MG/DL

## 2024-07-16 NOTE — PROGRESS NOTES
Acute visit    HPI: 29-year-old  3 para 2 presents at 36-6/7 weeks for her obstetric visit.  Her baby is moving actively.  She has occasional contractions.    Review of systems    This is negative for fever or chills.  Negative for nausea or vomiting.  Negative for abdominal or pelvic pain.    Physical examination    The abdomen is soft and gravid.  There is no epigastric tenderness.  No fundal tenderness.  No CVA tenderness.  No suprapubic tenderness.  Pelvic examination reveals normal female external genitalia.  There is no blood in the vaginal vault.  The cervix is 0.5 cm dilated  Extremities are equal in size    Assessment and plan    1.  Intrauterine pregnancy at 36-6/7 weeks  2.  Group B strep cultures performed.  Counseled.  3.  History of low fluid index with the last pregnancy.  Fluid index was normal last week.  We discussed repeating the biophysical profile.  The patient would like to do this next week.  This has been ordered.

## 2024-07-18 LAB — GP B STREP DNA SPEC QL NAA+PROBE: NEGATIVE

## 2024-07-22 ENCOUNTER — ANESTHESIA EVENT (OUTPATIENT)
Dept: LABOR AND DELIVERY | Facility: HOSPITAL | Age: 29
End: 2024-07-22
Payer: COMMERCIAL

## 2024-07-22 ENCOUNTER — ANESTHESIA (OUTPATIENT)
Dept: LABOR AND DELIVERY | Facility: HOSPITAL | Age: 29
End: 2024-07-22
Payer: COMMERCIAL

## 2024-07-22 ENCOUNTER — HOSPITAL ENCOUNTER (INPATIENT)
Facility: HOSPITAL | Age: 29
LOS: 2 days | Discharge: HOME | End: 2024-07-24
Attending: OBSTETRICS & GYNECOLOGY | Admitting: OBSTETRICS & GYNECOLOGY
Payer: COMMERCIAL

## 2024-07-22 PROBLEM — Z30.2 REQUEST FOR STERILIZATION: Status: ACTIVE | Noted: 2024-07-22

## 2024-07-22 PROBLEM — Z01.89: Status: RESOLVED | Noted: 2020-04-08 | Resolved: 2024-07-22

## 2024-07-22 PROBLEM — Z34.90 PREGNANCY: Status: ACTIVE | Noted: 2024-07-22

## 2024-07-22 PROBLEM — K59.09 OTHER CONSTIPATION: Status: RESOLVED | Noted: 2019-09-11 | Resolved: 2024-07-22

## 2024-07-22 PROBLEM — Z34.90 PREGNANCY: Status: RESOLVED | Noted: 2024-07-22 | Resolved: 2024-07-22

## 2024-07-22 PROBLEM — G43.009 MIGRAINE WITHOUT AURA AND WITHOUT STATUS MIGRAINOSUS, NOT INTRACTABLE: Status: RESOLVED | Noted: 2019-11-06 | Resolved: 2024-07-22

## 2024-07-22 PROBLEM — Z37.9 NORMAL LABOR: Status: ACTIVE | Noted: 2024-07-22

## 2024-07-22 LAB
ABO GROUP BLD: NORMAL
ALBUMIN SERPL-MCNC: 3.8 G/DL (ref 3.5–5.2)
ALBUMIN/GLOB SERPL: 1.1 G/DL
ALP SERPL-CCNC: 105 U/L (ref 39–117)
ALT SERPL W P-5'-P-CCNC: 11 U/L (ref 1–33)
AMPHET+METHAMPHET UR QL: NEGATIVE
ANION GAP SERPL CALCULATED.3IONS-SCNC: 15.8 MMOL/L (ref 5–15)
AST SERPL-CCNC: 17 U/L (ref 1–32)
BARBITURATES UR QL SCN: NEGATIVE
BASOPHILS # BLD AUTO: 0.03 10*3/MM3 (ref 0–0.2)
BASOPHILS NFR BLD AUTO: 0.3 % (ref 0–1.5)
BENZODIAZ UR QL SCN: NEGATIVE
BILIRUB SERPL-MCNC: 0.3 MG/DL (ref 0–1.2)
BLD GP AB SCN SERPL QL: NEGATIVE
BUN SERPL-MCNC: 10 MG/DL (ref 6–20)
BUN/CREAT SERPL: 19.2 (ref 7–25)
CALCIUM SPEC-SCNC: 9.3 MG/DL (ref 8.6–10.5)
CANNABINOIDS SERPL QL: NEGATIVE
CHLORIDE SERPL-SCNC: 105 MMOL/L (ref 98–107)
CO2 SERPL-SCNC: 16.2 MMOL/L (ref 22–29)
COCAINE UR QL: NEGATIVE
CREAT SERPL-MCNC: 0.52 MG/DL (ref 0.57–1)
CREAT UR-MCNC: 42.8 MG/DL
DEPRECATED RDW RBC AUTO: 41.2 FL (ref 37–54)
EGFRCR SERPLBLD CKD-EPI 2021: 130 ML/MIN/1.73
EOSINOPHIL # BLD AUTO: 0.11 10*3/MM3 (ref 0–0.4)
EOSINOPHIL NFR BLD AUTO: 0.9 % (ref 0.3–6.2)
ERYTHROCYTE [DISTWIDTH] IN BLOOD BY AUTOMATED COUNT: 12.6 % (ref 12.3–15.4)
FENTANYL UR-MCNC: NEGATIVE NG/ML
GLOBULIN UR ELPH-MCNC: 3.4 GM/DL
GLUCOSE SERPL-MCNC: 93 MG/DL (ref 65–99)
HCT VFR BLD AUTO: 31.9 % (ref 34–46.6)
HGB BLD-MCNC: 10.9 G/DL (ref 12–15.9)
IMM GRANULOCYTES # BLD AUTO: 0.11 10*3/MM3 (ref 0–0.05)
IMM GRANULOCYTES NFR BLD AUTO: 0.9 % (ref 0–0.5)
LYMPHOCYTES # BLD AUTO: 1.82 10*3/MM3 (ref 0.7–3.1)
LYMPHOCYTES NFR BLD AUTO: 15.3 % (ref 19.6–45.3)
MCH RBC QN AUTO: 31.1 PG (ref 26.6–33)
MCHC RBC AUTO-ENTMCNC: 34.2 G/DL (ref 31.5–35.7)
MCV RBC AUTO: 90.9 FL (ref 79–97)
METHADONE UR QL SCN: NEGATIVE
MONOCYTES # BLD AUTO: 0.91 10*3/MM3 (ref 0.1–0.9)
MONOCYTES NFR BLD AUTO: 7.6 % (ref 5–12)
NEUTROPHILS NFR BLD AUTO: 75 % (ref 42.7–76)
NEUTROPHILS NFR BLD AUTO: 8.94 10*3/MM3 (ref 1.7–7)
NRBC BLD AUTO-RTO: 0 /100 WBC (ref 0–0.2)
OPIATES UR QL: NEGATIVE
OXYCODONE UR QL SCN: NEGATIVE
PLATELET # BLD AUTO: 227 10*3/MM3 (ref 140–450)
PMV BLD AUTO: 10.8 FL (ref 6–12)
POTASSIUM SERPL-SCNC: 4 MMOL/L (ref 3.5–5.2)
PROT ?TM UR-MCNC: 12.5 MG/DL
PROT SERPL-MCNC: 7.2 G/DL (ref 6–8.5)
PROT/CREAT UR: 292.1 MG/G CREA (ref 0–200)
RBC # BLD AUTO: 3.51 10*6/MM3 (ref 3.77–5.28)
RH BLD: POSITIVE
SODIUM SERPL-SCNC: 137 MMOL/L (ref 136–145)
T&S EXPIRATION DATE: NORMAL
TREPONEMA PALLIDUM IGG+IGM AB [PRESENCE] IN SERUM OR PLASMA BY IMMUNOASSAY: NORMAL
WBC NRBC COR # BLD AUTO: 11.92 10*3/MM3 (ref 3.4–10.8)

## 2024-07-22 PROCEDURE — 80307 DRUG TEST PRSMV CHEM ANLYZR: CPT | Performed by: OBSTETRICS & GYNECOLOGY

## 2024-07-22 PROCEDURE — 85025 COMPLETE CBC W/AUTO DIFF WBC: CPT | Performed by: OBSTETRICS & GYNECOLOGY

## 2024-07-22 PROCEDURE — 86780 TREPONEMA PALLIDUM: CPT | Performed by: OBSTETRICS & GYNECOLOGY

## 2024-07-22 PROCEDURE — 80053 COMPREHEN METABOLIC PANEL: CPT | Performed by: OBSTETRICS & GYNECOLOGY

## 2024-07-22 PROCEDURE — C1755 CATHETER, INTRASPINAL: HCPCS | Performed by: STUDENT IN AN ORGANIZED HEALTH CARE EDUCATION/TRAINING PROGRAM

## 2024-07-22 PROCEDURE — 86900 BLOOD TYPING SEROLOGIC ABO: CPT | Performed by: OBSTETRICS & GYNECOLOGY

## 2024-07-22 PROCEDURE — 25810000003 LACTATED RINGERS PER 1000 ML: Performed by: OBSTETRICS & GYNECOLOGY

## 2024-07-22 PROCEDURE — 25010000002 TERBUTALINE PER 1 MG: Performed by: OBSTETRICS & GYNECOLOGY

## 2024-07-22 PROCEDURE — 86901 BLOOD TYPING SEROLOGIC RH(D): CPT | Performed by: OBSTETRICS & GYNECOLOGY

## 2024-07-22 PROCEDURE — 82570 ASSAY OF URINE CREATININE: CPT | Performed by: OBSTETRICS & GYNECOLOGY

## 2024-07-22 PROCEDURE — 59410 OBSTETRICAL CARE: CPT | Performed by: OBSTETRICS & GYNECOLOGY

## 2024-07-22 PROCEDURE — S0260 H&P FOR SURGERY: HCPCS | Performed by: OBSTETRICS & GYNECOLOGY

## 2024-07-22 PROCEDURE — 25010000002 ONDANSETRON PER 1 MG: Performed by: OBSTETRICS & GYNECOLOGY

## 2024-07-22 PROCEDURE — 84156 ASSAY OF PROTEIN URINE: CPT | Performed by: OBSTETRICS & GYNECOLOGY

## 2024-07-22 PROCEDURE — 25010000002 ROPIVACAINE PER 1 MG: Performed by: STUDENT IN AN ORGANIZED HEALTH CARE EDUCATION/TRAINING PROGRAM

## 2024-07-22 PROCEDURE — 86850 RBC ANTIBODY SCREEN: CPT | Performed by: OBSTETRICS & GYNECOLOGY

## 2024-07-22 RX ORDER — OXYTOCIN/0.9 % SODIUM CHLORIDE 30/500 ML
999 PLASTIC BAG, INJECTION (ML) INTRAVENOUS ONCE
Status: DISCONTINUED | OUTPATIENT
Start: 2024-07-22 | End: 2024-07-22 | Stop reason: HOSPADM

## 2024-07-22 RX ORDER — METHYLERGONOVINE MALEATE 0.2 MG/ML
200 INJECTION INTRAVENOUS ONCE AS NEEDED
Status: DISCONTINUED | OUTPATIENT
Start: 2024-07-22 | End: 2024-07-24 | Stop reason: HOSPADM

## 2024-07-22 RX ORDER — OXYTOCIN/0.9 % SODIUM CHLORIDE 30/500 ML
125 PLASTIC BAG, INJECTION (ML) INTRAVENOUS ONCE AS NEEDED
Status: DISCONTINUED | OUTPATIENT
Start: 2024-07-22 | End: 2024-07-24 | Stop reason: HOSPADM

## 2024-07-22 RX ORDER — ACETAMINOPHEN 325 MG/1
650 TABLET ORAL EVERY 4 HOURS PRN
Status: DISCONTINUED | OUTPATIENT
Start: 2024-07-22 | End: 2024-07-22 | Stop reason: SDUPTHER

## 2024-07-22 RX ORDER — DOCUSATE SODIUM 100 MG/1
100 CAPSULE, LIQUID FILLED ORAL 2 TIMES DAILY
Status: DISCONTINUED | OUTPATIENT
Start: 2024-07-22 | End: 2024-07-24 | Stop reason: HOSPADM

## 2024-07-22 RX ORDER — MISOPROSTOL 200 UG/1
600 TABLET ORAL ONCE AS NEEDED
Status: DISCONTINUED | OUTPATIENT
Start: 2024-07-22 | End: 2024-07-24 | Stop reason: HOSPADM

## 2024-07-22 RX ORDER — CALCIUM CARBONATE 500 MG/1
2 TABLET, CHEWABLE ORAL 3 TIMES DAILY PRN
Status: DISCONTINUED | OUTPATIENT
Start: 2024-07-22 | End: 2024-07-24 | Stop reason: HOSPADM

## 2024-07-22 RX ORDER — MORPHINE SULFATE 2 MG/ML
1 INJECTION, SOLUTION INTRAMUSCULAR; INTRAVENOUS EVERY 4 HOURS PRN
Status: DISCONTINUED | OUTPATIENT
Start: 2024-07-22 | End: 2024-07-22

## 2024-07-22 RX ORDER — SODIUM CHLORIDE, SODIUM LACTATE, POTASSIUM CHLORIDE, CALCIUM CHLORIDE 600; 310; 30; 20 MG/100ML; MG/100ML; MG/100ML; MG/100ML
125 INJECTION, SOLUTION INTRAVENOUS CONTINUOUS
Status: DISCONTINUED | OUTPATIENT
Start: 2024-07-22 | End: 2024-07-22

## 2024-07-22 RX ORDER — LIDOCAINE HYDROCHLORIDE AND EPINEPHRINE 15; 5 MG/ML; UG/ML
INJECTION, SOLUTION EPIDURAL AS NEEDED
Status: DISCONTINUED | OUTPATIENT
Start: 2024-07-22 | End: 2024-07-22 | Stop reason: SURG

## 2024-07-22 RX ORDER — ACETAMINOPHEN 325 MG/1
650 TABLET ORAL EVERY 6 HOURS PRN
Status: DISCONTINUED | OUTPATIENT
Start: 2024-07-22 | End: 2024-07-24 | Stop reason: HOSPADM

## 2024-07-22 RX ORDER — CARBOPROST TROMETHAMINE 250 UG/ML
250 INJECTION, SOLUTION INTRAMUSCULAR
Status: DISCONTINUED | OUTPATIENT
Start: 2024-07-22 | End: 2024-07-22

## 2024-07-22 RX ORDER — SODIUM CHLORIDE 0.9 % (FLUSH) 0.9 %
10 SYRINGE (ML) INJECTION AS NEEDED
Status: DISCONTINUED | OUTPATIENT
Start: 2024-07-22 | End: 2024-07-22

## 2024-07-22 RX ORDER — IBUPROFEN 600 MG/1
600 TABLET ORAL EVERY 6 HOURS PRN
Status: DISCONTINUED | OUTPATIENT
Start: 2024-07-22 | End: 2024-07-24 | Stop reason: HOSPADM

## 2024-07-22 RX ORDER — ONDANSETRON 2 MG/ML
4 INJECTION INTRAMUSCULAR; INTRAVENOUS EVERY 6 HOURS PRN
Status: DISCONTINUED | OUTPATIENT
Start: 2024-07-22 | End: 2024-07-24 | Stop reason: HOSPADM

## 2024-07-22 RX ORDER — TERBUTALINE SULFATE 1 MG/ML
0.25 INJECTION, SOLUTION SUBCUTANEOUS AS NEEDED
Status: DISCONTINUED | OUTPATIENT
Start: 2024-07-22 | End: 2024-07-22

## 2024-07-22 RX ORDER — EPHEDRINE SULFATE 50 MG/ML
5 INJECTION, SOLUTION INTRAVENOUS
Status: DISCONTINUED | OUTPATIENT
Start: 2024-07-22 | End: 2024-07-22

## 2024-07-22 RX ORDER — ONDANSETRON 2 MG/ML
4 INJECTION INTRAMUSCULAR; INTRAVENOUS ONCE AS NEEDED
Status: DISCONTINUED | OUTPATIENT
Start: 2024-07-22 | End: 2024-07-22

## 2024-07-22 RX ORDER — PRENATAL VIT/IRON FUM/FOLIC AC 27MG-0.8MG
1 TABLET ORAL DAILY
Status: DISCONTINUED | OUTPATIENT
Start: 2024-07-22 | End: 2024-07-24 | Stop reason: HOSPADM

## 2024-07-22 RX ORDER — TRANEXAMIC ACID 10 MG/ML
1000 INJECTION, SOLUTION INTRAVENOUS ONCE AS NEEDED
Status: DISCONTINUED | OUTPATIENT
Start: 2024-07-22 | End: 2024-07-24 | Stop reason: HOSPADM

## 2024-07-22 RX ORDER — FAMOTIDINE 10 MG/ML
20 INJECTION, SOLUTION INTRAVENOUS 2 TIMES DAILY PRN
Status: DISCONTINUED | OUTPATIENT
Start: 2024-07-22 | End: 2024-07-22

## 2024-07-22 RX ORDER — DIPHENHYDRAMINE HYDROCHLORIDE 50 MG/ML
12.5 INJECTION INTRAMUSCULAR; INTRAVENOUS EVERY 8 HOURS PRN
Status: DISCONTINUED | OUTPATIENT
Start: 2024-07-22 | End: 2024-07-22

## 2024-07-22 RX ORDER — MISOPROSTOL 200 UG/1
800 TABLET ORAL ONCE AS NEEDED
Status: DISCONTINUED | OUTPATIENT
Start: 2024-07-22 | End: 2024-07-22

## 2024-07-22 RX ORDER — SODIUM CHLORIDE 9 MG/ML
40 INJECTION, SOLUTION INTRAVENOUS AS NEEDED
Status: DISCONTINUED | OUTPATIENT
Start: 2024-07-22 | End: 2024-07-22

## 2024-07-22 RX ORDER — FAMOTIDINE 20 MG/1
20 TABLET, FILM COATED ORAL 2 TIMES DAILY PRN
Status: DISCONTINUED | OUTPATIENT
Start: 2024-07-22 | End: 2024-07-22

## 2024-07-22 RX ORDER — OXYTOCIN/0.9 % SODIUM CHLORIDE 30/500 ML
250 PLASTIC BAG, INJECTION (ML) INTRAVENOUS CONTINUOUS
Status: ACTIVE | OUTPATIENT
Start: 2024-07-22 | End: 2024-07-22

## 2024-07-22 RX ORDER — LIDOCAINE HYDROCHLORIDE 10 MG/ML
0.5 INJECTION, SOLUTION INFILTRATION; PERINEURAL ONCE AS NEEDED
Status: DISCONTINUED | OUTPATIENT
Start: 2024-07-22 | End: 2024-07-22

## 2024-07-22 RX ORDER — HYDROCORTISONE 25 MG/G
CREAM TOPICAL AS NEEDED
Status: DISCONTINUED | OUTPATIENT
Start: 2024-07-22 | End: 2024-07-24 | Stop reason: HOSPADM

## 2024-07-22 RX ORDER — NALOXONE HCL 0.4 MG/ML
0.4 VIAL (ML) INJECTION
Status: DISCONTINUED | OUTPATIENT
Start: 2024-07-22 | End: 2024-07-22

## 2024-07-22 RX ORDER — SODIUM CHLORIDE 0.9 % (FLUSH) 0.9 %
1-10 SYRINGE (ML) INJECTION AS NEEDED
Status: DISCONTINUED | OUTPATIENT
Start: 2024-07-22 | End: 2024-07-24 | Stop reason: HOSPADM

## 2024-07-22 RX ORDER — METHYLERGONOVINE MALEATE 0.2 MG/ML
200 INJECTION INTRAVENOUS ONCE AS NEEDED
Status: DISCONTINUED | OUTPATIENT
Start: 2024-07-22 | End: 2024-07-22

## 2024-07-22 RX ORDER — SODIUM CHLORIDE 0.9 % (FLUSH) 0.9 %
10 SYRINGE (ML) INJECTION EVERY 12 HOURS SCHEDULED
Status: DISCONTINUED | OUTPATIENT
Start: 2024-07-22 | End: 2024-07-22

## 2024-07-22 RX ORDER — ROPIVACAINE HYDROCHLORIDE 2 MG/ML
INJECTION, SOLUTION EPIDURAL; INFILTRATION; PERINEURAL AS NEEDED
Status: DISCONTINUED | OUTPATIENT
Start: 2024-07-22 | End: 2024-07-22 | Stop reason: SURG

## 2024-07-22 RX ORDER — OXYTOCIN/0.9 % SODIUM CHLORIDE 30/500 ML
2-20 PLASTIC BAG, INJECTION (ML) INTRAVENOUS
Status: DISCONTINUED | OUTPATIENT
Start: 2024-07-22 | End: 2024-07-22

## 2024-07-22 RX ORDER — TRAMADOL HYDROCHLORIDE 50 MG/1
50 TABLET ORAL EVERY 6 HOURS PRN
Status: DISCONTINUED | OUTPATIENT
Start: 2024-07-22 | End: 2024-07-24 | Stop reason: HOSPADM

## 2024-07-22 RX ORDER — ONDANSETRON 4 MG/1
4 TABLET, ORALLY DISINTEGRATING ORAL EVERY 6 HOURS PRN
Status: DISCONTINUED | OUTPATIENT
Start: 2024-07-22 | End: 2024-07-22

## 2024-07-22 RX ORDER — FAMOTIDINE 10 MG/ML
20 INJECTION, SOLUTION INTRAVENOUS ONCE AS NEEDED
Status: DISCONTINUED | OUTPATIENT
Start: 2024-07-22 | End: 2024-07-22

## 2024-07-22 RX ORDER — BISACODYL 10 MG
10 SUPPOSITORY, RECTAL RECTAL DAILY PRN
Status: DISCONTINUED | OUTPATIENT
Start: 2024-07-23 | End: 2024-07-24 | Stop reason: HOSPADM

## 2024-07-22 RX ORDER — ONDANSETRON 2 MG/ML
4 INJECTION INTRAMUSCULAR; INTRAVENOUS EVERY 6 HOURS PRN
Status: DISCONTINUED | OUTPATIENT
Start: 2024-07-22 | End: 2024-07-22

## 2024-07-22 RX ORDER — FENTANYL/ROPIVACAINE/NS/PF 2MCG/ML-.2
10 PLASTIC BAG, INJECTION (ML) INJECTION CONTINUOUS
Status: DISCONTINUED | OUTPATIENT
Start: 2024-07-22 | End: 2024-07-22

## 2024-07-22 RX ORDER — MAGNESIUM CARB/ALUMINUM HYDROX 105-160MG
30 TABLET,CHEWABLE ORAL ONCE AS NEEDED
Status: DISCONTINUED | OUTPATIENT
Start: 2024-07-22 | End: 2024-07-22

## 2024-07-22 RX ORDER — CARBOPROST TROMETHAMINE 250 UG/ML
250 INJECTION, SOLUTION INTRAMUSCULAR ONCE AS NEEDED
Status: DISCONTINUED | OUTPATIENT
Start: 2024-07-22 | End: 2024-07-24 | Stop reason: HOSPADM

## 2024-07-22 RX ADMIN — ROPIVACAINE HYDROCHLORIDE 5 ML: 2 INJECTION, SOLUTION EPIDURAL; INFILTRATION at 03:56

## 2024-07-22 RX ADMIN — SODIUM CHLORIDE, POTASSIUM CHLORIDE, SODIUM LACTATE AND CALCIUM CHLORIDE 999 ML/HR: 600; 310; 30; 20 INJECTION, SOLUTION INTRAVENOUS at 04:14

## 2024-07-22 RX ADMIN — ROPIVACAINE HYDROCHLORIDE 5 ML: 2 INJECTION, SOLUTION EPIDURAL; INFILTRATION at 04:00

## 2024-07-22 RX ADMIN — ONDANSETRON 4 MG: 2 INJECTION INTRAMUSCULAR; INTRAVENOUS at 07:06

## 2024-07-22 RX ADMIN — TERBUTALINE SULFATE 0.25 MG: 1 INJECTION, SOLUTION SUBCUTANEOUS at 04:11

## 2024-07-22 RX ADMIN — IBUPROFEN 600 MG: 600 TABLET, FILM COATED ORAL at 15:51

## 2024-07-22 RX ADMIN — TRAMADOL HYDROCHLORIDE 50 MG: 50 TABLET ORAL at 18:27

## 2024-07-22 RX ADMIN — Medication 10 ML/HR: at 04:00

## 2024-07-22 RX ADMIN — FAMOTIDINE 20 MG: 10 INJECTION INTRAVENOUS at 09:51

## 2024-07-22 RX ADMIN — Medication 10 ML/HR: at 12:48

## 2024-07-22 RX ADMIN — LIDOCAINE HYDROCHLORIDE AND EPINEPHRINE 3 ML: 15; 5 INJECTION, SOLUTION EPIDURAL at 03:54

## 2024-07-22 RX ADMIN — ACETAMINOPHEN 325MG 650 MG: 325 TABLET ORAL at 15:52

## 2024-07-22 RX ADMIN — Medication 2 MILLI-UNITS/MIN: at 07:55

## 2024-07-22 RX ADMIN — ONDANSETRON 4 MG: 2 INJECTION INTRAMUSCULAR; INTRAVENOUS at 12:36

## 2024-07-22 RX ADMIN — IBUPROFEN 600 MG: 600 TABLET, FILM COATED ORAL at 21:54

## 2024-07-22 RX ADMIN — SODIUM CHLORIDE, POTASSIUM CHLORIDE, SODIUM LACTATE AND CALCIUM CHLORIDE 125 ML/HR: 600; 310; 30; 20 INJECTION, SOLUTION INTRAVENOUS at 09:48

## 2024-07-22 RX ADMIN — DOCUSATE SODIUM 100 MG: 100 CAPSULE, LIQUID FILLED ORAL at 21:54

## 2024-07-22 NOTE — ANESTHESIA PROCEDURE NOTES
Labor Epidural      Patient reassessed immediately prior to procedure    Patient location during procedure: OB  Start Time: 7/22/2024 3:50 AM  Stop Time: 7/22/2024 3:54 AM  Performed By  Anesthesiologist: Davion Epstein MD  Preanesthetic Checklist  Completed: patient identified, IV checked, site marked, risks and benefits discussed, surgical consent, monitors and equipment checked, pre-op evaluation and timeout performed  Additional Notes  Labor epidural using Arrow kit, no heme/CSF aspirated, no paraesthesias.  Test dose with 3cc 1.5% lido with epi is negative.    Prep:  Pt Position:sitting  Sterile Tech:cap, gloves, mask and sterile barrier  Prep:chlorhexidine gluconate and isopropyl alcohol  Monitoring:blood pressure monitoring, continuous pulse oximetry and EKG  Epidural Block Procedure:  Approach:midline  Guidance:landmark technique and palpation technique  Location:L3-L4  Needle Type:Tuohy  Needle Gauge:17  Loss of Resistance Medium: air  Loss of Resistance: 6cm  Cath Depth at skin:11 cm  Paresthesia: none  Aspiration:negative  Test Dose:negative  Number of Attempts: 1  Post Assessment:  Dressing:occlusive dressing applied and secured with tape  Pt Tolerance:patient tolerated the procedure well with no apparent complications  Complications:no

## 2024-07-22 NOTE — OBED NOTES
"[facility]  Fe Smith  : 1995  MRN: [id number]  CSN: [id number]    OB ED Provider Note    Subjective   Chief Complaint   Patient presents with    Contractions     Started around 2300. 4-5 minutes apart. Denies loss of fluid/bleeding. Decreased movement within the last few hours.     Fe Smith is a 29 y.o. year old  with an Estimated Date of Delivery: 24 currently at 37w5d presenting with CTX every 4-5 min since 2300. She denies ROM or VB. FM is present    Prenatal care has been with Dr. Abraham.  It has been benign.    OB History    Para Term  AB Living   3 2 2 0 0 2   SAB IAB Ectopic Molar Multiple Live Births   0 0 0 0 0 2      # Outcome Date GA Lbr Les/2nd Weight Sex Type Anes PTL Lv   3 Current            2 Term 20 38w3d / 00:13 2887 g (6 lb 5.8 oz) M Vag-Spont EPI N KEVIN      Birth Comments: scale 4      Name: MIREYA SMITH      Apgar1: 9  Apgar5: 9   1 Term 16 39w2d  3260 g (7 lb 3 oz) M Vag-Spont  N KEVIN     Past Medical History:   Diagnosis Date    Abnormal Pap smear of cervix     Chlamydia     3 years ago    Gestational hypertension     Migraine      Past Surgical History:   Procedure Laterality Date    WISDOM TOOTH EXTRACTION       No current facility-administered medications for this encounter.    No Known Allergies  Social History    Tobacco Use      Smoking status: Former        Types: Cigarettes      Smokeless tobacco: Not on file    Review of Systems   Gastrointestinal:  Positive for abdominal pain.   All other systems reviewed and are negative.        Objective   /75 (BP Location: Right arm, Patient Position: Lying)   Pulse 108   Temp 97.8 °F (36.6 °C) (Oral)   Resp 16   Ht 149.9 cm (59\")   Wt 87.1 kg (192 lb)   LMP 2023   SpO2 95%   BMI 38.78 kg/m²   General: well developed; well nourished  moderately distressed   Abdomen: soft, non-tender; no masses  gravid    FHT's: reactive and cat 2 with 4.5 min decel salbador to 90s, " following tripled CTX      Cervix: was checked (by RN): 2.5 cm / 50 % / -3   Presentation: cephalic   Contractions: every 1-2  minutes with tripled CTX preceding decel   Chest: Unlabored respirations    CV:  Mild tachycardia, RR   Ext:   No C/C/E   Back: CVA tenderness is deferred bilateral        Prenatal Labs  Lab Results   Component Value Date    HGB 9.9 (L) 07/06/2024    RUBELLAABIGG 1.38 12/22/2023    HEPBSAG Negative 12/22/2023    ABSCRN Negative 12/22/2023    RUX4LMO2 Non Reactive 12/22/2023    HEPCVIRUSABY Non Reactive 12/22/2023    GCT 80 05/07/2024    STREPGPB Negative 07/16/2024    URINECX >100,000 CFU/mL Mixed Areli Isolated 07/06/2024    CHLAMNAA Negative 12/22/2023    NGONORRHON Negative 12/22/2023       Current Labs Reviewed   UA:    Lab Results   Component Value Date    SQUAMEPIUA 13-20 (A) 08/13/2021    SPECGRAVUR 1.013 07/06/2024    KETONESU 40 mg/dL (2+) (A) 07/06/2024    BLOODU Negative 07/06/2024    LEUKOCYTESUR Negative 07/06/2024    NITRITEU Negative 07/06/2024    RBCUA None Seen 08/13/2021    WBCUA 3-5 (A) 08/13/2021    BACTERIA None Seen 08/13/2021          Assessment   IUP at 37w5d  Probable early active labor- she was 0.5 cm in the office on 7/16. GBS negative  Prolonged deceleration- secondary to uterine hypertonicity. Fluid bolus started. Checking UDS  Gestational HTN Hx- checking urine PCR     Plan   Admit for iv hydration, fetal monitoring. Dr. Ricardo notified and is assuming management    Tye Mas MD  7/22/2024  02:52 EDT

## 2024-07-22 NOTE — LACTATION NOTE
P3. Term baby. Pt reports she attempted to BF with her other children but it didn't work. She reports pumping for 2 weeks with her 2nd baby before she decided to quit BF. She reports she is wanting to BF and has attempted. Baby is getting formula supplement. Encouraged to BF at least every 2-3 hours on both breasts before giving formula. Encouraged pumping if desired. Pt has ordered her personal pump and will check on that tomorrow. Encouraged to call for assistance as needed.    Lactation Consult Note    Evaluation Completed: 2024 18:51 EDT  Patient Name: Fe Smith  :  1995  MRN:  [id number]     REFERRAL  INFORMATION:                                         DELIVERY HISTORY:        Skin to skin initiation date/time: 2024  2:35 PM   Skin to skin end date/time: 2024  3:45 PM        MATERNAL ASSESSMENT:                               INFANT ASSESSMENT:  Information for the patient's :  Amee Smith [[id number]]   No past medical history on file.                                                                                                   MATERNAL INFANT FEEDING:                                                                       EQUIPMENT TYPE:                                 BREAST PUMPING:          LACTATION REFERRALS:

## 2024-07-22 NOTE — ANESTHESIA PREPROCEDURE EVALUATION
Anesthesia Evaluation     Patient summary reviewed and Nursing notes reviewed                Airway   Mallampati: II  TM distance: >3 FB  Neck ROM: full  Dental - normal exam     Pulmonary - negative pulmonary ROS   Cardiovascular     (+) hypertension      Neuro/Psych  (+) headaches  GI/Hepatic/Renal/Endo    (+) obesity    Musculoskeletal     Abdominal    Substance History      OB/GYN    (+) Pregnant, pregnancy induced hypertension        Other                          Anesthesia Plan    ASA 2     epidural     (37w5d    I have reviewed the patient's history with the patient and the chart, including all pertinent laboratory results and imaging. Risks of neuraxial anesthesia were discussed with patient including but not limited to: inadequate block, bleeding, infection, persistent numbness or weakness, nerve damage, painful dysesthesia and spinal headache.  )    Anesthetic plan, risks, benefits, and alternatives have been provided, discussed and informed consent has been obtained with: patient.        CODE STATUS:    Level Of Support Discussed With: Patient  Code Status (Patient has no pulse and is not breathing): CPR (Attempt to Resuscitate)  Medical Interventions (Patient has pulse or is breathing): Full Support

## 2024-07-22 NOTE — ANESTHESIA POSTPROCEDURE EVALUATION
Patient: Fe Smith    Procedure Summary       Date: 07/22/24 Room / Location:     Anesthesia Start: 0347 Anesthesia Stop: 1434    Procedure: LABOR ANALGESIA Diagnosis:     Scheduled Providers:  Provider: Davion Epstein MD    Anesthesia Type: epidural ASA Status: 2            Anesthesia Type: epidural    Vitals  Vitals Value Taken Time   /55 07/22/24 1515   Temp 36.8 °C (98.3 °F) 07/22/24 1443   Pulse 87 07/22/24 1515   Resp 16 07/22/24 1130   SpO2 99 % 07/22/24 1449   Vitals shown include unfiled device data.        Anesthesia Post Evaluation

## 2024-07-22 NOTE — PROGRESS NOTES
Care assumed from previous on-call physician.  Chart reviewed  Patient seen and examined.    Subjective: Patient reports feeling well after epidural.  Denies feeling contractions at this time.    Objective:  Temp:  [97.8 °F (36.6 °C)-98 °F (36.7 °C)] 98 °F (36.7 °C)  Heart Rate:  [] 107  Resp:  [16] 16  BP: ()/(32-83) 112/51    General: No acute distress, awake and oriented x3  Abdomen: Soft, gravid, no fundal tenderness  Cervical exam performed in the presence of female RN chaperone  Cervix: 3 cm dilated/50% effaced/-2/soft  Artificial rupture membranes performed with clear fluid noted.  IUPC placed without difficulty.  IUPC working well.    Lab Results   Component Value Date    WBC 11.92 (H) 2024    HGB 10.9 (L) 2024    HCT 31.9 (L) 2024    MCV 90.9 2024     2024       Lab Results   Component Value Date    GLUCOSE 93 2024    BUN 10 2024    CREATININE 0.52 (L) 2024    EGFR 130.0 2024    BCR 19.2 2024    K 4.0 2024    CO2 16.2 (L) 2024    CALCIUM 9.3 2024    ALBUMIN 3.8 2024    BILITOT 0.3 2024    AST 17 2024    ALT 11 2024       Diagnostic studies:  Most recent ultrasound (24):   Today's ultrasound shows an appropriately grown fetus with an estimated fetal weight of 5 pounds 13 ounces, 53rd percentile  Abdominal circumference is 89th percentile  Amniotic fluid index is normal  The placenta is normal with no retroplacental clot  No comparative data    NST: 120/reactive/moderate variability/no decelerations since last prolonged deceleration over 4 hours ago  Tocometry: Contractions every 3-5 minutes  Interpretation: Category 1    Assessment:  1.   29-year-old  3 para 2 at 37-5/7 weeks gestational age who presented in active labor  2.  Patient had 2 prolonged decelerations during her obstetrical emergency department visit.  She has not had any decelerations in the last 4 hours.  Tracing  currently category 1.  3.  GBS negative  4.  Request for sterilization    Plan:  1.  Patient was admitted for labor during the night.  She had had 2 prolonged decelerations during her initial evaluation, but none recently.  Will plan to proceed with labor augmentation.  Can start Pitocin for suboptimal contraction pattern.  Plan of care discussed with patient and family at length.  Anticipate vaginal delivery.  2.  Patient does have request for permanent sterilization.  Tubal consents are on the chart, signed on 2024.  If  section is necessary, can perform bilateral salpingectomy at the time of surgery.  Otherwise will need interval outpatient surgical sterilization procedure by her primary OB.

## 2024-07-22 NOTE — L&D DELIVERY NOTE
[facility]   Vaginal Delivery Note    Patient Name: Fe Smith  : 1995  MRN: [id number]    Date of Delivery: 2024     Diagnosis     Pre & Post-Delivery:  Intrauterine pregnancy at 37w5d  Labor status:      Normal labor    Request for sterilization     (spontaneous vaginal delivery)             Problem List    Transfer to Postpartum     Review the Delivery Report for details.     Delivery     Delivery: Vaginal, Spontaneous     YOB: 2023    Time of Birth:  Gestational Age 2:34 PM   37w5d     Anesthesia: Epidural     Delivering clinician: Davion Kwong    Forceps?   No   Vacuum? No    Shoulder dystocia present: No        Delivery narrative:  I came to the room when the cervix was completely dilated completely effaced and +2 station. Under continuous external fetal heart rate monitoring the patient was encouraged to push. She pushed for about 3 minutes. Fetal heart tones were reassuring while pushing. With good maternal effort, she delivered  viable female infant. Head presented in OA presentation and restituted to LOT. The right anterior fetal shoulder was easily delivered with a gentle floorward motion, followed by the posterior shoulder with gentle upward motion, followed by the remainder of the infant. Baby was immediately vigorous. Baby was placed on maternal abdomen and the cord was clamped after routine roughly 60 second delay. Cord blood was collected. Placenta delivered spontaneously intact and 3-vessel cord noted. The vagina, cervix, perineum and rectum were inspected for lacerations, and none were noted. Counts for needles, sponges, laps, and instruments were correct x 2 at the completion. Vaginal sweep and rectal exam perform. No sponges left in vagina. There were no complications. Mother and baby bonding well at the end of the delivery. Dr. Kwong was present and scrubbed for entire delivery.         Infant     Findings: female  infant     Infant  "observations: Weight: No birth weight on file.  Pending at the time of completion of the note  Length:   in  Observations/Comments:  LDR 5 panda      Apgars: 8  @ 1 minute /    9  @ 5 minutes         Placenta & Cord         Placenta delivered  Spontaneous  at   7/22/2024  2:37 PM     Cord: 3 vessels  present.   Nuchal Cord?  no   Cord blood obtained: Yes    Cord gases obtained:  No    Cord gas results: Venous:  No results found for: \"PHCVEN\", \"BECVEN\"    Arterial:  No results found for: \"PHCART\", \"BECART\"     Repair     Episiotomy: None     No    Lacerations: No   Estimated Blood Loss:       Quantitative Blood Loss:    QBL from VAG DEL: 25 (07/22/24 1444)     Complications     none    Disposition     Mother to Mother Baby/Postpartum  in stable condition currently.  Baby to remains with mom  in stable condition currently.    Davion Kwong MD  07/22/24  15:02 EDT        "

## 2024-07-22 NOTE — PLAN OF CARE
Problem: Adult Inpatient Plan of Care  Goal: Plan of Care Review  7/22/2024 0706 by Ruddy Myles RN  Outcome: Ongoing, Progressing  Flowsheets  Taken 7/22/2024 0706  Plan of Care Reviewed With: patient  Taken 7/22/2024 0652  Outcome Evaluation: last cervical exam 2-3/50/-3. patient comfortable with epidural.  7/22/2024 0706 by Ruddy Myles RN  Outcome: Ongoing, Progressing  Flowsheets  Taken 7/22/2024 0706  Plan of Care Reviewed With: patient  Taken 7/22/2024 0652  Progress: improving  Plan of Care Reviewed With: patient  Outcome Evaluation: last cervical exam 2-3/50/-3. patient comfortable with epidural.  Goal: Patient-Specific Goal (Individualized)  7/22/2024 0706 by Ruddy Myles RN  Outcome: Ongoing, Progressing  Flowsheets (Taken 7/22/2024 0706)  Patient-Specific Goals (Include Timeframe): healthy family upon discharge  Individualized Care Needs: MERVAT and pain control  Anxieties, Fears or Concerns: none stated  7/22/2024 0706 by Ruddy Myles RN  Outcome: Ongoing, Progressing  Flowsheets (Taken 7/22/2024 0706)  Patient-Specific Goals (Include Timeframe): healthy family upon discharge  Individualized Care Needs: MERVAT and pain control  Anxieties, Fears or Concerns: none stated  Goal: Absence of Hospital-Acquired Illness or Injury  7/22/2024 0706 by Ruddy Myles RN  Outcome: Ongoing, Progressing  7/22/2024 0706 by Ruddy Myles RN  Outcome: Ongoing, Progressing  Intervention: Identify and Manage Fall Risk  Description: Perform standard risk assessment on admission using a validated tool or comprehensive approach appropriate to the patient; reassess fall risk frequently, with change in status or transfer to another level of care.  Communicate fall injury risk to interprofessional healthcare team.  Determine need for increased observation, equipment and environmental modification, such as low bed, signage and supportive, nonskid footwear.  Adjust safety measures to individual developmental age, stage and identified  risk factors.  Reinforce the importance of safety and physical activity with patient and family.  Perform regular intentional rounding to assess need for position change, pain assessment and personal needs, including assistance with toileting.  Recent Flowsheet Documentation  Taken 7/22/2024 0534 by Ruddy Myles RN  Safety Promotion/Fall Prevention:   toileting scheduled   safety round/check completed   room organization consistent   fall prevention program maintained   clutter free environment maintained   assistive device/personal items within reach  Intervention: Prevent and Manage VTE (Venous Thromboembolism) Risk  Description: Assess for VTE (venous thromboembolism) risk.  Encourage and assist with early ambulation.  Initiate and maintain compression or other therapy, as indicated, based on identified risk in accordance with organizational protocol and provider order.  Encourage both active and passive leg exercises while in bed, if unable to ambulate.  Recent Flowsheet Documentation  Taken 7/22/2024 0534 by Ruddy Myles RN  Range of Motion: active ROM (range of motion) encouraged  Goal: Optimal Comfort and Wellbeing  7/22/2024 0706 by Ruddy Myles RN  Outcome: Ongoing, Progressing  7/22/2024 0706 by Ruddy Myles RN  Outcome: Ongoing, Progressing  Intervention: Provide Person-Centered Care  Description: Use a family-focused approach to care.  Develop trust and rapport by proactively providing information, encouraging questions, addressing concerns and offering reassurance.  Acknowledge emotional response to hospitalization.  Recognize and utilize personal coping strategies.  Honor spiritual and cultural preferences.  Recent Flowsheet Documentation  Taken 7/22/2024 0534 by Ruddy Myles RN  Trust Relationship/Rapport:   care explained   choices provided   emotional support provided   empathic listening provided   questions answered   questions encouraged   reassurance provided   thoughts/feelings  acknowledged  Goal: Readiness for Transition of Care  7/22/2024 0706 by Ruddy Myles, RN  Outcome: Ongoing, Progressing  7/22/2024 0706 by Ruddy Myles, RN  Outcome: Ongoing, Progressing  Intervention: Mutually Develop Transition Plan  Description: Identify available resources for support (e.g., family, friends, community).  Identify and address barriers to ongoing treatment and home management (e.g., environmental, financial).  Provide opportunities to practice self-management skills.  Assess and monitor emotional readiness for transition.  Establish or reconnect linkage with outpatient providers or community-based services.  Recent Flowsheet Documentation  Taken 7/22/2024 0256 by Ruddy Myles, RN  Equipment Currently Used at Home: none  Taken 7/22/2024 0252 by Ruddy Myles, RN  Transportation Anticipated: family or friend will provide  Patient/Family Anticipated Services at Transition: none  Patient/Family Anticipates Transition to: home with family   Goal Outcome Evaluation:  Plan of Care Reviewed With: patient        Progress: improving  Outcome Evaluation: last cervical exam 2-3/50/-3. patient comfortable with epidural.

## 2024-07-22 NOTE — H&P
[facility]  Obstetric History and Physical    Chief Complaint   Patient presents with    Contractions     Started around 2300. 4-5 minutes apart. Denies loss of fluid/bleeding. Decreased movement within the last few hours.       Subjective     Patient is a 29 y.o. female  currently at 37w5d, who presents with regular and painful contractions.  With cervical change to 2 to 3 cm in the OB ED from fingertip dilated several days ago in the office.  She also had a prolonged deceleration lasting about 4 minutes in the OB ED and is admitted for further management..    Her prenatal care is benign.  Her previous obstetric/gynecological history is noted for 2 prior vaginal deliveries . .    The following portions of the patients history were reviewed and updated as appropriate: current medications, allergies, past medical history, past surgical history, past family history, past social history, and problem list .       Prenatal Information:  Prenatal Results       Initial Prenatal Labs       Test Value Reference Range Date Time    Hemoglobin  11.9 g/dL 11.1 - 15.9 23 1356    Hematocrit  33.9 % 34.0 - 46.6 23 1356    Platelets  211 x10E3/uL 150 - 450 23 1356    Rubella IgG  1.38 index Immune >0.99 23 1356    Hepatitis B SAg  Negative  Negative 23 1356    Hepatitis C Ab  Non Reactive  Non Reactive 23 1356    RPR  Non Reactive  Non Reactive 23 1356    T. Pallidum Ab   Non-Reactive  Non-Reactive 24 0308    ABO  A   23 1356    Rh  Positive   23 1356    Antibody Screen  Negative  Negative 23 1356    HIV  Non Reactive  Non Reactive 23 1356    Urine Culture  >100,000 CFU/mL Mixed Areli Isolated   24 1721       Final report   23 1420    Gonorrhea  Negative  Negative 23 1419    Chlamydia  Negative  Negative 23 1419    TSH        HgB A1c         Varicella IgG        Hemoglobinopathy Fractionation        Hemoglobinopathy (genetic  testing)        Cystic fibrosis                   Fetal testing        Test Value Reference Range Date Time    NIPT        MSAFP  *Screen Negative*   03/14/24 0912    AFP-4                  2nd and 3rd Trimester       Test Value Reference Range Date Time    Hemoglobin (repeated)  10.9 g/dL 12.0 - 15.9 07/22/24 0308       9.9 g/dL 12.0 - 15.9 07/06/24 1527       10.3 g/dL 11.1 - 15.9 05/07/24 1145    Hematocrit (repeated)  31.9 % 34.0 - 46.6 07/22/24 0308       28.8 % 34.0 - 46.6 07/06/24 1527       29.9 % 34.0 - 46.6 05/07/24 1145    Platelets   227 10*3/mm3 140 - 450 07/22/24 0308       177 10*3/mm3 140 - 450 07/06/24 1527       213 x10E3/uL 150 - 450 05/07/24 1145       211 x10E3/uL 150 - 450 12/22/23 1356    1 hour GTT   80 mg/dL 70 - 139 05/07/24 1145    Antibody Screen (repeated)        3rd TM syphilis scrn (repeated)  RPR         3rd TM syphilis scrn (repeated) TP-Ab  Non-Reactive  Non-Reactive 07/22/24 0308    3rd TM syphilis screen TB-Ab (FTA)        Syphilis cascade test TP-Ab (EIA)        Syphilis cascade TPPA        GTT Fasting        GTT 1 Hr        GTT 2 Hr        GTT 3 Hr        Group B Strep  Negative  Negative 07/16/24               Other testing        Test Value Reference Range Date Time    Parvo IgG         CMV IgG                   Drug Screening       Test Value Reference Range Date Time    Amphetamine Screen  Negative ng/mL Jzsika=5370 12/22/23 1420    Barbiturate Screen  Negative  Negative 07/22/24 0308       Negative ng/mL Vnfjmq=827 12/22/23 1420    Benzodiazepine Screen  Negative  Negative 07/22/24 0308       Negative ng/mL Vlhjia=533 12/22/23 1420    Methadone Screen  Negative  Negative 07/22/24 0308       Negative ng/mL Fpmzbk=645 12/22/23 1420    Phencyclidine Screen  Negative ng/mL Cutoff=25 12/22/23 1420    Opiates Screen  Negative  Negative 07/22/24 0308       Negative ng/mL Tsnnws=629 12/22/23 1420    THC Screen  Negative  Negative 07/22/24 0308       Positive  Cutoff=50 12/22/23  1420    Cocaine Screen  Negative  Negative 07/22/24 0308       Negative ng/mL Tsjsdl=222 12/22/23 1420    Propoxyphene Screen  Negative ng/mL Raxmgb=748 12/22/23 1420    Buprenorphine Screen        Methamphetamine Screen        Oxycodone Screen  Negative  Negative 07/22/24 0308    Tricyclic Antidepressants Screen                  Legend    ^: Historical                          External Prenatal Results       Pregnancy Outside Results - Transcribed From Office Records - See Scanned Records For Details       Test Value Date Time    ABO  A  12/22/23 1356    Rh  Positive  12/22/23 1356    Antibody Screen  Negative  12/22/23 1356    Varicella IgG       Rubella  1.38 index 12/22/23 1356    Hgb  10.9 g/dL 07/22/24 0308       9.9 g/dL 07/06/24 1527       10.3 g/dL 05/07/24 1145       11.9 g/dL 12/22/23 1356    Hct  31.9 % 07/22/24 0308       28.8 % 07/06/24 1527       29.9 % 05/07/24 1145       33.9 % 12/22/23 1356    HgB A1c        1h GTT  80 mg/dL 05/07/24 1145    3h GTT Fasting       3h GTT 1 hour       3h GTT 2 hour       3h GTT 3 hour        Gonorrhea (discrete)  Negative  12/22/23 1419    Chlamydia (discrete)  Negative  12/22/23 1419    RPR  Non Reactive  12/22/23 1356    Syphilis Antibody       HBsAg  Negative  12/22/23 1356    Herpes Simplex Virus PCR       Herpes Simplex VIrus Culture       HIV  Non Reactive  12/22/23 1356    Hep C RNA Quant PCR       Hep C Antibody  Non Reactive  12/22/23 1356    AFP  37.9 ng/mL 03/14/24 0912    NIPT       Cystic Fibroisis        Group B Strep  Negative  07/16/24     GBS Susceptibility to Clindamycin       GBS Susceptibility to Erythromycin       Fetal Fibronectin       Genetic Testing, Maternal Blood                 Drug Screening       Test Value Date Time    Urine Drug Screen       Amphetamine Screen  Negative ng/mL 12/22/23 1420    Barbiturate Screen  Negative  07/22/24 0308       Negative ng/mL 12/22/23 1420    Benzodiazepine Screen  Negative  07/22/24 0308       Negative  ng/mL 23 1420    Methadone Screen  Negative  24 0308       Negative ng/mL 23 1420    Phencyclidine Screen  Negative ng/mL 23 1420    Opiates Screen  Negative  24 0308    THC Screen  Negative  24 0308    Cocaine Screen       Propoxyphene Screen  Negative ng/mL 23 1420    Buprenorphine Screen       Methamphetamine Screen       Oxycodone Screen  Negative  24 0308    Tricyclic Antidepressants Screen                 Legend    ^: Historical                             Past OB History:     OB History    Para Term  AB Living   3 2 2 0 0 2   SAB IAB Ectopic Molar Multiple Live Births   0 0 0 0 0 2      # Outcome Date GA Lbr Les/2nd Weight Sex Type Anes PTL Lv   3 Current            2 Term 20 38w3d / 00:13 2887 g (6 lb 5.8 oz) M Vag-Spont EPI N KEVIN      Birth Comments: scale 4      Name: MIREYA RILEY      Apgar1: 9  Apgar5: 9   1 Term 16 39w2d  3260 g (7 lb 3 oz) M Vag-Spont  N KEVIN       Past Medical History: Past Medical History:   Diagnosis Date    Abnormal Pap smear of cervix     Chlamydia     3 years ago    Gestational hypertension     Migraine       Past Surgical History Past Surgical History:   Procedure Laterality Date    WISDOM TOOTH EXTRACTION        Family History: Family History   Problem Relation Age of Onset    Breast cancer Neg Hx     Ovarian cancer Neg Hx     Uterine cancer Neg Hx     Colon cancer Neg Hx       Social History:  reports that she has quit smoking. Her smoking use included cigarettes. She does not have any smokeless tobacco history on file.   reports no history of alcohol use.   reports no history of drug use.        General ROS: Pertinent items are noted in HPI, all other systems reviewed and negative    Objective       Vital Signs Range for the last 24 hours  Temperature: Temp:  [97.8 °F (36.6 °C)] 97.8 °F (36.6 °C)   Temp Source: Temp src: Oral   BP: BP: (106-141)/(47-75) 106/47   Pulse: Heart Rate:  [] 86    Respirations: Resp:  [16] 16   SPO2: SpO2:  [95 %-100 %] 100 %   O2 Amount (l/min):     O2 Devices     Weight: Weight:  [87.1 kg (192 lb)] 87.1 kg (192 lb)     Physical Examination: General appearance - alert, well appearing, and in no distress and oriented to person, place, and time  Mental status - alert, oriented to person, place, and time, normal mood, behavior, speech, dress, motor activity, and thought processes  Heart - normal rate, regular rhythm, normal S1, S2, no murmurs, rubs, clicks or gallops  Abdomen - fundus is gravid and.soft, nontender, nondistended, no masses or organomegaly  Nontender.  Fundal height consistent with 37 weeks  Extremities - peripheral pulses normal, no pedal edema, no clubbing or cyanosis    Presentation: Cephalic   Cervix: Exam by: Method: sterile exam per RN   Dilation: Cervical Dilation (cm): 2-3   Effacement: Cervical Effacement: 50%   Station:         Fetal Heart Rate Assessment   Method: Fetal HR Assessment Method: external   Beats/min: Fetal HR (beats/min): 125   Baseline: Fetal HR Baseline: normal range   Variability: Fetal HR Variability: minimal (detectable, amplitude less than or equal to 5 bpm) (minimal to moderate)   Accels: Fetal HR Accelerations: greater than/equal to 15 bpm, lasting at least 15 seconds   Decels: Fetal HR Decelerations: prolonged   Tracing Category:       Uterine Assessment   Method: Method: external tocotransducer   Frequency (min): Contraction Frequency (Minutes): 1-5   Ctx Count in 10 min:     Duration:     Intensity: Contraction Intensity: moderate by palpation   Intensity by IUPC:     Resting Tone: Uterine Resting Tone: soft by palpation   Resting Tone by IUPC:     Gay Units:       Laboratory Results: Hemoglobin 10.9  Radiology Review: Growth ultrasound at 7/2/2024:   Today's ultrasound was ordered due to an episode of vaginal spotting     Today's ultrasound shows an appropriately grown fetus with an estimated fetal weight of 5 pounds  13 ounces, 53rd percentile  Abdominal circumference is 89th percentile  Amniotic fluid index is normal  The placenta is normal with no retroplacental clot  No comparative data  Other Studies: Biophysical profile on 2024 was 8/8    Assessment & Plan       Pregnancy        Assessment:  1.  Intrauterine pregnancy at 37w5d gestation with reactive, reassuring fetal status.  She has now had 2 prolonged variable decelerations, the last 1 being at 0413 just after having received her epidural.  It is currently category 1 with accelerations and moderate nypa-qf-frza variability.    2.  labor  without ROM  3.  Obstetrical history significant for is non-contributory.  4.  GBS status:   Strep Gp B JHONATHAN   Date Value Ref Range Status   2024 Negative Negative Final     Comment:     Centers for Disease Control and Prevention (CDC) and American Congress  of Obstetricians and Gynecologists (ACOG) guidelines for prevention of   group B streptococcal (GBS) disease specify co-collection of  a vaginal and rectal swab specimen to maximize sensitivity of GBS  detection. Per the CDC and ACOG, swabbing both the lower vagina and  rectum substantially increases the yield of detection compared with  sampling the vagina alone.  Penicillin G, ampicillin, or cefazolin are indicated for intrapartum  prophylaxis of  GBS colonization. Reflex susceptibility  testing should be performed prior to use of clindamycin only on GBS  isolates from penicillin-allergic women who are considered a high risk  for anaphylaxis. Treatment with vancomycin without additional testing  is warranted if resistance to clindamycin is noted.     5.  Desired permanent sterilization with tubal consent signed.    Plan:  1. Vaginal anticipated, fetal and uterine monitoring  continuously, expectant management, and analgesia with  epidural  2. Plan of care has been reviewed with patient and she agrees.  We discussed in the 2 prolonged variable  decelerations.  I told her that if this is a repetitive issue we cannot give any medication to help her contract such as Pitocin and that the decision to proceed with  would be likely.  She understands and would like to have  tubal sterilization  if a  is performed.  3.  Risks, benefits of treatment plan have been discussed.  4.  All questions have been answered.  5.  .      Gigi Ricardo MD  2024  04:43 EDT

## 2024-07-23 LAB
BASOPHILS # BLD AUTO: 0.03 10*3/MM3 (ref 0–0.2)
BASOPHILS NFR BLD AUTO: 0.3 % (ref 0–1.5)
BUPRENORPHINE UR QL: NEGATIVE NG/ML
DEPRECATED RDW RBC AUTO: 41 FL (ref 37–54)
EOSINOPHIL # BLD AUTO: 0.1 10*3/MM3 (ref 0–0.4)
EOSINOPHIL NFR BLD AUTO: 0.9 % (ref 0.3–6.2)
ERYTHROCYTE [DISTWIDTH] IN BLOOD BY AUTOMATED COUNT: 12.2 % (ref 12.3–15.4)
HCT VFR BLD AUTO: 28.9 % (ref 34–46.6)
HGB BLD-MCNC: 9.6 G/DL (ref 12–15.9)
IMM GRANULOCYTES # BLD AUTO: 0.08 10*3/MM3 (ref 0–0.05)
IMM GRANULOCYTES NFR BLD AUTO: 0.7 % (ref 0–0.5)
LYMPHOCYTES # BLD AUTO: 1.31 10*3/MM3 (ref 0.7–3.1)
LYMPHOCYTES NFR BLD AUTO: 12.1 % (ref 19.6–45.3)
MCH RBC QN AUTO: 30.4 PG (ref 26.6–33)
MCHC RBC AUTO-ENTMCNC: 33.2 G/DL (ref 31.5–35.7)
MCV RBC AUTO: 91.5 FL (ref 79–97)
MONOCYTES # BLD AUTO: 0.82 10*3/MM3 (ref 0.1–0.9)
MONOCYTES NFR BLD AUTO: 7.6 % (ref 5–12)
NEUTROPHILS NFR BLD AUTO: 78.4 % (ref 42.7–76)
NEUTROPHILS NFR BLD AUTO: 8.52 10*3/MM3 (ref 1.7–7)
NRBC BLD AUTO-RTO: 0 /100 WBC (ref 0–0.2)
PLATELET # BLD AUTO: 188 10*3/MM3 (ref 140–450)
PMV BLD AUTO: 10.8 FL (ref 6–12)
RBC # BLD AUTO: 3.16 10*6/MM3 (ref 3.77–5.28)
WBC NRBC COR # BLD AUTO: 10.86 10*3/MM3 (ref 3.4–10.8)

## 2024-07-23 PROCEDURE — 85025 COMPLETE CBC W/AUTO DIFF WBC: CPT | Performed by: OBSTETRICS & GYNECOLOGY

## 2024-07-23 PROCEDURE — 0503F POSTPARTUM CARE VISIT: CPT | Performed by: NURSE PRACTITIONER

## 2024-07-23 RX ORDER — FERROUS SULFATE 325(65) MG
325 TABLET ORAL
Status: DISCONTINUED | OUTPATIENT
Start: 2024-07-23 | End: 2024-07-24 | Stop reason: HOSPADM

## 2024-07-23 RX ORDER — TRAMADOL HYDROCHLORIDE 50 MG/1
50 TABLET ORAL EVERY 6 HOURS PRN
Qty: 9 TABLET | Refills: 0 | Status: SHIPPED | OUTPATIENT
Start: 2024-07-23

## 2024-07-23 RX ORDER — IBUPROFEN 600 MG/1
600 TABLET ORAL EVERY 6 HOURS PRN
Qty: 90 TABLET | Refills: 1 | Status: SHIPPED | OUTPATIENT
Start: 2024-07-23

## 2024-07-23 RX ADMIN — TRAMADOL HYDROCHLORIDE 50 MG: 50 TABLET ORAL at 05:04

## 2024-07-23 RX ADMIN — ACETAMINOPHEN 325MG 650 MG: 325 TABLET ORAL at 05:01

## 2024-07-23 RX ADMIN — ACETAMINOPHEN 325MG 650 MG: 325 TABLET ORAL at 16:05

## 2024-07-23 RX ADMIN — IBUPROFEN 600 MG: 600 TABLET, FILM COATED ORAL at 10:53

## 2024-07-23 RX ADMIN — IBUPROFEN 600 MG: 600 TABLET, FILM COATED ORAL at 21:24

## 2024-07-23 RX ADMIN — Medication 1 TABLET: at 08:11

## 2024-07-23 RX ADMIN — ACETAMINOPHEN 325MG 650 MG: 325 TABLET ORAL at 22:47

## 2024-07-23 RX ADMIN — FERROUS SULFATE TAB 325 MG (65 MG ELEMENTAL FE) 325 MG: 325 (65 FE) TAB at 10:53

## 2024-07-23 RX ADMIN — DOCUSATE SODIUM 100 MG: 100 CAPSULE, LIQUID FILLED ORAL at 08:11

## 2024-07-23 RX ADMIN — DOCUSATE SODIUM 100 MG: 100 CAPSULE, LIQUID FILLED ORAL at 21:24

## 2024-07-23 RX ADMIN — TRAMADOL HYDROCHLORIDE 50 MG: 50 TABLET ORAL at 18:24

## 2024-07-23 NOTE — LACTATION NOTE
"This note was copied from a baby's chart.  P3,T GHTN. Mom reports she attempts to bf every 3 hours but baby is \"sleepy and doesn't want to work for it.\" She supplements with formula and uses HP. Encouraged bf   attempts every 3 hours and follow with ebm and then formula. Review bf education and OPLC information. Call LC for any needs. Has PBP and LC number on WB.   "

## 2024-07-23 NOTE — PROGRESS NOTES
"Discharge Planning Assessment  [facility]     Patient Name: Fe Smith  MRN: [id number]  Today's Date: 7/23/2024    Admit Date: 7/22/2024    Plan: Infant may discharge to mother when medically ready; CSW will follow cord. ZAID Curran.   Discharge Needs Assessment    No documentation.                  Discharge Plan       Row Name 07/23/24 0959       Plan    Plan Infant may discharge to mother when medically ready; CSW will follow cord. ZAID Curran.    Plan Comments Mother: Fe Smith, MRN: [id number]; infant: AshleyGirl \"Violet\" Luis, MRN: [id number]. CSW consulted for \"Suggested on admission by best practice.\" Of note, mother's UDS was positive for THC prenatally on 12/22. Mother's UDS was negative on admit. Infant's UDS was missed; cord toxicology sent. CSW met with mother alone at bedside. Mother verified address, phone number, and insurance. Mother reports [employer] has spoken to her about adding infant to health insurance. Mother reports having a car seat, crib/bassinet, clothes, and diapers for infant. Mother has two other children: 7yr old & 4yr old, who are being cared for by maternal uncle to children during hospital stay. Mother reports, maternal grandma, paternal grandparents, maternal brother, father of infant/SO, and other family members are available for support as needed. Mother reports infant is following up with Dr. Grant after discharge; mother is comfortable scheduling appointments for infant and has reliable transportation. Mother is not current with [facility] but is familiar with the program. Mother denies any violence, threats, or feeling unsafe at home or relationship. CSW provided mother with a packet of resources including: WIC, HANDS, transportation, infant supplies, counseling, online support groups, postpartum mood and anxiety resources, and general community resources. CSW spent time building rapport with mother, and offered validation, support, and encouragement to mother " throughout assessment. Mother was polite and appropriate, and denied having unmet needs or concerns at this time. CSW will follow cord toxicology and complete mandated reporting to CPS if warranted. ZAID Curran.                  Continued Care and Services - Admitted Since 7/22/2024    No active coordination exists for this encounter.       Expected Discharge Date and Time       Expected Discharge Date Expected Discharge Time    Jul 23, 2024            Demographic Summary       Row Name 07/23/24 0958       General Information    Admission Type inpatient    Arrived From home    Referral Source nursing    Reason for Consult substance use concerns    General Information Comments Hx of THC use.                   Functional Status       Row Name 07/23/24 0958       Functional Status, IADL    Medications independent    Meal Preparation independent    Housekeeping independent    Laundry independent    Shopping independent       Mental Status    General Appearance WDL WDL       Mental Status Summary    Recent Changes in Mental Status/Cognitive Functioning no changes       Employment/    Employment Status employed full-time    Employment/ Comments [employer] [place]                   Psychosocial       Row Name 07/23/24 0958       Behavior WDL    Behavior WDL WDL       Emotion Mood WDL    Emotion/Mood/Affect WDL WDL       Speech WDL    Speech WDL WDL       Perceptual State WDL    Perceptual State WDL WDL       Thought Process WDL    Thought Process WDL WDL       Intellectual Performance WDL    Intellectual Performance WDL WDL       Coping/Stress    Major Change/Loss/Stressor birth    Patient Personal Strengths future/goal oriented;motivated;strong support system    Sources of Support parent(s);other family members;sibling(s);significant other                   Abuse/Neglect       Row Name 07/23/24 0959       Personal Safety    Feels Unsafe at Home or Work/School no    Feels Threatened by Someone no    Does  Anyone Try to Keep You From Having Contact with Others or Doing Things Outside Your Home? no    Physical Signs of Abuse Present no                   Legal    No documentation.                  Substance Abuse       Row Name 07/23/24 0959       Substance Use    Substance Use Comment Mom UDS neg. No infant UDS; cord tox sent.                   Patient Forms    No documentation.                     BHARGAVI Fitzpatrick

## 2024-07-23 NOTE — PLAN OF CARE
Goal Outcome Evaluation:      VSS, assessment WNL, voiding spontaneously, ambulating independently, pain controlled w/ medication, bottle feeding

## 2024-07-23 NOTE — PROGRESS NOTES
Postpartum Progress Note      Status post Vaginal Delivery: Doing well postoperatively.     1) Postpartum care immediately following delivery :    Routine care, continue current care. Desires discharge home today. Reviewed discharge instructions in detail    2) Maternal anemia: Acute on chronic. Hgb 10.9-->9.6. QBL 25 cc. She is asymptomatic. Adding PO iron.     Rh status: A+  Syphilis screen in hospital: nonreactive  Rubella: Immune  Gender: female    Subjective     Postpartum Day 1: Vaginal delivery    The patient feels well. The patient denies emotional concerns. Pain is moderately controlled with current medications. The baby is well. The patient is ambulating well. The patient is tolerating a normal diet.     Objective     Vital signs in last 24 hours:  Temp:  [97.3 °F (36.3 °C)-99 °F (37.2 °C)] 99 °F (37.2 °C)  Heart Rate:  [] 86  Resp:  [16] 16  BP: ()/(43-94) 119/78      General:    alert, appears stated age, and cooperative   CV: RRR, no m/r/g   Lungs: CTAB   Abdomen:  Soft, Non-tender    Lochia:  appropriate   Uterine Fundus:   firm   Ext    Edema trace   DVT Evaluation:  No evidence of DVT seen on physical exam.     Lab Results   Component Value Date    WBC 10.86 (H) 07/23/2024    HGB 9.6 (L) 07/23/2024    HCT 28.9 (L) 07/23/2024    MCV 91.5 07/23/2024     07/23/2024       Angle Fagan, APRN  7/23/2024  09:01 EDT

## 2024-07-23 NOTE — DISCHARGE SUMMARY
Date of Discharge:  7/23/2024    Discharge Diagnosis: s/p vaginal delivery     Presenting Problem/History of Present Illness  Pregnancy [Z34.90]  Pregnancy [Z34.90]     Hospital Course  Patient is a 29 y.o. female presented with labor.  Her pregnancy was uncomplicated.  She delivered a viable female infant weighing 6lb 1oz with apgars of 8&9. For further information surrounding this delivery please seen delivery note. Her postpartum course has been uncomplicated. She is voiding adequately, tolerating a regular diet, and she is ambulating without difficulty or assistance. Her pain is well controlled. We have reviewed discharge instructions in detail.     Procedures Performed         Consults:   Consults       No orders found for last 30 day(s).            Pertinent Test Results:   WBC   Date Value Ref Range Status   07/23/2024 10.86 (H) 3.40 - 10.80 10*3/mm3 Final     RBC   Date Value Ref Range Status   07/23/2024 3.16 (L) 3.77 - 5.28 10*6/mm3 Final     Hemoglobin   Date Value Ref Range Status   07/23/2024 9.6 (L) 12.0 - 15.9 g/dL Final     Hematocrit   Date Value Ref Range Status   07/23/2024 28.9 (L) 34.0 - 46.6 % Final     MCV   Date Value Ref Range Status   07/23/2024 91.5 79.0 - 97.0 fL Final     MCH   Date Value Ref Range Status   07/23/2024 30.4 26.6 - 33.0 pg Final     MCHC   Date Value Ref Range Status   07/23/2024 33.2 31.5 - 35.7 g/dL Final     RDW   Date Value Ref Range Status   07/23/2024 12.2 (L) 12.3 - 15.4 % Final     RDW-SD   Date Value Ref Range Status   07/23/2024 41.0 37.0 - 54.0 fl Final     MPV   Date Value Ref Range Status   07/23/2024 10.8 6.0 - 12.0 fL Final     Platelets   Date Value Ref Range Status   07/23/2024 188 140 - 450 10*3/mm3 Final     Neutrophil %   Date Value Ref Range Status   07/23/2024 78.4 (H) 42.7 - 76.0 % Final     Lymphocyte %   Date Value Ref Range Status   07/23/2024 12.1 (L) 19.6 - 45.3 % Final     Monocyte %   Date Value Ref Range Status   07/23/2024 7.6 5.0 - 12.0 %  Final     Eosinophil %   Date Value Ref Range Status   07/23/2024 0.9 0.3 - 6.2 % Final     Basophil %   Date Value Ref Range Status   07/23/2024 0.3 0.0 - 1.5 % Final     Immature Grans %   Date Value Ref Range Status   07/23/2024 0.7 (H) 0.0 - 0.5 % Final     Neutrophils, Absolute   Date Value Ref Range Status   07/23/2024 8.52 (H) 1.70 - 7.00 10*3/mm3 Final     Lymphocytes, Absolute   Date Value Ref Range Status   07/23/2024 1.31 0.70 - 3.10 10*3/mm3 Final     Monocytes, Absolute   Date Value Ref Range Status   07/23/2024 0.82 0.10 - 0.90 10*3/mm3 Final     Eosinophils, Absolute   Date Value Ref Range Status   07/23/2024 0.10 0.00 - 0.40 10*3/mm3 Final     Basophils, Absolute   Date Value Ref Range Status   07/23/2024 0.03 0.00 - 0.20 10*3/mm3 Final     Immature Grans, Absolute   Date Value Ref Range Status   07/23/2024 0.08 (H) 0.00 - 0.05 10*3/mm3 Final     nRBC   Date Value Ref Range Status   07/23/2024 0.0 0.0 - 0.2 /100 WBC Final       Condition on Discharge:  Stable    Vital Signs  Temp:  [97.3 °F (36.3 °C)-99 °F (37.2 °C)] 99 °F (37.2 °C)  Heart Rate:  [] 86  Resp:  [16] 16  BP: ()/(43-94) 119/78    Physical Exam:   See Progress Note    Discharge Disposition  Home or Self Care    Discharge Medications     Discharge Medications        New Medications        Instructions Start Date   ibuprofen 600 MG tablet  Commonly known as: ADVIL,MOTRIN   600 mg, Oral, Every 6 Hours PRN      traMADol 50 MG tablet  Commonly known as: ULTRAM   50 mg, Oral, Every 6 Hours PRN             Continue These Medications        Instructions Start Date   docusate sodium 100 MG capsule  Commonly known as: Colace   100 mg, Oral, 2 Times Daily      ferrous sulfate 325 (65 FE) MG tablet   325 mg, Oral, Daily With Breakfast      Prenatal Plus Iron 29-1 MG tablet   1 tablet, Oral, Daily               Discharge Diet: Regular    Activity at Discharge: Pelvic rest X6 weeks    Education: Warning signs and symptoms given, no tub  baths, nothing in the vagina for 6 weeks, no driving for 2 weeks or while talking narcotics     Follow-up Appointments  No future appointments.  Additional Instructions for the Follow-ups that You Need to Schedule       Discharge Follow-up with Specified Provider: Jarad; 6 Weeks   As directed      To: Jarad   Follow Up: 6 Weeks   Follow Up Details: Postpartum follow up                Test Results Pending at Discharge  Pending Labs       Order Current Status    Buprenorphine Screen Urine - Urine, Clean Catch In process    URINE DRUG SCREEN PLUS BUPRENORPHINE - In process             LISA Casanova  07/23/24  09:05 EDT    Time: 09:05 EDT

## 2024-07-23 NOTE — LACTATION NOTE
This note was copied from a baby's chart.  Mom reports that baby has only latched twice since delivery.  Has been pumping with manual pump every 3 hours for 30 min each breast and not getting much.  Giving formula supplement.  Educated on attempting to latch first,,then pumping 15-20 min each side and to feed any colostrum to baby first then formula.  Encouraged to call LC for latch assistance next feeding.  Mom needs assist in getting a pump..  She requested a personal pump with [employer] but their computer is down and not able to cancel order.  A [employer] was sent for pump and note written regarding unable to cancel order with [employer].

## 2024-07-24 ENCOUNTER — TELEPHONE (OUTPATIENT)
Dept: OBSTETRICS AND GYNECOLOGY | Facility: CLINIC | Age: 29
End: 2024-07-24

## 2024-07-24 VITALS
RESPIRATION RATE: 16 BRPM | WEIGHT: 192 LBS | HEART RATE: 82 BPM | BODY MASS INDEX: 38.71 KG/M2 | SYSTOLIC BLOOD PRESSURE: 126 MMHG | HEIGHT: 59 IN | DIASTOLIC BLOOD PRESSURE: 82 MMHG | OXYGEN SATURATION: 99 % | TEMPERATURE: 97.9 F

## 2024-07-24 PROBLEM — Z37.9 NORMAL LABOR: Status: RESOLVED | Noted: 2024-07-22 | Resolved: 2024-07-24

## 2024-07-24 PROCEDURE — 0503F POSTPARTUM CARE VISIT: CPT

## 2024-07-24 RX ADMIN — FERROUS SULFATE TAB 325 MG (65 MG ELEMENTAL FE) 325 MG: 325 (65 FE) TAB at 08:41

## 2024-07-24 RX ADMIN — Medication 1 TABLET: at 08:41

## 2024-07-24 RX ADMIN — TRAMADOL HYDROCHLORIDE 50 MG: 50 TABLET ORAL at 00:21

## 2024-07-24 RX ADMIN — IBUPROFEN 600 MG: 600 TABLET, FILM COATED ORAL at 08:41

## 2024-07-24 RX ADMIN — DOCUSATE SODIUM 100 MG: 100 CAPSULE, LIQUID FILLED ORAL at 08:41

## 2024-07-24 NOTE — PLAN OF CARE
Goal Outcome Evaluation:         PP day 2. VSS, up/ambulating, voiding, pain controlled with PO medication. Bleeding WNL. D/c pending

## 2024-07-24 NOTE — PLAN OF CARE
Goal Outcome Evaluation: Improving   VSS. Uterus contracted. Lochia is scant. Ambulating. Voiding freely.

## 2024-07-24 NOTE — DISCHARGE SUMMARY
VAGINAL DELIVERY DISCHARGE SUMMARY      PATIENT: Fe Smith        MR#:[id number]  LOCATION: [facility]  ADMISSION  DIAGNOSIS:    (spontaneous vaginal delivery)    Request for sterilization    Postpartum anemia     DISCHARGE DIAGNOSIS:   1.  (spontaneous vaginal delivery)      Patient Active Problem List   Diagnosis    Request for sterilization     (spontaneous vaginal delivery)    Postpartum anemia       SERVICE: Obstetrics    DATE OF ADMISSION: 2024  DATE OF DISCHARGE: 24        (spontaneous vaginal delivery)    Request for sterilization    Postpartum anemia        PROCEDURES:  Vaginal, Spontaneous     2024    2:34 PM      RH STATUS: A positive  SYPHILIS SCREEN DELIVERY ADMIT: treponemal antibody non-reactive upon admission  RUBELLA: immune  VARICELLA: unknown immunity  INFANT GENDER: female    HOSPITAL COURSE: Fe underwent vaginal delivery of a female infant and remained in the hospital for 2 days. During that time, Fe remained afebrile and hemodynamically stable. On the day of discharge, Fe was eating, ambulating and voiding without difficulty.      LABS:   Lab Results   Component Value Date    WBC 10.86 (H) 2024    HGB 9.6 (L) 2024    HCT 28.9 (L) 2024    MCV 91.5 2024     2024    GLU 92 2021    CREATININE 0.52 (L) 2024    AST 17 2024    ALT 11 2024     Results from last 7 days   Lab Units 24  0308   ABO TYPING  A   RH TYPING  Positive   ANTIBODY SCREEN  Negative       DISCHARGE MEDICATIONS     Discharge Medications        New Medications        Instructions Start Date   ibuprofen 600 MG tablet  Commonly known as: ADVIL,MOTRIN   600 mg, Oral, Every 6 Hours PRN      traMADol 50 MG tablet  Commonly known as: ULTRAM   50 mg, Oral, Every 6 Hours PRN             Continue These Medications        Instructions Start Date   docusate sodium 100 MG capsule  Commonly known as: Colace   100 mg, Oral, 2 Times  Daily      ferrous sulfate 325 (65 FE) MG tablet   325 mg, Oral, Daily With Breakfast      Prenatal Plus Iron 29-1 MG tablet   1 tablet, Oral, Daily               DISCHARGE DISPOSITION: Home    DISCHARGE CONDITION: Stable    DISCHARGE DIET: Regular    ACTIVITY AT DISCHARGE: Pelvic rest    INFANT FEEDING PLANS: Breast    EDUCATION: Warning signs and symptoms given, no tub baths, nothing in the vagina for 6 weeks.     FOLLOW-UP APPOINTMENTS: Follow up with [facility]  in 4 to 6 weeks for routine postpartum visit.     Angella Govea CNM  07/24/24  12:22 EDT

## 2024-07-24 NOTE — LACTATION NOTE
This note was copied from a baby's chart.  Mom reports she is breast and formula feeding baby. She will call for flange fit with next pump today because she reports discomfort.

## 2024-07-24 NOTE — TELEPHONE ENCOUNTER
Caller: Fe Smith    Relationship: Self    Best call back number: [phone]    What is the best time to reach you: ANY    Who are you requesting to speak with (clinical staff, provider,  specific staff member): NURSE        What was the call regarding: QUESTION ABOUT TUBAL LIGATION    OK FOR OFFICE TO LEAVE A VM ON PT PHONE

## 2024-07-24 NOTE — PROGRESS NOTES
VAGINAL DELIVERY POSTPARTUM DAY 2    2024  PATIENT: Fe Smith        MR#:[id number]  LOCATION: [facility]  DATE OF ADMISSION: 2024  ADMISSION  DIAGNOSIS:    (spontaneous vaginal delivery)    Normal labor    Request for sterilization    Postpartum anemia     CURRENT DIAGNOSIS: No notes have been filed under this hospital service.  Service: Hospitalist    SERVICE: Obstetrics       (spontaneous vaginal delivery)    Normal labor    Request for sterilization    Postpartum anemia        PROCEDURES:  Vaginal, Spontaneous     2024    2:34 PM      RH STATUS: A positive  SYPHILIS SCREEN DELIVERY ADMIT: treponemal antibody non-reactive upon admission  RUBELLA: immune  VARICELLA: unknown immunity  INFANT GENDER: female    SUBJECTIVE   Fe feels well.  Patient describes her lochia as less than menses.  Pain is well controlled.    OBJECTIVE   Temp: Temp:  [97.5 °F (36.4 °C)-97.9 °F (36.6 °C)] 97.9 °F (36.6 °C) Temp src: Oral   BP: BP: (126-130)/(61-83) 126/82        Pulse: Heart Rate:  [82-93] 82  RR: Resp:  [16] 16    General:  Awake, alert, no acute distress   Cardiac: Regular rate and rhythm    Respiratory: Lungs clear bilaterally, normal respiratory effort    Abdomen: Soft, non-distended, fundus firm, below umbilicus, appropriately tender   Pelvis: deferred   Extremities: Calves NT bilaterally, DTR 2+, no clonus noted, trace edema     Lab Results   Component Value Date    WBC 10.86 (H) 2024    HGB 9.6 (L) 2024    HCT 28.9 (L) 2024     2024    AST 17 2024    ALT 11 2024    CREATININE 0.52 (L) 2024       ASSESSMENT: Postpartum day 2 after vaginal delivery. Hgb: 9.6.    PLAN: Doing well. Discharge to home. Discharge instructions given, precautions reviewed. Follow up with [facility]  in 4 to 6 weeks for routine postpartum visit. Prescription for ibuprofen 600mg PO every 6 hours PRN for pain, docusate 100mg PO BID, and ferrous sulfate 325mg daily.  Advised no tampons, menstrual cups, intercourse, or tub baths for 6 weeks.     Angella Govea CNM  12:18 EDT  July 24, 2024

## 2024-07-26 NOTE — PROGRESS NOTES
"Continued Stay Note  [facility]     Patient Name: Fe Smith  MRN: [id number]  Today's Date: 7/26/2024    Admit Date: 7/22/2024    Plan: Infant may discharge to mother when medically ready; CSW will follow cord. ZAID Curran.   Discharge Plan       Row Name 07/26/24 1323       Plan    Plan Comments Mother: Fe Smith, MRN: [id number]; infant: CelestesGirl \"Merline\" Luis, MRN: [id number]. CSW has reviewed infant's cord toxicology results and infant's cord was negative for substances. Mandated CPS reporting is not required at this time. ZAID Curran.                   Discharge Codes    No documentation.                 Expected Discharge Date and Time       Expected Discharge Date Expected Discharge Time    Jul 24, 2024               BHARGAVI Fitzpatrick    "

## 2024-09-20 ENCOUNTER — POSTPARTUM VISIT (OUTPATIENT)
Dept: OBSTETRICS AND GYNECOLOGY | Facility: CLINIC | Age: 29
End: 2024-09-20
Payer: COMMERCIAL

## 2024-09-20 VITALS — DIASTOLIC BLOOD PRESSURE: 78 MMHG | SYSTOLIC BLOOD PRESSURE: 118 MMHG | WEIGHT: 185 LBS | BODY MASS INDEX: 37.37 KG/M2

## 2024-09-20 DIAGNOSIS — D50.9 IRON DEFICIENCY ANEMIA, UNSPECIFIED IRON DEFICIENCY ANEMIA TYPE: Primary | ICD-10-CM

## 2024-09-20 DIAGNOSIS — Z30.09 STERILIZATION CONSULT: ICD-10-CM

## 2024-09-20 LAB
HCT VFR BLD AUTO: 37.2 % (ref 34–46.6)
HGB BLD-MCNC: 12.7 G/DL (ref 12–15.9)

## 2024-09-20 RX ORDER — SODIUM CHLORIDE 0.9 % (FLUSH) 0.9 %
10 SYRINGE (ML) INJECTION AS NEEDED
OUTPATIENT
Start: 2024-09-20

## 2024-09-20 RX ORDER — SODIUM CHLORIDE 9 MG/ML
40 INJECTION, SOLUTION INTRAVENOUS AS NEEDED
OUTPATIENT
Start: 2024-09-20

## 2024-09-20 RX ORDER — SODIUM CHLORIDE 0.9 % (FLUSH) 0.9 %
10 SYRINGE (ML) INJECTION EVERY 12 HOURS SCHEDULED
OUTPATIENT
Start: 2024-09-20

## 2024-09-26 ENCOUNTER — TELEPHONE (OUTPATIENT)
Dept: OBSTETRICS AND GYNECOLOGY | Facility: CLINIC | Age: 29
End: 2024-09-26
Payer: COMMERCIAL

## 2024-10-01 ENCOUNTER — TELEPHONE (OUTPATIENT)
Dept: OBSTETRICS AND GYNECOLOGY | Facility: CLINIC | Age: 29
End: 2024-10-01
Payer: COMMERCIAL

## 2024-10-01 NOTE — TELEPHONE ENCOUNTER
Pt needs to reschedule surgery because of starting a new job.  Pt will need to sign new consents forms and once that is done I will reschedule her surgery.  Pt states that she will stop by one of the offices to sign tubal consent forms.

## 2025-06-02 ENCOUNTER — HOSPITAL ENCOUNTER (EMERGENCY)
Facility: HOSPITAL | Age: 30
Discharge: HOME | End: 2025-06-02
Attending: EMERGENCY MEDICINE | Admitting: EMERGENCY MEDICINE
Payer: COMMERCIAL

## 2025-06-02 ENCOUNTER — APPOINTMENT (OUTPATIENT)
Dept: CT IMAGING | Facility: HOSPITAL | Age: 30
End: 2025-06-02
Payer: COMMERCIAL

## 2025-06-02 ENCOUNTER — APPOINTMENT (OUTPATIENT)
Dept: ULTRASOUND IMAGING | Facility: HOSPITAL | Age: 30
End: 2025-06-02
Payer: COMMERCIAL

## 2025-06-02 VITALS
OXYGEN SATURATION: 99 % | SYSTOLIC BLOOD PRESSURE: 134 MMHG | TEMPERATURE: 97.9 F | RESPIRATION RATE: 18 BRPM | DIASTOLIC BLOOD PRESSURE: 85 MMHG | HEART RATE: 82 BPM

## 2025-06-02 DIAGNOSIS — R10.32 LEFT LOWER QUADRANT ABDOMINAL PAIN: Primary | ICD-10-CM

## 2025-06-02 DIAGNOSIS — R11.2 NAUSEA AND VOMITING, UNSPECIFIED VOMITING TYPE: ICD-10-CM

## 2025-06-02 LAB
ALBUMIN SERPL-MCNC: 4.4 G/DL (ref 3.5–5.2)
ALBUMIN/GLOB SERPL: 1.3 G/DL
ALP SERPL-CCNC: 61 U/L (ref 39–117)
ALT SERPL W P-5'-P-CCNC: 16 U/L (ref 1–33)
ANION GAP SERPL CALCULATED.3IONS-SCNC: 10.1 MMOL/L (ref 5–15)
AST SERPL-CCNC: 19 U/L (ref 1–32)
BASOPHILS # BLD AUTO: 0.03 10*3/MM3 (ref 0–0.2)
BASOPHILS NFR BLD AUTO: 0.3 % (ref 0–1.5)
BILIRUB SERPL-MCNC: 0.6 MG/DL (ref 0–1.2)
BILIRUB UR QL STRIP: NEGATIVE
BUN SERPL-MCNC: 13 MG/DL (ref 6–20)
BUN/CREAT SERPL: 20.6 (ref 7–25)
CALCIUM SPEC-SCNC: 9.3 MG/DL (ref 8.6–10.5)
CHLORIDE SERPL-SCNC: 107 MMOL/L (ref 98–107)
CLARITY UR: CLEAR
CO2 SERPL-SCNC: 23.9 MMOL/L (ref 22–29)
COLOR UR: YELLOW
CREAT SERPL-MCNC: 0.63 MG/DL (ref 0.57–1)
DEPRECATED RDW RBC AUTO: 37.9 FL (ref 37–54)
EGFRCR SERPLBLD CKD-EPI 2021: 123.3 ML/MIN/1.73
EOSINOPHIL # BLD AUTO: 0.05 10*3/MM3 (ref 0–0.4)
EOSINOPHIL NFR BLD AUTO: 0.5 % (ref 0.3–6.2)
ERYTHROCYTE [DISTWIDTH] IN BLOOD BY AUTOMATED COUNT: 11.7 % (ref 12.3–15.4)
GLOBULIN UR ELPH-MCNC: 3.3 GM/DL
GLUCOSE SERPL-MCNC: 107 MG/DL (ref 65–99)
GLUCOSE UR STRIP-MCNC: NEGATIVE MG/DL
HCG SERPL QL: NEGATIVE
HCT VFR BLD AUTO: 37.9 % (ref 34–46.6)
HGB BLD-MCNC: 12.9 G/DL (ref 12–15.9)
HGB UR QL STRIP.AUTO: NEGATIVE
HOLD SPECIMEN: NORMAL
IMM GRANULOCYTES # BLD AUTO: 0.03 10*3/MM3 (ref 0–0.05)
IMM GRANULOCYTES NFR BLD AUTO: 0.3 % (ref 0–0.5)
KETONES UR QL STRIP: NEGATIVE
LEUKOCYTE ESTERASE UR QL STRIP.AUTO: NEGATIVE
LIPASE SERPL-CCNC: 20 U/L (ref 13–60)
LYMPHOCYTES # BLD AUTO: 1.12 10*3/MM3 (ref 0.7–3.1)
LYMPHOCYTES NFR BLD AUTO: 11.7 % (ref 19.6–45.3)
MCH RBC QN AUTO: 30.6 PG (ref 26.6–33)
MCHC RBC AUTO-ENTMCNC: 34 G/DL (ref 31.5–35.7)
MCV RBC AUTO: 89.8 FL (ref 79–97)
MONOCYTES # BLD AUTO: 0.38 10*3/MM3 (ref 0.1–0.9)
MONOCYTES NFR BLD AUTO: 4 % (ref 5–12)
NEUTROPHILS NFR BLD AUTO: 7.96 10*3/MM3 (ref 1.7–7)
NEUTROPHILS NFR BLD AUTO: 83.2 % (ref 42.7–76)
NITRITE UR QL STRIP: NEGATIVE
NRBC BLD AUTO-RTO: 0 /100 WBC (ref 0–0.2)
PH UR STRIP.AUTO: 7 [PH] (ref 5–8)
PLATELET # BLD AUTO: 245 10*3/MM3 (ref 140–450)
PMV BLD AUTO: 10.5 FL (ref 6–12)
POTASSIUM SERPL-SCNC: 3.8 MMOL/L (ref 3.5–5.2)
PROT SERPL-MCNC: 7.7 G/DL (ref 6–8.5)
PROT UR QL STRIP: NEGATIVE
RBC # BLD AUTO: 4.22 10*6/MM3 (ref 3.77–5.28)
SODIUM SERPL-SCNC: 141 MMOL/L (ref 136–145)
SP GR UR STRIP: <=1.005 (ref 1–1.03)
UROBILINOGEN UR QL STRIP: NORMAL
WBC NRBC COR # BLD AUTO: 9.57 10*3/MM3 (ref 3.4–10.8)
WHOLE BLOOD HOLD COAG: NORMAL
WHOLE BLOOD HOLD SPECIMEN: NORMAL

## 2025-06-02 PROCEDURE — 25010000002 METOCLOPRAMIDE PER 10 MG: Performed by: EMERGENCY MEDICINE

## 2025-06-02 PROCEDURE — 76830 TRANSVAGINAL US NON-OB: CPT

## 2025-06-02 PROCEDURE — 76856 US EXAM PELVIC COMPLETE: CPT

## 2025-06-02 PROCEDURE — 80053 COMPREHEN METABOLIC PANEL: CPT | Performed by: EMERGENCY MEDICINE

## 2025-06-02 PROCEDURE — 25010000002 PROCHLORPERAZINE 10 MG/2ML SOLUTION: Performed by: EMERGENCY MEDICINE

## 2025-06-02 PROCEDURE — 25810000003 SODIUM CHLORIDE 0.9 % SOLUTION: Performed by: EMERGENCY MEDICINE

## 2025-06-02 PROCEDURE — 81003 URINALYSIS AUTO W/O SCOPE: CPT | Performed by: EMERGENCY MEDICINE

## 2025-06-02 PROCEDURE — 99285 EMERGENCY DEPT VISIT HI MDM: CPT

## 2025-06-02 PROCEDURE — 96375 TX/PRO/DX INJ NEW DRUG ADDON: CPT

## 2025-06-02 PROCEDURE — 96374 THER/PROPH/DIAG INJ IV PUSH: CPT

## 2025-06-02 PROCEDURE — 74177 CT ABD & PELVIS W/CONTRAST: CPT

## 2025-06-02 PROCEDURE — 25510000001 IOPAMIDOL 61 % SOLUTION: Performed by: EMERGENCY MEDICINE

## 2025-06-02 PROCEDURE — 25010000002 MORPHINE PER 10 MG: Performed by: EMERGENCY MEDICINE

## 2025-06-02 PROCEDURE — 93976 VASCULAR STUDY: CPT

## 2025-06-02 PROCEDURE — 25010000002 ONDANSETRON PER 1 MG: Performed by: EMERGENCY MEDICINE

## 2025-06-02 PROCEDURE — 84703 CHORIONIC GONADOTROPIN ASSAY: CPT | Performed by: EMERGENCY MEDICINE

## 2025-06-02 PROCEDURE — 83690 ASSAY OF LIPASE: CPT | Performed by: EMERGENCY MEDICINE

## 2025-06-02 PROCEDURE — 85025 COMPLETE CBC W/AUTO DIFF WBC: CPT | Performed by: EMERGENCY MEDICINE

## 2025-06-02 RX ORDER — METOCLOPRAMIDE HYDROCHLORIDE 5 MG/ML
10 INJECTION INTRAMUSCULAR; INTRAVENOUS ONCE
Status: COMPLETED | OUTPATIENT
Start: 2025-06-02 | End: 2025-06-02

## 2025-06-02 RX ORDER — MORPHINE SULFATE 2 MG/ML
4 INJECTION, SOLUTION INTRAMUSCULAR; INTRAVENOUS ONCE
Status: COMPLETED | OUTPATIENT
Start: 2025-06-02 | End: 2025-06-02

## 2025-06-02 RX ORDER — SODIUM CHLORIDE 0.9 % (FLUSH) 0.9 %
10 SYRINGE (ML) INJECTION AS NEEDED
Status: DISCONTINUED | OUTPATIENT
Start: 2025-06-02 | End: 2025-06-02 | Stop reason: HOSPADM

## 2025-06-02 RX ORDER — IOPAMIDOL 612 MG/ML
85 INJECTION, SOLUTION INTRAVASCULAR
Status: COMPLETED | OUTPATIENT
Start: 2025-06-02 | End: 2025-06-02

## 2025-06-02 RX ORDER — PROCHLORPERAZINE EDISYLATE 5 MG/ML
10 INJECTION INTRAMUSCULAR; INTRAVENOUS ONCE
Status: COMPLETED | OUTPATIENT
Start: 2025-06-02 | End: 2025-06-02

## 2025-06-02 RX ORDER — PROCHLORPERAZINE MALEATE 10 MG
10 TABLET ORAL EVERY 8 HOURS PRN
Qty: 10 TABLET | Refills: 0 | Status: SHIPPED | OUTPATIENT
Start: 2025-06-02

## 2025-06-02 RX ORDER — ONDANSETRON 2 MG/ML
4 INJECTION INTRAMUSCULAR; INTRAVENOUS ONCE
Status: COMPLETED | OUTPATIENT
Start: 2025-06-02 | End: 2025-06-02

## 2025-06-02 RX ADMIN — ONDANSETRON 4 MG: 2 INJECTION, SOLUTION INTRAMUSCULAR; INTRAVENOUS at 13:08

## 2025-06-02 RX ADMIN — PROCHLORPERAZINE EDISYLATE 10 MG: 5 INJECTION INTRAMUSCULAR; INTRAVENOUS at 15:51

## 2025-06-02 RX ADMIN — IOPAMIDOL 85 ML: 612 INJECTION, SOLUTION INTRAVENOUS at 13:48

## 2025-06-02 RX ADMIN — MORPHINE SULFATE 4 MG: 2 INJECTION, SOLUTION INTRAMUSCULAR; INTRAVENOUS at 13:09

## 2025-06-02 RX ADMIN — SODIUM CHLORIDE 1000 ML: 9 INJECTION, SOLUTION INTRAVENOUS at 13:06

## 2025-06-02 RX ADMIN — METOCLOPRAMIDE 10 MG: 5 INJECTION, SOLUTION INTRAMUSCULAR; INTRAVENOUS at 13:55

## 2025-06-02 NOTE — ED PROVIDER NOTES
EMERGENCY DEPARTMENT ENCOUNTER    Room Number:  37/37  PCP: Provider, No Known  Historian: Patient      HPI:  Chief Complaint: Abdominal pain  A complete HPI/ROS/PMH/PSH/SH/FH are unobtainable due to: None  Context: Fe Smith is a 30 y.o. female who presents to the ED c/o fairly sudden onset of severe left sided abdominal pain that began this morning and has been steadily worsening since.  She states that the pain is more so in the left lower portion of her abdomen and nonradiating.  She did develop some associated nausea and vomiting along with the pain.  She denies back pain, dysuria/hematuria, fever/chills, diarrhea/constipation, or known sick contacts.  She denies any previous episodes of similar symptoms.            PAST MEDICAL HISTORY  Active Ambulatory Problems     Diagnosis Date Noted    Request for sterilization 2024     (spontaneous vaginal delivery) 2024    Postpartum anemia 2024    Sterilization consult 2024     Resolved Ambulatory Problems     Diagnosis Date Noted    Pregnancy 2016    Rubella non-immune status, antepartum 10/05/2016    Back pain affecting pregnancy in third trimester 2016    Encounter for supervision of normal first pregnancy in third trimester 2016    Prenatal care, subsequent pregnancy in third trimester 2019    Other constipation 2019    Nausea and vomiting in pregnancy 2019    Migraine without aura and without status migrainosus, not intractable 2019    Iron deficiency anemia during pregnancy 2020    Pregnancy 2020    Kidney stone complicating pregnancy, third trimester 2020    SGA (small for gestational age) 2020    Gestational hypertension without significant proteinuria in third trimester 2020    Admission for laboratory examination 2020     (normal spontaneous vaginal delivery) 2020    Postpartum state 2020    Pregnancy 2024    Normal labor  07/22/2024     Past Medical History:   Diagnosis Date    Abnormal Pap smear of cervix     Anemia     Chlamydia     Gestational hypertension     HPV (human papilloma virus) infection N/a    Migraine     Multiple gestation 12-    PMS (premenstrual syndrome)          PAST SURGICAL HISTORY  Past Surgical History:   Procedure Laterality Date    WISDOM TOOTH EXTRACTION           FAMILY HISTORY  Family History   Problem Relation Age of Onset    Breast cancer Neg Hx     Ovarian cancer Neg Hx     Uterine cancer Neg Hx     Colon cancer Neg Hx          SOCIAL HISTORY  Social History     Socioeconomic History    Marital status: Single   Tobacco Use    Smoking status: Every Day     Current packs/day: 0.25     Average packs/day: 0.3 packs/day for 3.3 years (0.8 ttl pk-yrs)     Types: Cigars, Cigarettes     Start date: 2/1/2022   Vaping Use    Vaping status: Never Used   Substance and Sexual Activity    Alcohol use: No    Drug use: No    Sexual activity: Yes     Partners: Male     Birth control/protection: Pill, Partner of same sex         ALLERGIES  Patient has no known allergies.        REVIEW OF SYSTEMS  Review of Systems   Constitutional:  Negative for fever.   HENT:  Negative for sore throat.    Eyes: Negative.    Respiratory:  Negative for cough and shortness of breath.    Cardiovascular:  Negative for chest pain.   Gastrointestinal:  Positive for abdominal pain, nausea and vomiting. Negative for diarrhea.   Genitourinary:  Negative for dysuria.   Musculoskeletal:  Negative for neck pain.   Skin:  Negative for rash.   Allergic/Immunologic: Negative.    Neurological:  Negative for weakness, numbness and headaches.   Hematological: Negative.    Psychiatric/Behavioral: Negative.     All other systems reviewed and are negative.         PHYSICAL EXAM  ED Triage Vitals [06/02/25 1135]   Temp Heart Rate Resp BP SpO2   97.9 °F (36.6 °C) 72 18 114/50 100 %      Temp src Heart Rate Source Patient Position BP Location FiO2 (%)    Oral -- -- -- --       Physical Exam  Constitutional:       General: She is not in acute distress.     Appearance: Normal appearance. She is not ill-appearing or toxic-appearing.   HENT:      Head: Normocephalic and atraumatic.   Eyes:      Extraocular Movements: Extraocular movements intact.      Pupils: Pupils are equal, round, and reactive to light.   Cardiovascular:      Rate and Rhythm: Normal rate and regular rhythm.      Heart sounds: No murmur heard.     No friction rub. No gallop.   Pulmonary:      Effort: Pulmonary effort is normal.      Breath sounds: Normal breath sounds.   Abdominal:      General: Abdomen is flat. There is no distension.      Palpations: Abdomen is soft.      Tenderness: There is abdominal tenderness in the left upper quadrant and left lower quadrant.   Musculoskeletal:         General: No swelling or tenderness. Normal range of motion.      Cervical back: Normal range of motion and neck supple.   Skin:     General: Skin is warm and dry.   Neurological:      General: No focal deficit present.      Mental Status: She is alert and oriented to person, place, and time.      Sensory: No sensory deficit.      Motor: No weakness.   Psychiatric:         Mood and Affect: Mood normal.         Behavior: Behavior normal.           Vital signs and nursing notes reviewed.          LAB RESULTS  Recent Results (from the past 24 hours)   Comprehensive Metabolic Panel    Collection Time: 06/02/25 12:33 PM    Specimen: Blood   Result Value Ref Range    Glucose 107 (H) 65 - 99 mg/dL    BUN 13.0 6.0 - 20.0 mg/dL    Creatinine 0.63 0.57 - 1.00 mg/dL    Sodium 141 136 - 145 mmol/L    Potassium 3.8 3.5 - 5.2 mmol/L    Chloride 107 98 - 107 mmol/L    CO2 23.9 22.0 - 29.0 mmol/L    Calcium 9.3 8.6 - 10.5 mg/dL    Total Protein 7.7 6.0 - 8.5 g/dL    Albumin 4.4 3.5 - 5.2 g/dL    ALT (SGPT) 16 1 - 33 U/L    AST (SGOT) 19 1 - 32 U/L    Alkaline Phosphatase 61 39 - 117 U/L    Total Bilirubin 0.6 0.0 - 1.2 mg/dL     Globulin 3.3 gm/dL    A/G Ratio 1.3 g/dL    BUN/Creatinine Ratio 20.6 7.0 - 25.0    Anion Gap 10.1 5.0 - 15.0 mmol/L    eGFR 123.3 >60.0 mL/min/1.73   Lipase    Collection Time: 06/02/25 12:33 PM    Specimen: Blood   Result Value Ref Range    Lipase 20 13 - 60 U/L   hCG, Serum, Qualitative    Collection Time: 06/02/25 12:33 PM    Specimen: Blood   Result Value Ref Range    HCG Qualitative Negative Negative   Green Top (Gel)    Collection Time: 06/02/25 12:33 PM   Result Value Ref Range    Extra Tube Hold for add-ons.    Lavender Top    Collection Time: 06/02/25 12:33 PM   Result Value Ref Range    Extra Tube hold for add-on    Light Blue Top    Collection Time: 06/02/25 12:33 PM   Result Value Ref Range    Extra Tube Hold for add-ons.    CBC Auto Differential    Collection Time: 06/02/25 12:33 PM    Specimen: Blood   Result Value Ref Range    WBC 9.57 3.40 - 10.80 10*3/mm3    RBC 4.22 3.77 - 5.28 10*6/mm3    Hemoglobin 12.9 12.0 - 15.9 g/dL    Hematocrit 37.9 34.0 - 46.6 %    MCV 89.8 79.0 - 97.0 fL    MCH 30.6 26.6 - 33.0 pg    MCHC 34.0 31.5 - 35.7 g/dL    RDW 11.7 (L) 12.3 - 15.4 %    RDW-SD 37.9 37.0 - 54.0 fl    MPV 10.5 6.0 - 12.0 fL    Platelets 245 140 - 450 10*3/mm3    Neutrophil % 83.2 (H) 42.7 - 76.0 %    Lymphocyte % 11.7 (L) 19.6 - 45.3 %    Monocyte % 4.0 (L) 5.0 - 12.0 %    Eosinophil % 0.5 0.3 - 6.2 %    Basophil % 0.3 0.0 - 1.5 %    Immature Grans % 0.3 0.0 - 0.5 %    Neutrophils, Absolute 7.96 (H) 1.70 - 7.00 10*3/mm3    Lymphocytes, Absolute 1.12 0.70 - 3.10 10*3/mm3    Monocytes, Absolute 0.38 0.10 - 0.90 10*3/mm3    Eosinophils, Absolute 0.05 0.00 - 0.40 10*3/mm3    Basophils, Absolute 0.03 0.00 - 0.20 10*3/mm3    Immature Grans, Absolute 0.03 0.00 - 0.05 10*3/mm3    nRBC 0.0 0.0 - 0.2 /100 WBC   Urinalysis With Microscopic If Indicated (No Culture) - Urine, Clean Catch    Collection Time: 06/02/25 12:34 PM    Specimen: Urine, Clean Catch   Result Value Ref Range    Color, UA Yellow Yellow,  Straw    Appearance, UA Clear Clear    pH, UA 7.0 5.0 - 8.0    Specific Gravity, UA <=1.005 1.005 - 1.030    Glucose, UA Negative Negative    Ketones, UA Negative Negative    Bilirubin, UA Negative Negative    Blood, UA Negative Negative    Protein, UA Negative Negative    Leuk Esterase, UA Negative Negative    Nitrite, UA Negative Negative    Urobilinogen, UA 0.2 E.U./dL 0.2 - 1.0 E.U./dL       Ordered the above labs and reviewed the results.        RADIOLOGY  US Non-ob Transvaginal  US Non-ob Transvaginal, US Pelvis Complete  US Non-ob Transvaginal, US Pelvis Complete, US Testicular or Ovarian Vascular Limited  Result Date: 6/2/2025  PELVIC ULTRASOUND WITH SPECTRAL DOPPLER  TECHNIQUE: Grayscale, color and spectral Doppler evaluation of the pelvis was performed with transabdominal and transvaginal probes.  HISTORY: Left-sided abdominal pain. Negative beta-hCG.  COMPARISON: CT abdomen pelvis 6/2/2025  FINDINGS: Anteverted uterus.  The uterus measures 8 cm in length. Tiny anechoic focus of likely fluid within the endocervical canal.  The endometrial stripe measures 0.6 cm.  The right ovary measures 4.1 x 2.9 x 3.4 cm. The left ovary measures 2.9 x 1.5 x 3 cm.  Right intraovarian 2.3 x 2.8 x 2.8 cm cyst with subtle internal fine thin intersecting lines and dependent layering fluid-fluid level. No internal color Doppler flow.  There are normal arterial and venous waveforms within both ovaries.        Right intraovarian likely hemorrhagic cyst. Otherwise unremarkable pelvic ultrasound.   This report was finalized on 6/2/2025 3:58 PM by Dr. Hans Dorado M.D on Workstation: [id number]      CT Abdomen Pelvis With Contrast  Result Date: 6/2/2025  CT ABDOMEN PELVIS W CONTRAST-  INDICATION: Left-sided abdominal pain. Nausea and vomiting.  COMPARISON: CT abdomen pelvis March 21, 2023  TECHNIQUE: Routine CT abdomen/pelvis with IV contrast. Coronal and sagittal reformats. Radiation dose reduction techniques were utilized,  including automated exposure control and exposure modulation based on body size.  FINDINGS:  Lung bases: Unremarkable.  ABDOMEN: Normal liver. Suspected cholelithiasis. No gallbladder wall thickening or surrounding inflammation. No biliary ductal dilatation. Spleen is normal in size. Incidental splenule. No pancreatic mass or pancreatic ductal dilatation seen. No adrenal nodules. No renal mass or hydronephrosis.  Pelvis: Bladder is collapsed. No bladder calculus. Anteverted uterus. Dominant follicle seen in the right ovary, measures 2.7 cm. Trace fluid in the cul-de-sac, within physiologic limits.  Bowel: Stomach is under distended. No small bowel obstruction. Normal appendix.  Abdominal wall: Periumbilical abdominal wall scarring.  Retroperitoneum: No lymphadenopathy.  Vasculature: Patent. No abdominal aortic aneurysm.  Osseous structures: No destructive osseous lesions.       1. No acute findings identified in the abdomen or pelvis. 2. Suspected cholelithiasis  This report was finalized on 6/2/2025 2:14 PM by Dr. Eduard Recinos M.D on Workstation: [id number]        Ordered the above noted radiological studies. Reviewed by me in PACS.            PROCEDURES  Procedures              MEDICATIONS GIVEN IN ER  Medications   sodium chloride 0.9 % bolus 1,000 mL (0 mL Intravenous Stopped 6/2/25 1553)   ondansetron (ZOFRAN) injection 4 mg (4 mg Intravenous Given 6/2/25 1308)   morphine injection 4 mg (4 mg Intravenous Given 6/2/25 1309)   metoclopramide (REGLAN) injection 10 mg (10 mg Intravenous Given 6/2/25 1355)   iopamidol (ISOVUE-300) 61 % injection 85 mL (85 mL Intravenous Given 6/2/25 1348)   prochlorperazine (COMPAZINE) injection 10 mg (10 mg Intravenous Given 6/2/25 1551)                   MEDICAL DECISION MAKING, PROGRESS, and CONSULTS    All labs have been independently reviewed by me.  All radiology studies have been reviewed by me and I have also reviewed the radiology report.   EKGs independently viewed  and interpreted by me.  Discussion below represents my analysis of pertinent findings related to patient's condition, differential diagnosis, treatment plan and final disposition.      Additional sources:  - Discussed/ obtained information from independent historians: History obtained from the patient herself at bedside.    - External (non-ED) record review: Upon medical records review, the patient was last seen and evaluated in the outpatient office at [facility] on 11/21/2020 for for evaluation of an intractable chronic migraine.    - Chronic or social conditions impacting care: None reported    - Shared decision making: Discharge decision based on shared conversations have between myself as well as the patient at bedside.      Orders placed during this visit:  Orders Placed This Encounter   Procedures    CT Abdomen Pelvis With Contrast    US Non-ob Transvaginal    US Pelvis Complete    US Testicular or Ovarian Vascular Limited    [place] Draw    Comprehensive Metabolic Panel    Lipase    Urinalysis With Microscopic If Indicated (No Culture) - Urine, Clean Catch    hCG, Serum, Qualitative    CBC Auto Differential    Undress & Gown    CBC & Differential    Green Top (Gel)    Lavender Top    Light Blue Top           Differential diagnosis includes but is not limited to:    Ovarian torsion, ruptured ovarian cyst, tubo-ovarian abscess, ectopic pregnancy, abdominal pain or pregnancy, ureterolithiasis, acute pyelonephritis, musculoskeletal pain, acute diverticulitis, or acute colitis      Independent interpretation of labs, radiology studies, and discussions with consultants:    Lab values independently interpreted by myself with my interpretation showing normal CBC, CMP, as well as UA.      ED Course as of 06/02/25 1957 Mon Jun 02, 2025   1545 On reevaluation, the patient was still having some reported discomfort as well as nausea and vomiting.  I informed her that we were waiting on the final read for her  ultrasound however I recommend that we keep her today potentially in the observation unit due to the ongoing vomiting.  She however declines and does state that she feels better and cannot be admitted to the hospital today due to childcare issues.  I did inform her that we would retreat her nausea and if the ultrasounds unremarkable, she will be stable for discharge with appropriate return instructions.  She agrees with that plan and all questions answered. [BM]      ED Course User Index  [BM] Min Collado MD           DIAGNOSIS  Final diagnoses:   Left lower quadrant abdominal pain   Nausea and vomiting, unspecified vomiting type         DISPOSITION  DISCHARGE    Patient discharged in stable condition.    Reviewed implications of results, diagnosis, meds, responsibility to follow up, warning signs and symptoms of possible worsening, potential complications and reasons to return to ER.    Patient/Family voiced understanding of above instructions.    Discussed plan for discharge, as there is no emergent indication for admission. Patient referred to primary care provider for BP management due to today's BP. Pt/family is agreeable and understands need for follow up and repeat testing.  Pt is aware that discharge does not mean that nothing is wrong but it indicates no emergency is present that requires admission and they must continue care with follow-up as given below or physician of their choice.     FOLLOW-UP  [facility]  [address] [facility] [address] [phone]  Schedule an appointment as soon as possible for a visit            Medication List        New Prescriptions      prochlorperazine 10 MG tablet  Commonly known as: COMPAZINE  Take 1 tablet by mouth Every 8 (Eight) Hours As Needed for Nausea or Vomiting.               Where to Get Your Medications        These medications were sent to [facility]  [address],  [facility] [address]      Hours: Monday to Friday 7 AM to 6 PM, Saturday & Sunday 8 AM to 4:30 PM (Closed 12 PM to 12:30 PM) Phone: [phone]   prochlorperazine 10 MG tablet                   Latest Documented Vital Signs:  As of 19:57 EDT  BP- 134/85 HR- 82 Temp- 97.9 °F (36.6 °C) (Oral) O2 sat- 99%              --    Please note that portions of this were completed with a voice recognition program.       Note Disclaimer: At [facility], we believe that sharing information builds trust and better relationships. You are receiving this note because you are receiving care at [facility] or recently visited. It is possible you will see health information before a provider has talked with you about it. This kind of information can be easy to misunderstand. To help you fully understand what it means for your health, we urge you to discuss this note with your provider.             Min Collado MD  06/02/25 1958

## 2025-06-02 NOTE — ED NOTES
Pt arrived to ER via EMS from home, c/o of left lower abdominal pain x 2 hours, n/v started 2 hours ago as well. Per pt, possibility of pregnancy, 10 months post partum

## 2025-07-23 ENCOUNTER — OFFICE VISIT (OUTPATIENT)
Dept: GASTROENTEROLOGY | Facility: CLINIC | Age: 30
End: 2025-07-23
Payer: COMMERCIAL

## 2025-07-23 ENCOUNTER — PREP FOR SURGERY (OUTPATIENT)
Dept: SURGERY | Facility: SURGERY CENTER | Age: 30
End: 2025-07-23
Payer: COMMERCIAL

## 2025-07-23 VITALS
WEIGHT: 157 LBS | SYSTOLIC BLOOD PRESSURE: 126 MMHG | HEIGHT: 59 IN | BODY MASS INDEX: 31.65 KG/M2 | DIASTOLIC BLOOD PRESSURE: 82 MMHG

## 2025-07-23 DIAGNOSIS — R10.13 EPIGASTRIC PAIN: Primary | ICD-10-CM

## 2025-07-23 DIAGNOSIS — R11.0 NAUSEA: ICD-10-CM

## 2025-07-23 DIAGNOSIS — R11.2 NAUSEA AND VOMITING, UNSPECIFIED VOMITING TYPE: ICD-10-CM

## 2025-07-23 RX ORDER — SODIUM CHLORIDE, SODIUM LACTATE, POTASSIUM CHLORIDE, CALCIUM CHLORIDE 600; 310; 30; 20 MG/100ML; MG/100ML; MG/100ML; MG/100ML
30 INJECTION, SOLUTION INTRAVENOUS CONTINUOUS PRN
OUTPATIENT
Start: 2025-07-23 | End: 2025-07-24

## 2025-07-23 RX ORDER — SODIUM CHLORIDE 0.9 % (FLUSH) 0.9 %
10 SYRINGE (ML) INJECTION AS NEEDED
OUTPATIENT
Start: 2025-07-23

## 2025-07-23 RX ORDER — SUCRALFATE 1 G/1
1 TABLET ORAL
Qty: 120 TABLET | Refills: 1 | Status: SHIPPED | OUTPATIENT
Start: 2025-07-23

## 2025-07-23 RX ORDER — SODIUM CHLORIDE 0.9 % (FLUSH) 0.9 %
3 SYRINGE (ML) INJECTION EVERY 12 HOURS SCHEDULED
OUTPATIENT
Start: 2025-07-23

## 2025-07-23 NOTE — PROGRESS NOTES
Patient or patient representative verbalized consent for the use of Ambient Listening during the visit with  LISA Conte for chart documentation. 7/23/2025  15:32 EDT    Chief Complaint   Patient presents with    Nausea    Vomiting    Abdominal Pain         Patient is a 30 y.o. who presents to the office as a new patient for further evaluation of gallstones after referral received from LISA Gutierrez. patient has a significant past medical history of anemia, migraines, and HPV.  No prior EGD or colonoscopy    Past Surgical History:  Past Surgical History:   Procedure Laterality Date    WISDOM TOOTH EXTRACTION         Social History:  Social History     Tobacco Use    Smoking status: Former     Types: Cigars     Passive exposure: Past    Smokeless tobacco: Never   Substance Use Topics    Alcohol use: No     Social History     Substance and Sexual Activity   Drug Use No       Family history:  Family History   Problem Relation Age of Onset    Breast cancer Neg Hx     Ovarian cancer Neg Hx     Uterine cancer Neg Hx     Colon cancer Neg Hx        History of Present Illness  The patient presents with nausea and emesis.    Nausea and Emesis  - Recently, the patient visited the emergency department due to profuse emesis and diarrhea, where they were diagnosed with cholelithiasis but left prematurely due to discomfort, including migraine-like cephalalgia and anxiety.  - Over the past few weeks, the patient has experienced episodes of nocturnal emesis, occurring at least three times in the last two weeks.  - The emesis content varies with dietary intake though denies known specific dietary trigger to onset.  Denies hematemesis  - The patient reports persistent right upper quadrant pain unrelated to oral intake or bowel movements..  - Bowel movements have increased to two or three times daily, with occasional simultaneous emesis and diarrhea upon waking.  - Denies unintentional weight loss    Medication  - The  "patient is not currently taking medication for nausea.  - They have Phenergan at home but avoid its use during chronic migraines.  - Zofran has been ineffective.  - The patient uses ibuprofen and Excedrin for analgesia.    There is no report of hematochezia or melena.    Medications    Current Outpatient Medications:     sucralfate (CARAFATE) 1 g tablet, Take 1 tablet by mouth 4 (Four) Times a Day After Meals & at Bedtime. Start sucralfate 1 tablet up to 4 times a day at meals and bedtime,  can crush the pill in 1/2 inch of water (if difficulty swallowing whole) and do not eat or drink for 30 minutes to 1 hour afterward., Disp: 120 tablet, Rfl: 1  Result Review :      Common labs          9/20/2024    11:23 6/2/2025    12:33   Common Labs   Glucose  107    BUN  13.0    Creatinine  0.63    Sodium  141    Potassium  3.8    Chloride  107    Calcium  9.3    Albumin  4.4    Total Bilirubin  0.6    Alkaline Phosphatase  61    AST (SGOT)  19    ALT (SGPT)  16    WBC  9.57    Hemoglobin 12.7  12.9    Hematocrit 37.2  37.9    Platelets  245    PROGRESS NOTES - SCAN - PROG NOTE-GINGER KENNEYMOIZ APRN-6/4/25 (06/04/2025)   CT Abdomen Pelvis With Contrast (06/02/2025 13:48)   Suspected cholelithiasis without evidence of acute cholecystitis  Otherwise unremarkable  Vital Signs:   /82 (BP Location: Right arm, Patient Position: Sitting, Cuff Size: Adult)   Ht 149.9 cm (59.02\")   Wt 71.2 kg (157 lb)   BMI 31.69 kg/m²     Body mass index is 31.69 kg/m².      Physical Exam  Constitutional:       General: She is not in acute distress.     Appearance: Normal appearance. She is not ill-appearing.   HENT:      Head: Normocephalic.   Eyes:      General: No scleral icterus.  Pulmonary:      Effort: No respiratory distress.   Abdominal:      General: Abdomen is flat. Bowel sounds are normal. There is no distension.      Palpations: Abdomen is soft. There is no mass.      Tenderness: There is no abdominal tenderness. There is no guarding " or rebound.      Hernia: No hernia is present.   Skin:     General: Skin is warm and dry.   Neurological:      Mental Status: She is alert.      Gait: Gait normal.   Psychiatric:         Mood and Affect: Mood normal.       Assessment and Plan    Diagnoses and all orders for this visit:    1. Epigastric pain (Primary)  -     sucralfate (CARAFATE) 1 g tablet; Take 1 tablet by mouth 4 (Four) Times a Day After Meals & at Bedtime. Start sucralfate 1 tablet up to 4 times a day at meals and bedtime,  can crush the pill in 1/2 inch of water (if difficulty swallowing whole) and do not eat or drink for 30 minutes to 1 hour afterward.  Dispense: 120 tablet; Refill: 1  -     US Abdomen Limited; Future    2. Nausea  -     sucralfate (CARAFATE) 1 g tablet; Take 1 tablet by mouth 4 (Four) Times a Day After Meals & at Bedtime. Start sucralfate 1 tablet up to 4 times a day at meals and bedtime,  can crush the pill in 1/2 inch of water (if difficulty swallowing whole) and do not eat or drink for 30 minutes to 1 hour afterward.  Dispense: 120 tablet; Refill: 1  -     US Abdomen Limited; Future       Assessment & Plan  Nausea and vomiting with right upper quadrant abdominal pain:  - Order liver and gallbladder ultrasound for wall thickening or irritation  - Perform endoscopy to examine esophagus, stomach, and duodenum for inflammation or ulcers  - Treat symptomatically with anti-nausea medications  - Recommend hermes TID before meals  - Prescribe sucralfate up to QID PRN  - Reduce Excedrin and ibuprofen to decrease ulcer and stomach irritation risk  Follow up:  4-6 weeks following endoscopic evaluation    Patient is agreeable to the outlined above treatment plan.  Verbalizes understanding and will contact office for any new or worsening concerns.  All questions answered and support provided.  Patient Instructions   Schedule EGD for further evaluation, orders placed.    Additional recommendations will be made based on EGD findings.      Ways to help reduce heartburn symptoms:    Start sucralfate 1 tablet up to four daily as needed for upper abdominal pain and nausea, can crush the pill in 1 inch of water and do not eat or drink for 30 minutes to 1 hour afterward.  Do not take regularly scheduled medications along with this medication as it can reduce absorption of other medications   Avoid eating late night snacks within 3 hours of going to bed  If you have increased abdominal body weight, lifestyle changes to improve weight can help with heartburn symtoms  If you use tobacco products, we recommend that you stop smoking including e-cigarettes  Research has proven that people with heartburn who use tobacco can reduce heartburn symptoms by 44% after they stop smoking.   Sleep on your left side  Sleeping with your head/upper body elevated with a wedge pillow or with the head of the bed inclined   Avoid foods that can trigger symptoms which may include:  citrus fruits  spicy foods,  Tomatoes and Red sauces   Chocolate  coffee/tea  caffeinated or carbonated beverages  Alcohol  High fat foods  peppermint    Scheduling of your Ordered Diagnostic Tests right upper abdominal us :    A team member from [facility] scheduling department will contact you to schedule your tests.    You can also contact them directly at [phone]      EMR Dragon/Transcription Disclaimer:  This document has been Dictated utilizing Dragon dictation.     Kimberli Stephen, MSN, APRN, FNP-C   [facility]  Gastroenterology   [address] [id number] [phone] office  [phone] fax

## 2025-07-23 NOTE — PATIENT INSTRUCTIONS
Schedule EGD for further evaluation, orders placed.    Additional recommendations will be made based on EGD findings.     Ways to help reduce heartburn symptoms:    Start sucralfate 1 tablet up to four daily as needed for upper abdominal pain and nausea, can crush the pill in 1 inch of water and do not eat or drink for 30 minutes to 1 hour afterward.  Do not take regularly scheduled medications along with this medication as it can reduce absorption of other medications   Avoid eating late night snacks within 3 hours of going to bed  If you have increased abdominal body weight, lifestyle changes to improve weight can help with heartburn symtoms  If you use tobacco products, we recommend that you stop smoking including e-cigarettes  Research has proven that people with heartburn who use tobacco can reduce heartburn symptoms by 44% after they stop smoking.   Sleep on your left side  Sleeping with your head/upper body elevated with a wedge pillow or with the head of the bed inclined   Avoid foods that can trigger symptoms which may include:  citrus fruits  spicy foods,  Tomatoes and Red sauces   Chocolate  coffee/tea  caffeinated or carbonated beverages  Alcohol  High fat foods  peppermint    Scheduling of your Ordered Diagnostic Tests right upper abdominal us :    A team member from [facility] scheduling department will contact you to schedule your tests.    You can also contact them directly at [phone]

## 2025-08-06 ENCOUNTER — HOSPITAL ENCOUNTER (OUTPATIENT)
Dept: ULTRASOUND IMAGING | Facility: HOSPITAL | Age: 30
Discharge: HOME | End: 2025-08-06
Payer: COMMERCIAL

## 2025-08-06 DIAGNOSIS — R11.0 NAUSEA: ICD-10-CM

## 2025-08-06 DIAGNOSIS — R10.13 EPIGASTRIC PAIN: ICD-10-CM

## 2025-08-06 PROCEDURE — 76705 ECHO EXAM OF ABDOMEN: CPT

## 2025-08-13 ENCOUNTER — ANESTHESIA EVENT (OUTPATIENT)
Dept: SURGERY | Facility: SURGERY CENTER | Age: 30
End: 2025-08-13
Payer: COMMERCIAL

## 2025-08-13 ENCOUNTER — HOSPITAL ENCOUNTER (OUTPATIENT)
Facility: SURGERY CENTER | Age: 30
Setting detail: HOSPITAL OUTPATIENT SURGERY
Discharge: HOME | End: 2025-08-13
Attending: STUDENT IN AN ORGANIZED HEALTH CARE EDUCATION/TRAINING PROGRAM | Admitting: STUDENT IN AN ORGANIZED HEALTH CARE EDUCATION/TRAINING PROGRAM
Payer: COMMERCIAL

## 2025-08-13 ENCOUNTER — ANESTHESIA (OUTPATIENT)
Dept: SURGERY | Facility: SURGERY CENTER | Age: 30
End: 2025-08-13
Payer: COMMERCIAL

## 2025-08-13 VITALS
OXYGEN SATURATION: 100 % | HEIGHT: 59 IN | SYSTOLIC BLOOD PRESSURE: 124 MMHG | DIASTOLIC BLOOD PRESSURE: 80 MMHG | HEART RATE: 70 BPM | BODY MASS INDEX: 31.04 KG/M2 | TEMPERATURE: 98 F | WEIGHT: 154 LBS | RESPIRATION RATE: 16 BRPM

## 2025-08-13 DIAGNOSIS — R10.13 EPIGASTRIC PAIN: ICD-10-CM

## 2025-08-13 DIAGNOSIS — R11.2 NAUSEA AND VOMITING, UNSPECIFIED VOMITING TYPE: ICD-10-CM

## 2025-08-13 PROCEDURE — 43239 EGD BIOPSY SINGLE/MULTIPLE: CPT | Performed by: STUDENT IN AN ORGANIZED HEALTH CARE EDUCATION/TRAINING PROGRAM

## 2025-08-13 PROCEDURE — 25810000003 LACTATED RINGERS PER 1000 ML

## 2025-08-13 PROCEDURE — 25010000002 GLYCOPYRROLATE 1 MG/5ML SOLUTION: Performed by: NURSE ANESTHETIST, CERTIFIED REGISTERED

## 2025-08-13 PROCEDURE — 25010000002 LIDOCAINE PF 2% 2 % SOLUTION: Performed by: NURSE ANESTHETIST, CERTIFIED REGISTERED

## 2025-08-13 PROCEDURE — 25010000002 PROPOFOL 10 MG/ML EMULSION: Performed by: NURSE ANESTHETIST, CERTIFIED REGISTERED

## 2025-08-13 PROCEDURE — 88305 TISSUE EXAM BY PATHOLOGIST: CPT | Performed by: STUDENT IN AN ORGANIZED HEALTH CARE EDUCATION/TRAINING PROGRAM

## 2025-08-13 PROCEDURE — 25010000002 PROPOFOL 1000 MG/100ML EMULSION: Performed by: NURSE ANESTHETIST, CERTIFIED REGISTERED

## 2025-08-13 PROCEDURE — 81025 URINE PREGNANCY TEST: CPT | Performed by: STUDENT IN AN ORGANIZED HEALTH CARE EDUCATION/TRAINING PROGRAM

## 2025-08-13 RX ORDER — LIDOCAINE HYDROCHLORIDE 20 MG/ML
INJECTION, SOLUTION EPIDURAL; INFILTRATION; INTRACAUDAL; PERINEURAL AS NEEDED
Status: DISCONTINUED | OUTPATIENT
Start: 2025-08-13 | End: 2025-08-13 | Stop reason: SURG

## 2025-08-13 RX ORDER — ACETAMINOPHEN 500 MG
500 TABLET ORAL EVERY 6 HOURS PRN
COMMUNITY

## 2025-08-13 RX ORDER — GLYCOPYRROLATE 0.2 MG/ML
INJECTION INTRAMUSCULAR; INTRAVENOUS AS NEEDED
Status: DISCONTINUED | OUTPATIENT
Start: 2025-08-13 | End: 2025-08-13 | Stop reason: SURG

## 2025-08-13 RX ORDER — IBUPROFEN 200 MG
200 TABLET ORAL EVERY 6 HOURS PRN
COMMUNITY

## 2025-08-13 RX ORDER — SODIUM CHLORIDE, SODIUM LACTATE, POTASSIUM CHLORIDE, CALCIUM CHLORIDE 600; 310; 30; 20 MG/100ML; MG/100ML; MG/100ML; MG/100ML
30 INJECTION, SOLUTION INTRAVENOUS CONTINUOUS PRN
Status: DISCONTINUED | OUTPATIENT
Start: 2025-08-13 | End: 2025-08-13 | Stop reason: HOSPADM

## 2025-08-13 RX ORDER — SODIUM CHLORIDE 0.9 % (FLUSH) 0.9 %
10 SYRINGE (ML) INJECTION AS NEEDED
Status: DISCONTINUED | OUTPATIENT
Start: 2025-08-13 | End: 2025-08-13 | Stop reason: HOSPADM

## 2025-08-13 RX ORDER — PROPOFOL 10 MG/ML
INJECTION, EMULSION INTRAVENOUS AS NEEDED
Status: DISCONTINUED | OUTPATIENT
Start: 2025-08-13 | End: 2025-08-13 | Stop reason: SURG

## 2025-08-13 RX ORDER — SODIUM CHLORIDE 0.9 % (FLUSH) 0.9 %
3 SYRINGE (ML) INJECTION EVERY 12 HOURS SCHEDULED
Status: DISCONTINUED | OUTPATIENT
Start: 2025-08-13 | End: 2025-08-13 | Stop reason: HOSPADM

## 2025-08-13 RX ADMIN — LIDOCAINE HYDROCHLORIDE 100 MG: 20 INJECTION, SOLUTION EPIDURAL; INFILTRATION; INTRACAUDAL; PERINEURAL at 10:35

## 2025-08-13 RX ADMIN — GLYCOPYRROLATE 0.2 MCG: 0.2 INJECTION, SOLUTION INTRAMUSCULAR; INTRAVENOUS at 10:31

## 2025-08-13 RX ADMIN — PROPOFOL 120 MG: 10 INJECTION, EMULSION INTRAVENOUS at 10:35

## 2025-08-13 RX ADMIN — SODIUM CHLORIDE, POTASSIUM CHLORIDE, SODIUM LACTATE AND CALCIUM CHLORIDE 30 ML/HR: 600; 310; 30; 20 INJECTION, SOLUTION INTRAVENOUS at 10:22

## 2025-08-13 RX ADMIN — PROPOFOL 300 MCG/KG/MIN: 10 INJECTION, EMULSION INTRAVENOUS at 10:35

## 2025-08-14 LAB
CYTO UR: NORMAL
LAB AP CASE REPORT: NORMAL
PATH REPORT.FINAL DX SPEC: NORMAL
PATH REPORT.GROSS SPEC: NORMAL

## 2025-08-19 ENCOUNTER — PREP FOR SURGERY (OUTPATIENT)
Dept: OTHER | Facility: HOSPITAL | Age: 30
End: 2025-08-19
Payer: COMMERCIAL

## 2025-08-19 ENCOUNTER — OFFICE VISIT (OUTPATIENT)
Dept: OBSTETRICS AND GYNECOLOGY | Facility: CLINIC | Age: 30
End: 2025-08-19
Payer: COMMERCIAL

## 2025-08-19 VITALS — BODY MASS INDEX: 31.75 KG/M2 | WEIGHT: 157.2 LBS

## 2025-08-19 DIAGNOSIS — Z30.09 STERILIZATION CONSULT: Primary | ICD-10-CM

## 2025-08-19 RX ORDER — SODIUM CHLORIDE 0.9 % (FLUSH) 0.9 %
10 SYRINGE (ML) INJECTION AS NEEDED
OUTPATIENT
Start: 2025-08-19

## 2025-08-19 RX ORDER — SODIUM CHLORIDE 0.9 % (FLUSH) 0.9 %
10 SYRINGE (ML) INJECTION EVERY 12 HOURS SCHEDULED
OUTPATIENT
Start: 2025-08-19

## 2025-08-19 RX ORDER — SODIUM CHLORIDE 9 MG/ML
40 INJECTION, SOLUTION INTRAVENOUS AS NEEDED
OUTPATIENT
Start: 2025-08-19

## (undated) DEVICE — BLCK/BITE BLOX W/DENTL/RIM W/STRAP 54F

## (undated) DEVICE — MSK ENDO PORT O2 POM ELITE CURAPLEX A/

## (undated) DEVICE — VIAL FORMLN CAP 10PCT 20ML

## (undated) DEVICE — KT ORCA ORCAPOD DISP STRL

## (undated) DEVICE — GOWN SURG ENDOARMOR LVL3 UNIV KNT/CUF DISP NS

## (undated) DEVICE — SINGLE-USE BIOPSY FORCEPS: Brand: RADIAL JAW 4

## (undated) DEVICE — ADAPT CLN SCPE ENDO PORPOISE BX/50 DISP

## (undated) DEVICE — Device